# Patient Record
Sex: MALE | Race: WHITE | NOT HISPANIC OR LATINO | URBAN - METROPOLITAN AREA
[De-identification: names, ages, dates, MRNs, and addresses within clinical notes are randomized per-mention and may not be internally consistent; named-entity substitution may affect disease eponyms.]

---

## 2021-06-25 ENCOUNTER — COMMUNICATION - HEALTHEAST (OUTPATIENT)
Dept: ONCOLOGY | Facility: HOSPITAL | Age: 61
End: 2021-06-25

## 2021-06-28 ENCOUNTER — COMMUNICATION - HEALTHEAST (OUTPATIENT)
Dept: ONCOLOGY | Facility: CLINIC | Age: 61
End: 2021-06-28

## 2021-07-06 ENCOUNTER — TRANSCRIBE ORDERS (OUTPATIENT)
Dept: OTHER | Age: 61
End: 2021-07-06

## 2021-07-06 DIAGNOSIS — C25.0 MALIGNANT TUMOR OF HEAD OF PANCREAS (H): Primary | ICD-10-CM

## 2021-07-06 DIAGNOSIS — C25.4 MALIGNANT NEOPLASM OF ENDOCRINE PANCREAS (H): ICD-10-CM

## 2021-07-07 NOTE — TELEPHONE ENCOUNTER
Navigator called patient from IB message that he self referred for radiation oncology:  Notes: Patient called in wanting to make an appointment for his newly diagnosed tumor. Patient said he was just diagnosed today at [facility]. He was wanting to see a radiation oncologist to discuss options.     Left message with call back number, and note that if we do not talk today, I will hand him off to one of my navigator colleagues as I will not be in on Monday.     Unclear why pt wants treatment at [facility] when he lives in [place] and was diagnosed at [facility] in [place]. Will need medical records.     Will try again this afternoon.

## 2021-07-07 NOTE — TELEPHONE ENCOUNTER
Navigator called patient again regarding self referral. No answer. Left message that a nurse navigator will reach out on Monday.

## 2021-07-07 NOTE — TELEPHONE ENCOUNTER
Patient called the clinic last Friday, June 25th, requesting to schedule an appt with a radiation oncologist. VIANNEY Kelly, attempted to reach patient x 2 and messages were left. See prior nrsg notes.  I called Kev this morning and I was unable to reach him. I left a message stating my name, organization and purpose of my call. I encouraged a call back at his earliest convenience. I provided my direct number.

## 2021-07-08 NOTE — TELEPHONE ENCOUNTER
RECORDS STATUS - ALL OTHER DIAGNOSIS      RECORDS RECEIVED FROM:    DATE RECEIVED:    NOTES STATUS DETAILS   OFFICE NOTE from referring provider Self, Notes in [facility] General Surgery/Dr. Agustin: 7/2/21     OFFICE NOTE from medical oncologist [facility] Dr. Luke Hein: 6/25/21   DISCHARGE SUMMARY from hospital NA    DISCHARGE REPORT from the ER NA    OPERATIVE REPORT [facility] 6/22/21: EUS   MEDICATION LIST [facility]   CLINICAL TRIAL TREATMENTS TO DATE     LABS     PATHOLOGY REPORTS [facility], Report in CE, Slides received 7/13 6/22/21: [id number]  6/22/21: [id number]   ANYTHING RELATED TO DIAGNOSIS Epic/CE 5/13/21   GENONOMIC TESTING     TYPE:     IMAGING (NEED IMAGES & REPORT)     CT SCANS PACS 6/26/21, 5/21/21, 5/13/21: PN   MRI PACS PN  5/20/21   MAMMO NA    ULTRASOUND NA    PET PACS 6/30/21: HP

## 2021-07-15 ENCOUNTER — LAB (OUTPATIENT)
Dept: LAB | Facility: CLINIC | Age: 61
End: 2021-07-15
Payer: COMMERCIAL

## 2021-07-15 DIAGNOSIS — C25.0 MALIGNANT NEOPLASM OF HEAD OF PANCREAS (H): Primary | ICD-10-CM

## 2021-07-15 DIAGNOSIS — C25.0: Primary | ICD-10-CM

## 2021-07-15 PROCEDURE — 88321 CONSLTJ&REPRT SLD PREP ELSWR: CPT | Performed by: PATHOLOGY

## 2021-07-16 LAB
PATH REPORT.COMMENTS IMP SPEC: NORMAL
PATH REPORT.FINAL DX SPEC: NORMAL
PATH REPORT.GROSS SPEC: NORMAL
PATH REPORT.RELEVANT HX SPEC: NORMAL
PATH REPORT.RELEVANT HX SPEC: NORMAL
PATH REPORT.SITE OF ORIGIN SPEC: NORMAL

## 2021-07-20 ENCOUNTER — TELEPHONE (OUTPATIENT)
Dept: ONCOLOGY | Facility: CLINIC | Age: 61
End: 2021-07-20

## 2021-07-20 NOTE — PROGRESS NOTES
Left vml w/Pt to call back to advise 7/29 visit with Dr. Barr is video instead of in-person as that is a virtual day for the provider

## 2021-07-21 LAB
PATH REPORT.COMMENTS IMP SPEC: NORMAL
PATH REPORT.COMMENTS IMP SPEC: NORMAL
PATH REPORT.FINAL DX SPEC: NORMAL
PATH REPORT.GROSS SPEC: NORMAL
PATH REPORT.MICROSCOPIC SPEC OTHER STN: NORMAL
PATH REPORT.RELEVANT HX SPEC: NORMAL
PATH REPORT.RELEVANT HX SPEC: NORMAL
PATH REPORT.SITE OF ORIGIN SPEC: NORMAL

## 2021-07-29 ENCOUNTER — VIRTUAL VISIT (OUTPATIENT)
Dept: ONCOLOGY | Facility: CLINIC | Age: 61
End: 2021-07-29
Attending: INTERNAL MEDICINE
Payer: COMMERCIAL

## 2021-07-29 ENCOUNTER — PRE VISIT (OUTPATIENT)
Dept: ONCOLOGY | Facility: CLINIC | Age: 61
End: 2021-07-29

## 2021-07-29 DIAGNOSIS — C25.0 MALIGNANT TUMOR OF HEAD OF PANCREAS (H): ICD-10-CM

## 2021-07-29 PROBLEM — Z85.820 HISTORY OF MELANOMA: Status: ACTIVE | Noted: 2020-06-02

## 2021-07-29 PROBLEM — C44.91 BASAL CELL CARCINOMA OF SKIN: Status: ACTIVE | Noted: 2018-06-05

## 2021-07-29 PROBLEM — D3A.8 NEUROENDOCRINE TUMOR OF PANCREAS (H): Status: ACTIVE | Noted: 2021-06-25

## 2021-07-29 PROCEDURE — 99205 OFFICE O/P NEW HI 60 MIN: CPT | Mod: GT | Performed by: INTERNAL MEDICINE

## 2021-07-29 NOTE — PROGRESS NOTES
Kev is a 61 year old who is being evaluated via a billable video visit.      How would you like to obtain your AVS? [employer]  If the video visit is dropped, the invitation should be resent by: Text to cell phone: [phone]  Will anyone else be joining your video visit? Yes: Mindy, girlfriend will be on the call . How would they like to receive their invitation? Send to e-mail at: [email or web address]    I am seeing Kev Chin today for second opinion on management of the recently diagnosed neuroendocrine carcinoma the pancreas.    He is a healthy 61-year-old gentleman with transient upper abdominal pain that led to imaging and the discovery of a mass in the head of his pancreas.  He had multiple imaging studies and endoscopic ultrasound all of which showed a tumor variably measured between 1.7 and 2.3 cm in the uncinate.   He had 1 benign-appearing local lymph node identified.  There are no significant abnormalities identified in the pancreatic or biliary ducts.  A dotatate PET confirmed the presence of the somatostatin receptor positive tumor in the head of the pancreas without any other sites of disease apparent.  Biopsy showed this to be consistent with a neuroendocrine tumor.  At present the patient tells me he is asymptomatic and I could elicit no symptoms of carcinoid or other hormonal syndrome.    His family history is notable for colon cancer in a sister at a young age but no history of neuroendocrine tumors, endocrinopathies or kidney stones.    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    Via the video link he appears robustly healthy.    I personally reviewed his multiple outside imaging studies confirming the above findings.  Fine-needle aspirate of his tumor done on 15 July confirmed the presence of a neuroendocrine carcinoma with appropriate immunohistochemical staining.  No grade is described for the  tumor.  His most recent lab work shows normal electrolytes, renal function, albumin, bilirubin, liver enzymes, and blood counts.    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  It is a very long talk with the patient about his prognosis and treatment options.  His tumor is of a size where it is on the edge of where we would routinely recommend resection and its location means he would probably require a Whipple rather than a lesser resection.  He is understandably reluctant to consider such a large operation for such a minor tumor.  We discussed at some length whether other local procedures such as enucleation, various forms of ablation, focal radiotherapy, or other more innovative modalities might be appropriate.  I explained that incomplete removal of the tumor probably gains in little or nothing and given its proximity to his ampulla its not clear to me that we could successfully get a complete effect on his tumor without needing to perform a Whipple.  He is seen one pancreatic surgeon who agreed with that assessment, and we will be reviewing his films at pancreatic tumor conference to get some further opinions on that.  Assuming that we do not have a good local ablative option, I discussed that the most appropriate approach probably would be to observe closely with another scan within the next few months and if we are not seeing any growth subsequent imaging at progressively longer intervals.  If there is significant growth in proceeding with a Whipple would be appropriate.  We spent a good deal of time discussing the biology of these tumors, the availability of systemic treatments should his cancer recur after resection, and the generally good prognosis with these tumors.  I would like to have his pathology reviewed and see if we can get a better sense of the greatest if this had features suggesting a higher grade we would probably choose to be little more  aggressive but I expect the amount of tissue we have will not make that assessment feasible.    At the end of our long discussion the patient had all his questions answered.  We will be reviewing his case at pancreas tumor conference I will be following up with him once we have the results of that discussion.

## 2021-07-29 NOTE — LETTER
7/29/2021         RE: Kev Chin  [address]  [place] [facility] [address]        Dear Colleague,    Thank you for referring your patient, Kev Chin, to the [facility]. Please see a copy of my visit note below.    Kev is a 61 year old who is being evaluated via a billable video visit.      How would you like to obtain your AVS? MyChart  If the video visit is dropped, the invitation should be resent by: Text to cell phone: [phone]  Will anyone else be joining your video visit? Yes: Mindy, girlfriend will be on the call . How would they like to receive their invitation? Send to e-mail at: [email or web address]    I am seeing Kev Chin today for second opinion on management of the recently diagnosed neuroendocrine carcinoma the pancreas.    He is a healthy 61-year-old gentleman with transient upper abdominal pain that led to imaging and the discovery of a mass in the head of his pancreas.  He had multiple imaging studies and endoscopic ultrasound all of which showed a tumor variably measured between 1.7 and 2.3 cm in the uncinate.   He had 1 benign-appearing local lymph node identified.  There are no significant abnormalities identified in the pancreatic or biliary ducts.  A dotatate PET confirmed the presence of the somatostatin receptor positive tumor in the head of the pancreas without any other sites of disease apparent.  Biopsy showed this to be consistent with a neuroendocrine tumor.  At present the patient tells me he is asymptomatic and I could elicit no symptoms of carcinoid or other hormonal syndrome.    His family history is notable for colon cancer in a sister at a young age but no history of neuroendocrine tumors, endocrinopathies or kidney stones.    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    Via the video link he appears robustly healthy.    I personally reviewed  his multiple outside imaging studies confirming the above findings.  Fine-needle aspirate of his tumor done on 15 July confirmed the presence of a neuroendocrine carcinoma with appropriate immunohistochemical staining.  No grade is described for the tumor.  His most recent lab work shows normal electrolytes, renal function, albumin, bilirubin, liver enzymes, and blood counts.    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  It is a very long talk with the patient about his prognosis and treatment options.  His tumor is of a size where it is on the edge of where we would routinely recommend resection and its location means he would probably require a Whipple rather than a lesser resection.  He is understandably reluctant to consider such a large operation for such a minor tumor.  We discussed at some length whether other local procedures such as enucleation, various forms of ablation, focal radiotherapy, or other more innovative modalities might be appropriate.  I explained that incomplete removal of the tumor probably gains in little or nothing and given its proximity to his ampulla its not clear to me that we could successfully get a complete effect on his tumor without needing to perform a Whipple.  He is seen one pancreatic surgeon who agreed with that assessment, and we will be reviewing his films at pancreatic tumor conference to get some further opinions on that.  Assuming that we do not have a good local ablative option, I discussed that the most appropriate approach probably would be to observe closely with another scan within the next few months and if we are not seeing any growth subsequent imaging at progressively longer intervals.  If there is significant growth in proceeding with a Whipple would be appropriate.  We spent a good deal of time discussing the biology of these tumors, the availability of systemic treatments should his cancer recur after resection,  and the generally good prognosis with these tumors.  I would like to have his pathology reviewed and see if we can get a better sense of the greatest if this had features suggesting a higher grade we would probably choose to be little more aggressive but I expect the amount of tissue we have will not make that assessment feasible.    At the end of our long discussion the patient had all his questions answered.  We will be reviewing his case at pancreas tumor conference I will be following up with him once we have the results of that discussion.      Again, thank you for allowing me to participate in the care of your patient.        Sincerely,        Steven Barr MD

## 2021-08-22 ENCOUNTER — HEALTH MAINTENANCE LETTER (OUTPATIENT)
Age: 61
End: 2021-08-22

## 2021-10-17 ENCOUNTER — HEALTH MAINTENANCE LETTER (OUTPATIENT)
Age: 61
End: 2021-10-17

## 2021-10-29 ENCOUNTER — TRANSFERRED RECORDS (OUTPATIENT)
Dept: HEALTH INFORMATION MANAGEMENT | Facility: CLINIC | Age: 61
End: 2021-10-29
Payer: COMMERCIAL

## 2021-10-29 ENCOUNTER — PATIENT OUTREACH (OUTPATIENT)
Dept: ONCOLOGY | Facility: CLINIC | Age: 61
End: 2021-10-29

## 2021-10-29 DIAGNOSIS — D3A.8 NEUROENDOCRINE TUMOR OF PANCREAS (H): Primary | ICD-10-CM

## 2021-11-01 ENCOUNTER — PATIENT OUTREACH (OUTPATIENT)
Dept: SURGERY | Facility: CLINIC | Age: 61
End: 2021-11-01

## 2021-11-01 NOTE — PROGRESS NOTES
New Patient Oncology Nurse Navigator Note     Referring provider: Dr. Barr     Referring Clinic/Organization: [facility]     Referred to: Surgical Oncology -  Hepatobiliary / GI Cancers     Requested provider (if applicable): First available provider    Referral Received: 11/01/21       Evaluation for : NEUROENDOCRINE TUMOR OF PANCREAS      Clinical History (per Nurse review of records provided):      SEE BOOKMARKS FOR DOCUMENTS BELOW:       EUS 6/22/2021     DOTATATE 6/30/2021    Oncology with Dr. Hein and also saw Dr. Barr.     Surgical consult completed with Vamsi on 7/2/2021     PATHOLOGY   Pancreas, head, fine-needle aspiration:  Positive for neoplasm, consistent with pancreatic neuroendocrine tumor, see comment.      No Known Allergies       Clinical Assessment / Barriers to Care (Per Nurse):    None at this time.     Records Location:     [facility] Everywhere     Records Needed:     ABDOMINAL IMAGING (CT SCANS, MRI, US)  DATING BACK TO 2020      Additional testing needed prior to consult:     CT CAP     Referral updates and Plan:       Consult with Surgical Oncology   CT CAP - pancreas protocol - Scheduled at [facility] on 12/16/2021 11/01/2021 1:28 PM - scheduling instructions entered for patient to be scheduled after CT scan that is scheduled for 12/16/2021 and sent to scheduling.     Rylie Worthington, RN, BSN   Surgical Oncology New Patient Nurse Navigator  [facility]  [phone]

## 2021-12-13 ENCOUNTER — TELEPHONE (OUTPATIENT)
Dept: SURGERY | Facility: CLINIC | Age: 61
End: 2021-12-13
Payer: COMMERCIAL

## 2021-12-13 NOTE — TELEPHONE ENCOUNTER
I called Kev to confirm a time change for his video visit with Dr Foy on 12/20/21 from 1030am to 130pm as requested by ROGE via IB. Kev did not answer, so a voicemail was left with the above information and a request for a return call to confirm. IB sent to ROGE with status.

## 2021-12-20 ENCOUNTER — VIRTUAL VISIT (OUTPATIENT)
Dept: SURGERY | Facility: CLINIC | Age: 61
End: 2021-12-20
Attending: INTERNAL MEDICINE
Payer: COMMERCIAL

## 2021-12-20 DIAGNOSIS — D3A.8 NEUROENDOCRINE TUMOR OF PANCREAS (H): ICD-10-CM

## 2021-12-20 PROCEDURE — 999N001193 HC VIDEO/TELEPHONE VISIT; NO CHARGE

## 2021-12-20 PROCEDURE — 99205 OFFICE O/P NEW HI 60 MIN: CPT | Mod: GT | Performed by: SURGERY

## 2021-12-20 RX ORDER — ASPIRIN 325 MG
325 TABLET ORAL PRN
COMMUNITY
End: 2022-08-03

## 2021-12-20 RX ORDER — AMLODIPINE BESYLATE 5 MG/1
1 TABLET ORAL DAILY
COMMUNITY
Start: 2021-06-19

## 2021-12-20 RX ORDER — LOSARTAN POTASSIUM 50 MG/1
1 TABLET ORAL DAILY
COMMUNITY
Start: 2021-06-19 | End: 2022-12-19

## 2021-12-20 NOTE — PROGRESS NOTES
Kev is a 61 year old who is being evaluated via a billable video visit.      Patient reports he has outside electronic records available and he has given verbal consent to pull them through [facility] via care everywhere.     How would you like to obtain your AVS? Jarredharmarc  If the video visit is dropped, the invitation should be resent by: Send to e-mail at: [email or web address]  Will anyone else be joining your video visit? Lena    Kallie Werner VF   Video Start Time: 1:36 PM  Video-Visit Details    Type of service:  Video Visit    Video End Time:2:14 PM    Originating Location (pt. Location): Home    Distant Location (provider location):  [facility]     Platform used for Video Visit: OfficeDrop     VIDEO VISIT    REASON FOR EVALUATION:  Newly diagnosed pancreatic neuroendocrine tumor.    HISTORY OF PRESENT ILLNESS:  Mr. Chin is a 61-year-old gentleman who is very physically active and considers himself to be quite healthy overall who was diagnosed with a pancreatic neuroendocrine tumor in the head of his pancreas earlier this summer. He has had multiple imaging studies including CTs and MRIs as well as endoscopic ultrasound.  His endoscopic ultrasound measured his neuroendocrine tumor at about 2.3 cm.  His most recent CT scan measures it at 2.9 cm and there has been some variability in size measurements over the course of the last 6 months, but practically speaking, I think the differences are more related to the technique as opposed to growth of the tumor.  The patient has been seen by Medical Oncology as well as Surgery over at [facility].  He did meet Dr. Pool Agustin from Surgery and also Dr. Luke Hein from Medical Oncology.  He presented to the [facility] and met with Dr. Angel Barr for a second opinion, and similarly, he is meeting with me for a second opinion as well.  He was told this week by Dr. Hein that his Ki-67 index was 1%, and although I do not have the  records of that in front of me, I did indicate to him that would imply that he does have a low-grade, well-differentiated neuroendocrine tumor.  He does not have any clinical symptoms, and therefore, this would be classified as a nonfunctional tumor.  His tumor is in the head of the pancreas immediately adjacent to the ampulla and main pancreatic duct.    Mr. Chin also notes to me that he did have a recent DOTATATE scan over at [facility] that I do not have access to.  However, it did light up what appears to be a possible meningioma in the brain.  He is awaiting further evaluation of that and a meeting with neurosurgery.    I discussed with Mr. Chin that, based on size greater than 2 cm, even for low-grade neuroendocrine tumors, generally speaking, surgical resection is recommended due to the risk of progression into lymph nodes or metastatic disease.  Having said that, certainly people can have fairly long life expectancy even without surgical treatment, particularly under close surveillance.  I discussed that because of the size of the tumor, I would recommend a Whipple procedure.  I discussed details of that procedure including risks such as bleeding, infection, anastomotic leak, delayed gastric emptying, diabetes, pancreatic insufficiency, and a risk of mortality that I told him it would be very close to zero.  We focused most of our discussion today on changes to quality of life after Whipple surgery, and in that regard, I discussed mainly things like diabetes, changes in diet habits, changes in bowel habits that could occur including occasional diarrhea, etc.  However, based on his overall clinical wellness, I discussed that I would expect him to make a full recovery and have an excellent quality of life following surgery.  He asked specifically about scuba diving and high altitude skiing, etc., which I offered would be no problem after a Whipple procedure with 1 caveat that, certainly if he became  diabetic, he would have to manage his blood sugars, particularly if he is in remote areas or scuba diving.    I think Mr. Chin has decided against surgical resection, but he was happy to talk over the possibilities.  He has met with Dr. Hein and will plan to do surveillance of this lesion with Dr. Hein.  I offered to see him again to follow along for surgical decision making and he declined that offer, stating he was quite happy to follow with Dr. Hein, and only to meet again if this lesion grows or changes in a way that he would want to consider surgical resection.  I offered that sounds fine to me, and certainly, we want to support any care decision he makes for himself.  He is clearly aware that this tumor can progress or metastasize during surveillance and we did discuss that, but given the low-grade nature of it, I think that is very unlikely, and frankly, I think surveillance is a reasonable first approach.  Certainly, if this shows evidence of growth, we can reconsider surgery again.  As I said, we will not make a followup with me based on patient's preference, but I would be happy to see him again in the future if the need arises.    A total of 45 minutes was spent on virtual video visit with Mr. Chin today.  An additional 15 minutes was spent in review of his interval history, multiple imaging studies and coordination of his care.

## 2021-12-20 NOTE — LETTER
12/20/2021         RE: Kev Chin  [address]  [facility] [address]        Dear Colleague,    Thank you for referring your patient, Kev Chin, to the [facility]. Please see a copy of my visit note below.    Kev is a 61 year old who is being evaluated via a billable video visit.      Patient reports he has outside electronic records available and he has given verbal consent to pull them through [facility] via care everywhere.     How would you like to obtain your AVS? MyChart  If the video visit is dropped, the invitation should be resent by: Send to e-mail at: [email or web address]  Will anyone else be joining your video visit? Lena Werner VF   Video Start Time: 1:36 PM  Video-Visit Details    Type of service:  Video Visit    Video End Time:2:14 PM    Originating Location (pt. Location): Home    Distant Location (provider location):  [facility]     Platform used for Video Visit: [employer]     VIDEO VISIT    REASON FOR EVALUATION:  Newly diagnosed pancreatic neuroendocrine tumor.    HISTORY OF PRESENT ILLNESS:  Mr. Chin is a 61-year-old gentleman who is very physically active and considers himself to be quite healthy overall who was diagnosed with a pancreatic neuroendocrine tumor in the head of his pancreas earlier this summer. He has had multiple imaging studies including CTs and MRIs as well as endoscopic ultrasound.  His endoscopic ultrasound measured his neuroendocrine tumor at about 2.3 cm.  His most recent CT scan measures it at 2.9 cm and there has been some variability in size measurements over the course of the last 6 months, but practically speaking, I think the differences are more related to the technique as opposed to growth of the tumor.  The patient has been seen by Medical Oncology as well as Surgery over at [facility].  He did meet Dr. Pool Agustin from Surgery and also Dr. Luke Hein from Medical Oncology.  He  presented to the [facility] and met with Dr. Angel Barr for a second opinion, and similarly, he is meeting with me for a second opinion as well.  He was told this week by Dr. Hein that his Ki-67 index was 1%, and although I do not have the records of that in front of me, I did indicate to him that would imply that he does have a low-grade, well-differentiated neuroendocrine tumor.  He does not have any clinical symptoms, and therefore, this would be classified as a nonfunctional tumor.  His tumor is in the head of the pancreas immediately adjacent to the ampulla and main pancreatic duct.    Mr. Chin also notes to me that he did have a recent DOTATATE scan over at [facility] that I do not have access to.  However, it did light up what appears to be a possible meningioma in the brain.  He is awaiting further evaluation of that and a meeting with neurosurgery.    I discussed with Mr. Chin that, based on size greater than 2 cm, even for low-grade neuroendocrine tumors, generally speaking, surgical resection is recommended due to the risk of progression into lymph nodes or metastatic disease.  Having said that, certainly people can have fairly long life expectancy even without surgical treatment, particularly under close surveillance.  I discussed that because of the size of the tumor, I would recommend a Whipple procedure.  I discussed details of that procedure including risks such as bleeding, infection, anastomotic leak, delayed gastric emptying, diabetes, pancreatic insufficiency, and a risk of mortality that I told him it would be very close to zero.  We focused most of our discussion today on changes to quality of life after Whipple surgery, and in that regard, I discussed mainly things like diabetes, changes in diet habits, changes in bowel habits that could occur including occasional diarrhea, etc.  However, based on his overall clinical wellness, I discussed that I would expect him to make a full  recovery and have an excellent quality of life following surgery.  He asked specifically about scuba diving and high altitude skiing, etc., which I offered would be no problem after a Whipple procedure with 1 caveat that, certainly if he became diabetic, he would have to manage his blood sugars, particularly if he is in remote areas or scuba diving.    I think Mr. Chin has decided against surgical resection, but he was happy to talk over the possibilities.  He has met with Dr. Hein and will plan to do surveillance of this lesion with Dr. Hein.  I offered to see him again to follow along for surgical decision making and he declined that offer, stating he was quite happy to follow with Dr. Hein, and only to meet again if this lesion grows or changes in a way that he would want to consider surgical resection.  I offered that sounds fine to me, and certainly, we want to support any care decision he makes for himself.  He is clearly aware that this tumor can progress or metastasize during surveillance and we did discuss that, but given the low-grade nature of it, I think that is very unlikely, and frankly, I think surveillance is a reasonable first approach.  Certainly, if this shows evidence of growth, we can reconsider surgery again.  As I said, we will not make a followup with me based on patient's preference, but I would be happy to see him again in the future if the need arises.    A total of 45 minutes was spent on virtual video visit with Mr. Chin today.  An additional 15 minutes was spent in review of his interval history, multiple imaging studies and coordination of his care.          Again, thank you for allowing me to participate in the care of your patient.        Sincerely,        Kev Foy MD

## 2021-12-22 ENCOUNTER — PRE VISIT (OUTPATIENT)
Dept: NEUROSURGERY | Facility: CLINIC | Age: 61
End: 2021-12-22

## 2021-12-22 ENCOUNTER — VIRTUAL VISIT (OUTPATIENT)
Dept: NEUROSURGERY | Facility: CLINIC | Age: 61
End: 2021-12-22
Payer: COMMERCIAL

## 2021-12-22 DIAGNOSIS — D32.9 MENINGIOMA (H): Primary | ICD-10-CM

## 2021-12-22 PROCEDURE — 99204 OFFICE O/P NEW MOD 45 MIN: CPT | Mod: GT | Performed by: NEUROLOGICAL SURGERY

## 2021-12-22 NOTE — LETTER
2021       RE: Kev Chin  [address]  [facility] [address]     Dear Colleague,    Thank you for referring your patient, Kev Chin, to the [facility] NEUROSURGERY CLINIC [place] [facility]. Please see a copy of my visit note below.      [facility]      Kev Chin   61 year old male who is being evaluated via a video visit   : 1960  Referring provider: Luke Hein MD  2021      Reason for visit: cerebellomedullary angle tumor, new patient visit    Dear Dr. Hein,     It was a pleasure to see Mr. Chin  in the [facility] today as a new patient.  As you recall, Mr. 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 He did undergo further workup including pancreatic US and the tumor was measured to be 2.3 cm. He has followed with Dr. Pool Agustin of pancreatic surgery, but he deferred interventions at this time.  He underwent DOTATAT scan on  showed no metastatic disease at that time, however he presented on 21 for abdominal follow up and the physician informed him of mass found on his brain.     He has been experiencing difficulties with complex balance at yoga and slight fine-motor skill decompensation with tennis. He reports no changes in voice quality, dysphagia, shoulder pain/weakness, issues with mastication, hearing loss, or gross balance issuess.     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 He is from [place] originally. He has never smoked. He has 6 children: 4 biological daughters  and 2 adopted children.      Review of Systems:   Pertinent items are noted in HPI, remainder of complete ROS is negative.      Physical Exam by Video:   He is fluent with speech and very pleasant. Extraocular movements are intact. Face is symmetric. Appears to be moving all extremities symmetrically.     Imaging:  We reviewed his recent MRI and this demonstrates right cerebromedullary angle enhancing tumor measuring 2.5cm contacting the lower cranial nerves and cranial nerve 7-8 complex. There is no significant mass effect on the brainstem and no hydrocephalus.    Assessment:  1. Pancreatic neuroendocrine tumor  2. 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 We reviewed the MRI findings and possible diagnoses including meningioma, metastasis, among other possibilities. We reviewed options of observation, radiation, and surgical resection of the cerebellomedullary mass.  2. Given it is asymptomatic, we will repeat an MRI in 3 months at [facility] to evaluate the cranial nerve relationship to the tumor.  3. We reviewed the surgery risks including CSF leak, incomplete removal, swallowing dysfunction, voice quality change, shoulder weakness, tongue movement abnormalities, stroke, among others.   4. I encouraged him to contact us should any questions or concerns arise in advance of his next appointment      It has been a pleasure to participate in the care of your patient. Please feel free to contact us if we may be of any assistance for Mr. Chin.    Visit:  Video started at 11:16am  Video ended at 11:51am    Quinten Mccray MD   Department of Neurosurgery  [facility]  [facility]         Scribe Disclosure:  I, Dhiraj Sandoval, am serving as a scribe to document services personally performed by Quinten Mccray MD at this visit, based upon the provider's statements to me. All documentation has been reviewed by the aforementioned provider prior to being  entered into the official medical record.   Preparation Of Recipient Site - Flap Takedown: The eschar and granulation tissue was removed surgically with sharp dissection to facilitate appropriate healing after division and inset of the proximal and distal interpolation flap.

## 2021-12-22 NOTE — PROGRESS NOTES
Kev is a 61 year old who is being evaluated via a billable video visit.      How would you like to obtain your AVS? MyChart  If the video visit is dropped, the invitation should be resent by: Send to e-mail at: [email or web address]  Will anyone else be joining your video visit? No        Video-Visit Details    Type of service:  Video Visit        Originating Location (pt. Location): Home    Distant Location (provider location):  [facility] NEUROSURGERY [facility]     Platform used for Video Visit: [employer]

## 2021-12-22 NOTE — PROGRESS NOTES
[facility]      Kev Chin   61 year old male who is being evaluated via a video visit   : 1960  Referring provider: Luke Hein MD  2021      Reason for visit: cerebellomedullary angle tumor, new patient visit    Dear Dr. Hein,     It was a pleasure to see Mr. Chin  in the [facility] today as a new patient.  As you recall, Mr. 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 He did undergo further workup including pancreatic US and the tumor was measured to be 2.3 cm. He has followed with Dr. Pool Agustin of pancreatic surgery, but he deferred interventions at this time.  He underwent DOTATAT scan on  showed no metastatic disease at that time, however he presented on 21 for abdominal follow up and the physician informed him of mass found on his brain.     He has been experiencing difficulties with complex balance at yoga and slight fine-motor skill decompensation with tennis. He reports no changes in voice quality, dysphagia, shoulder pain/weakness, issues with mastication, hearing loss, or gross balance issuess.     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 He has never smoked. He has 6 children: 4 biological daughters and 2 adopted children.      Review of Systems:   Pertinent items are noted in HPI, remainder of complete ROS is negative.      Physical Exam by Video:   He is fluent with speech and very pleasant. Extraocular movements are intact. Face is symmetric. Appears to be moving all extremities symmetrically.      Imaging:  We reviewed his recent MRI and this demonstrates right cerebromedullary angle enhancing tumor measuring 2.5cm contacting the lower cranial nerves and cranial nerve 7-8 complex. There is no significant mass effect on the brainstem and no hydrocephalus.    Assessment:  1. Pancreatic neuroendocrine tumor  2. 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 We reviewed the MRI findings and possible diagnoses including meningioma, metastasis, among other possibilities. We reviewed options of observation, radiation, and surgical resection of the cerebellomedullary mass.  2. Given it is asymptomatic, we will repeat an MRI in 3 months at [facility] to evaluate the cranial nerve relationship to the tumor.  3. We reviewed the surgery risks including CSF leak, incomplete removal, swallowing dysfunction, voice quality change, shoulder weakness, tongue movement abnormalities, stroke, among others.   4. I encouraged him to contact us should any questions or concerns arise in advance of his next appointment      It has been a pleasure to participate in the care of your patient. Please feel free to contact us if we may be of any assistance for Mr. Chin.    Visit:  Video started at 11:16am  Video ended at 11:51am    Quinten Mccray MD   Department of Neurosurgery  [facility]  [facility]         Scribe Disclosure:  I, Dhiraj Sandoval, am serving as a scribe to document services personally performed by Quinten Mccray MD at this visit, based upon the provider's statements to me. All documentation has been reviewed by the aforementioned provider prior to being entered into the official medical record.    Patient statement: cannot visit in person due to Covid19    Physician has received verbal consent for a Video Visit from the patient? Yes    Patient would like the video invitation sent by: [employer] Well    Originating Location (pt. Location): Home    Distant  "Location (provider location):  [facility] NEUROSURGERY CLINIC [place]     Mode of Communication:  Video Conference via [employer]      Kev Chin is a 61 year old male who is being evaluated via a billable video visit.      The patient has been notified of following:     \"This video visit will be conducted via a call between you and your physician/provider. We have found that certain health care needs can be provided without the need for an in-person physical exam.  This service lets us provide the care you need with a video conversation.  If a prescription is necessary we can send it directly to your pharmacy.  If lab work is needed we can place an order for that and you can then stop by our lab to have the test done at a later time.    If during the course of the call the physician/provider feels a video visit is not appropriate, you will not be charged for this service.\"       "

## 2021-12-22 NOTE — LETTER
[place] [facility]  [facility] NEUROSURGERY CLINIC [place] [id number] [facility]  3RD FLOOR  [facility] [address]  Phone: [phone]  Fax: [phone]          2021    RE:   Kev Chin  [address]  [facility] [address]      Dear Colleague,    Thank you for referring your patient, Kev Chin, to the [facility]. Please see a copy of my visit note below.        [facility]      Kev Chin   61 year old male who is being evaluated via a video visit   : 1960  Referring provider: Luke Hein MD  2021      Reason for visit: cerebellomedullary angle tumor, new patient visit    Dear Dr. Hein,     It was a pleasure to see Mr. Chin  in the [facility] today as a new patient.  As you recall, Mr. 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 He did undergo further workup including pancreatic US and the tumor was measured to be 2.3 cm. He has followed with Dr. Pool Agustin of pancreatic surgery, but he deferred interventions at this time.  He underwent DOTATAT scan on  showed no metastatic disease at that time, however he presented on 21 for abdominal follow up and the physician informed him of mass found on his brain.     He has been experiencing difficulties with complex balance at yoga and slight fine-motor skill decompensation with tennis. He reports no changes in voice quality, dysphagia, shoulder pain/weakness, issues with mastication, hearing loss, or gross balance issuess.     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 He has never smoked. He has 6 children: 4 biological daughters and 2 adopted children.      Review of Systems:   Pertinent items are noted in HPI, remainder of complete ROS is negative.      Physical Exam by Video:   He is fluent with speech and very pleasant. Extraocular movements are intact. Face is symmetric. Appears to be moving all extremities symmetrically.     Imaging:  We reviewed his recent MRI and this demonstrates right cerebromedullary angle enhancing tumor measuring 2.5cm contacting the lower cranial nerves and cranial nerve 7-8 complex. There is no significant mass effect on the brainstem and no hydrocephalus.    Assessment:  1. Pancreatic neuroendocrine tumor  2. 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 We reviewed the MRI findings and possible diagnoses including meningioma, metastasis, among other possibilities. We reviewed options of observation, radiation, and surgical resection of the cerebellomedullary mass.  2. Given it is asymptomatic, we will repeat an MRI in 3 months at [facility] to evaluate the cranial nerve relationship to the tumor.  3. We reviewed the surgery risks including CSF leak, incomplete removal, swallowing dysfunction, voice quality change, shoulder weakness, tongue movement abnormalities, stroke, among others.   4. I encouraged him to contact us should any questions or concerns arise in advance of his next appointment      It has been a pleasure to participate in the care of your patient. Please feel free to contact us if we may be of any assistance for Mr. Chin.    Visit:  Video started at 11:16am  Video ended at 11:51am    Quinten Mccray MD   Department of Neurosurgery  [facility]  [facility]         Scribe Disclosure:  I, Dhiraj Sandoval, am serving as a scribe to document services personally performed by Quinten Mccray,  "MD at this visit, based upon the provider's statements to me. All documentation has been reviewed by the aforementioned provider prior to being entered into the official medical record.    Patient statement: cannot visit in person due to Covid19    Physician has received verbal consent for a Video Visit from the patient? Yes    Patient would like the video invitation sent by: [employer] Well    Originating Location (pt. Location): Home    Distant Location (provider location):  [facility] NEUROSURGERY [facility]     Mode of Communication:  Video Conference via [employer]      Kev Chin is a 61 year old male who is being evaluated via a billable video visit.      The patient has been notified of following:     \"This video visit will be conducted via a call between you and your physician/provider. We have found that certain health care needs can be provided without the need for an in-person physical exam.  This service lets us provide the care you need with a video conversation.  If a prescription is necessary we can send it directly to your pharmacy.  If lab work is needed we can place an order for that and you can then stop by our lab to have the test done at a later time.    If during the course of the call the physician/provider feels a video visit is not appropriate, you will not be charged for this service.\"       Quinten Mccray MD  "

## 2021-12-22 NOTE — TELEPHONE ENCOUNTER
RECORDS RECEIVED FROM: External   REASON FOR VISIT: meningioma   Date of Appt: 12/22/21   NOTES (FOR ALL VISITS) STATUS DETAILS   OFFICE NOTE from referring provider Care Everywhere Dr Hein @ [facility]:  12/17/21 encounter   OFFICE NOTE from other specialist N/A    DISCHARGE SUMMARY from hospital N/A    DISCHARGE REPORT from the ER N/A    OPERATIVE REPORT N/A    MEDICATION LIST Care Everywhere    IMAGING  (FOR ALL VISITS)     EMG N/A    EEG N/A    LUMBAR PUNCTURE N/A    JOSIE SCAN N/A    ULTRASOUND (CAROTID BILAT) *VASCULAR* N/A    MRI (HEAD, NECK, SPINE) Received [facility]:  MRI Brain 12/17/21   CT (HEAD, NECK, SPINE) N/A

## 2021-12-22 NOTE — Clinical Note
December 22, 2021       TO: Kev Chin  [address]  [facility] [address]       DearMr.Elsy,    We are writing to inform you of your test results.    {[facility] results letter list:[id number]}    No results found from the In Basket message.    ***

## 2021-12-24 ENCOUNTER — PATIENT OUTREACH (OUTPATIENT)
Dept: CARE COORDINATION | Facility: CLINIC | Age: 61
End: 2021-12-24

## 2021-12-24 NOTE — PROGRESS NOTES
Oncology Distress Screening Follow-up  Clinical Social Work  [facility]    Identified Concern and Score From Distress Screenin. How concerned are you about your ability to eat?  0       2. How concerned are you about unintended weight loss or your current weight?  3       3. How concerned are you about feeling depressed or very sad?  10 Abnormal        4. How concerned are you about feeling anxious or very scared?  10 Abnormal        5. Do you struggle with the loss of meaning and david in your life?  Not at all       6. How concerned are you about work and home life issues that may be affected by your cancer?  10 Abnormal        7. How concerned are you about knowing what resources are available to help you?  8 Abnormal        8. Do you currently have what you would describe as Zoroastrianism or spiritual struggles?             Not at all               Date of Distress Screenin21      Data: Kev seen in virtual visit for neurosurgery follow up      Intervention/Education Provided:: SW called and left message, introducing self and reason for call. SW provided contact information for self and primary SW at the [facility] [place] [place]. Encouraged Kev to call back when he is able to discuss support and resource needs.       Follow-up Required: SW will await Kev's call back.         CAROL Sellers, [facility]  Clinical , Adult Oncology  Phone: [phone]

## 2022-01-19 DIAGNOSIS — D32.9 MENINGIOMA (H): Primary | ICD-10-CM

## 2022-03-15 NOTE — PROGRESS NOTES
[facility]      Name: Kev Chin  : 1960  Referring provider: Luke Hein MD  2022      Reason for visit: cerebellomedullary angle tumor, follow up visit    Dear Dr. Hein,     It was a pleasure to see Mr. Chin back in the [facility] today in follow up.  As you recall, Mr. 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 The patient followed with Dr. Pool Agustin of pancreatic surgery, who opted with surveillance at that time. He underwent DOTATATE imaging (2021) that displayed no metastatic disease, however, a cerebellomedullary angle was found on (2021). At our consultation visit 6 weeks ago (2021), he reported difficulties with complex balance at yoga and slight fine-motor skill decompensation with tennis. Kev denied any dysphonia, dysphagia, shoulder pain, mastication issues, hearing loss, or gross balance issues. Today, the patient reports that he is doing well. He denies any issues with swallowing or balance. He endorses history of right eye drooping due to right catract surgery.     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 He has never smoked. He has 6 children: 4 biological daughters and 2 adopted children. His daughter works for [employer]. He enjoys skiing and bikes twice per week.    Review of Systems:   Pertinent items are noted in HPI, remainder of complete ROS is negative.       Physical Exam:   BP (!) 156/111   Pulse 79   Wt 109.3 kg (241 lb)   SpO2 96%      General: No acute distress.   Head: No signs of trauma.    Eyes: Conjunctivae are normal.  Mouth/Throat: Oropharynx moist.  Neck: Normal range of motion.    Resp: No respiratory distress.   MSK: Moves all extremities.  No obvious deformity.  Neuro: The patient is fully oriented and quite pleasant. Speech normal. Extraocular movements are intact without nystagmus. Facial sensation is intact in V1, V2, V3 distributions. Facial nerve function is normal, rated as a House Brackmann II/VI on the right, without synkinesis. Palate is symmetric. Shoulders are full strength. Tongue is midline. There is no pronator drift. Full strength in all extremities.   Psych: Normal mood and affect. Behavior is normal.      Imaging:  We reviewed has recent MRI and compared it to previous MRI. This demonstrates a slight increase in size of meningioma, about 1 mm.    MR Brain W/O & W Contrast (03/16/2022):  IMPRESSION:  Slight increase size of right pontocerebellar angle meningioma since 12/17/2021.     Assessment:  1. Pancreatic neuroendocrine tumor  2. 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 We reviewed his updated MRI today and thoroughly discussed the small increase in size of his probably meningoma. We discussed options of surveillance with updated imaging in 3-6 months,  microsurgical resection, or radiosurgery.  Early interval followup would be reasonable given the slight or even equivocal increase in size.  2. Should he wish to proceed with surgery either now or in the future, we discussed risks  including bleeding, infection, CSF leak, neurologic injury, stroke, facial paralysis, hearing loss, swallowing dysfunction, incomplete removal.  3. He would like to follow up at the end of summer, around August 2022 (~5 months since previous scan)  4. I encouraged him to contact us should any questions or concerns arise in  advance of his next appointment      It has been a pleasure to participate in the care of your patient. Please feel free to contact us if we may be of any assistance for Mr. Chin.      Quinten Mccray MD   Department of Neurosurgery  [facility]  [facility]         Scribe Disclosure:  I, Heena Newberry, am serving as a scribe to document services personally performed by Quinten Mccray MD at this visit, based upon the provider's statements to me. All documentation has been reviewed by the aforementioned provider prior to being entered into the official medical record.

## 2022-03-16 ENCOUNTER — ANCILLARY PROCEDURE (OUTPATIENT)
Dept: MRI IMAGING | Facility: CLINIC | Age: 62
End: 2022-03-16
Attending: NEUROLOGICAL SURGERY
Payer: COMMERCIAL

## 2022-03-16 ENCOUNTER — OFFICE VISIT (OUTPATIENT)
Dept: NEUROSURGERY | Facility: CLINIC | Age: 62
End: 2022-03-16
Payer: COMMERCIAL

## 2022-03-16 VITALS
OXYGEN SATURATION: 96 % | WEIGHT: 241 LBS | SYSTOLIC BLOOD PRESSURE: 156 MMHG | DIASTOLIC BLOOD PRESSURE: 111 MMHG | HEART RATE: 79 BPM

## 2022-03-16 DIAGNOSIS — D32.9 MENINGIOMA (H): ICD-10-CM

## 2022-03-16 DIAGNOSIS — D32.9 MENINGIOMA (H): Primary | ICD-10-CM

## 2022-03-16 PROCEDURE — 99214 OFFICE O/P EST MOD 30 MIN: CPT | Performed by: NEUROLOGICAL SURGERY

## 2022-03-16 RX ORDER — GADOBUTROL 604.72 MG/ML
0.1 INJECTION INTRAVENOUS ONCE
Status: COMPLETED | OUTPATIENT
Start: 2022-03-16 | End: 2022-03-16

## 2022-03-16 RX ADMIN — GADOBUTROL 10 ML: 604.72 INJECTION INTRAVENOUS at 09:38

## 2022-03-16 NOTE — PATIENT INSTRUCTIONS
Thank you for choosing [facility]. You were seen in the Neurosurgery Clinic today with Dr. Mccray.    Next steps:  1. Return to neurosurgery clinic in August with Dr. Mccray.  2. Complete MRI at [facility] prior to the appointment    Please don't hesitate to reach out via MyChart or telephone if you have any questions after your visit.    Erica Charles RN Care Coordinator, Neurosurgery    Direct: [phone]  Neurosurgery Clinic: [phone]

## 2022-03-16 NOTE — Clinical Note
3/16/2022       RE: Kev Chin  [address]  [facility] [address]     Dear Colleague,    Thank you for referring your patient, Kev Chin, to the [facility] [place] [facility]. Please see a copy of my visit note below.      [facility]      Name: Kev Chin  : 1960  Referring provider: Luke Hein MD  2022      Reason for visit: cerebellomedullary angle tumor, follow up visit    Dear Dr. Hein,     It was a pleasure to see Mr. Chin back in the [facility] today in follow up.  As you recall, Mr. 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 The patient followed with Dr. Pool Agustin of pancreatic surgery, who opted with surveillance at that time. He underwent DOTATATE imaging (2021) that displayed no metastatic disease, however, a cerebellomedullary angle was found on (2021). At our consultation visit 6 weeks ago (2021), he reported difficulties with complex balance at yoga and slight fine-motor skill decompensation with tennis. Kev denied any dysphonia, dysphagia, shoulder pain, mastication issues, hearing loss, or gross balance issues. Today, the patient reports that he is doing well. He denies any issues with swallowing or balance. He endorses history of right eye drooping due to right catract surgery.     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 He is from [place] originally. He has never smoked. He has 6 children: 4 biological daughters and 2 adopted children. His daughter works for [employer]. He enjoys skiing and bikes twice per week.    Review of Systems:   Pertinent items are noted in HPI, remainder of complete ROS is negative.      Physical Exam:   BP (!) 156/111   Pulse 79   Wt 109.3 kg (241 lb)   SpO2 96%      General: No acute distress.   Head: No signs of trauma.    Eyes: Conjunctivae are normal.  Mouth/Throat: Oropharynx moist.  Neck: Normal range of motion.    Resp: No respiratory distress.   MSK: Moves all extremities.  No obvious deformity.  Neuro: The patient is fully oriented and quite pleasant. Speech normal. Extraocular movements are intact without nystagmus. Facial sensation is intact in V1, V2, V3 distributions. Facial nerve function is normal, rated as a House Brackmann II/VI on the right, without synkinesis. Palate is symmetric. Shoulders are full strength. Tongue is midline. There is no pronator drift. Full strength in all extremities.   Psych: Normal mood and affect. Behavior is normal.      Imaging:  We reviewed has recent MRI and compared it to previous MRI. This demonstrates a slight increase in size of meningioma, about 1 mm.    MR Brain W/O & W Contrast (03/16/2022):  IMPRESSION:  Slight increase size of right pontocerebellar angle meningioma since 12/17/2021.     Assessment:  1. Pancreatic neuroendocrine tumor  2. 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 We reviewed his updated MRI today and thoroughly discussed the small increase in size of his probably meningoma. We discussed options of surveillance with updated imaging in 3-6 months,  microsurgical resection, or radiosurgery.  Early interval followup would be reasonable given the slight or even equivocal increase in size.  2. Should he wish to proceed with surgery either now or in the future, we discussed risks  including bleeding,  infection, CSF leak, neurologic injury, stroke, facial paralysis, hearing loss, swallowing dysfunction, incomplete removal.  3. He would like to follow up at the end of summer, around August 2022 (~5 months since previous scan)  4. I encouraged him to contact us should any questions or concerns arise in advance of his next appointment      It has been a pleasure to participate in the care of your patient. Please feel free to contact us if we may be of any assistance for Mr. Chin.      Quinten Mccray MD   Department of Neurosurgery  [facility]  [facility]         Scribe Disclosure:  I, Heena Newberry, am serving as a scribe to document services personally performed by Quinten Mccray MD at this visit, based upon the provider's statements to me. All documentation has been reviewed by the aforementioned provider prior to being entered into the official medical record.                Again, thank you for allowing me to participate in the care of your patient.      Sincerely,    Quinten Mccray MD

## 2022-03-16 NOTE — LETTER
[place] [facility]  [facility] NEUROSURGERY CLINIC [address]  [address]RD FLO[address]  [facility] [address]  Phone: [phone]  Fax: [phone]          3/16/2022    RE:   Kev Chin  [address]  [facility] [address]      Dear Colleague,    Thank you for referring your patient, Kev Chin, to the [facility]. Please see a copy of my visit note below.        [facility]      Name: Kev Chin  : 1960  Referring provider: Luke Heni MD  2022      Reason for visit: cerebellomedullary angle tumor, follow up visit    Dear Dr. Hein,     It was a pleasure to see Mr. Chin back in the [facility] today in follow up.  As you recall, Mr. 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 The patient followed with Dr. Pool Agustin of pancreatic surgery, who opted with surveillance at that time. He underwent DOTATATE imaging (2021) that displayed no metastatic disease, however, a cerebellomedullary angle was found on (2021). At our consultation visit 6 weeks ago (2021), he reported difficulties with complex balance at yoga and slight fine-motor skill decompensation with tennis. Kev denied any dysphonia, dysphagia, shoulder pain, mastication issues, hearing loss, or gross balance issues. Today, the patient reports that he is doing well. He denies any issues with swallowing or balance. He endorses history of right eye drooping due to right catract surgery.     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 He has never smoked. He has 6 children: 4 biological daughters and 2 adopted children. His daughter works for [employer]. He enjoys skiing and bikes twice per week.    Review of Systems:   Pertinent items are noted in HPI, remainder of complete ROS is negative.      Physical Exam:   BP (!) 156/111   Pulse 79   Wt 109.3 kg (241 lb)   SpO2 96%      General: No acute distress.   Head: No signs of trauma.    Eyes: Conjunctivae are normal.  Mouth/Throat: Oropharynx moist.  Neck: Normal range of motion.    Resp: No respiratory distress.   MSK: Moves all extremities.  No obvious deformity.  Neuro: The patient is fully oriented and quite pleasant. Speech normal. Extraocular movements are intact without nystagmus. Facial sensation is intact in V1, V2, V3 distributions. Facial nerve function is normal, rated as a House Brackmann II/VI on the right, without synkinesis. Palate is symmetric. Shoulders are full strength. Tongue is midline. There is no pronator drift. Full strength in all extremities.   Psych: Normal mood and affect. Behavior is normal.      Imaging:  We reviewed has recent MRI and compared it to previous MRI. This demonstrates a slight increase in size of meningioma, about 1 mm.    MR Brain W/O & W Contrast (03/16/2022):  IMPRESSION:  Slight increase size of right pontocerebellar angle meningioma since 12/17/2021.     Assessment:  1. Pancreatic neuroendocrine tumor  2. 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 We reviewed his updated MRI today and thoroughly discussed the small increase in size of his probably meningoma. We discussed options of surveillance with updated imaging in 3-6 months,  microsurgical resection, or radiosurgery.  Early interval followup would be reasonable given the  slight or even equivocal increase in size.  2. Should he wish to proceed with surgery either now or in the future, we discussed risks  including bleeding, infection, CSF leak, neurologic injury, stroke, facial paralysis, hearing loss, swallowing dysfunction, incomplete removal.  3. He would like to follow up at the end of summer, around August 2022 (~5 months since previous scan)  4. I encouraged him to contact us should any questions or concerns arise in advance of his next appointment      It has been a pleasure to participate in the care of your patient. Please feel free to contact us if we may be of any assistance for Mr. Chin.      Quinten Mccray MD   Department of Neurosurgery  [facility]  [facility]         Scribe Disclosure:  I, Heena Newberry, am serving as a scribe to document services personally performed by Quinten Mccray MD at this visit, based upon the provider's statements to me. All documentation has been reviewed by the aforementioned provider prior to being entered into the official medical record.

## 2022-04-18 ENCOUNTER — TELEPHONE (OUTPATIENT)
Dept: NEUROSURGERY | Facility: CLINIC | Age: 62
End: 2022-04-18
Payer: COMMERCIAL

## 2022-04-20 NOTE — TELEPHONE ENCOUNTER
Patient called requesting assistance with getting MRI and follow up scheduled for early August. Additionally, he has questions regarding surgery and travel. He will be travelling to [place] for his daughter's wedding in early December and will be going scuba diving. He would like recommendation on restrictions from neurosurgery.

## 2022-08-02 NOTE — PROGRESS NOTES
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 The patient followed with Dr. Pool Agustin of pancreatic surgery, who opted with surveillance at that time. He underwent DOTATATE imaging (2021) that displayed no metastatic disease, however, a cerebellomedullary angle tumor was found on (2021). At our last visit (3/16/22) he was doing well with no swallowing or balance issues. 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 He was put on Lovenox x 2 weeks and now is taking daily Eliquis. He has plans to travel to [place] in late November for his daughter's wedding, will visit for 3 weeks and [place] in mid to late December. Today he denies new fever, chills, blurry vision, paresthesias, weakness, dizziness, vertigo, falls, or issues with swallowing or chewing.   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      Review of Systems:   Pertinent items are noted in HPI, remainder of complete ROS is negative.      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 He has never smoked. He has 6 children: 4 biological daughters and 2 adopted children. His daughter  works for [employer]. He enjoys skiing and bikes twice per week.     Physical Exam:   BP (!) 174/103   Pulse 51   Resp 16   Wt 109.3 kg (241 lb)   SpO2 99%   General: No acute distress.   Head: No signs of trauma.    Eyes: Conjunctivae are normal. EOM's intact without nystagmus. Left pupil slightly smaller than right, baseline  Resp: No respiratory distress.   MSK: Moves all extremities.  No obvious deformity.  Neuro: The patient is fully oriented and quite pleasant. Speech normal. Facial sensation is intact in V1, V2, V3 distributions. Facial nerve function is normal. Shoulders are full strength. There is no pronator drift. Full strength in all extremities. Sensation intact throughout. No dysmetria with finger-nose-finger testing. Gait has is normal with normal heel/toe walking.  Psych: Normal mood and affect. Behavior is normal.      Imaging:  We reviewed his recent MRI (today) and compared it to the prior MRI from last year. This demonstrates slight increase in the size of his right cerebellomedullary mass without new FLAIR or abnormality.    Assessment:  1. Pancreatic neuroendocrine tumor  2. Recently diagnosed SMV thrombosis  3.   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 He continues to follow with Oncology.  2. Patient is on Eliquis daily, doing well. Found to have a mutation in his Factor 5 Leiden testing with Heme/Onc. He also reports positive Covid-19 infection in 1/2022. Plan for at least 6 months of therapy per patient's discussion with Heme/Onc.  3. Given the slow stable growth on imaging will plan for another close follow up in 3-5 months with repeat MRI and tentative plan for surgical resection late December/early January after his trip to [place]. He was encouraged to reach out with new concerning symptoms or questions in the meantime.     Discussed with Dr. Mccray today who also reviewed imaging and visited with the patient about the above plan.      Poonam Kruger,  GLADIS  Department of Neurosurgery  [facility]  [facility]

## 2022-08-03 ENCOUNTER — ANCILLARY PROCEDURE (OUTPATIENT)
Dept: MRI IMAGING | Facility: CLINIC | Age: 62
End: 2022-08-03
Attending: NEUROLOGICAL SURGERY
Payer: COMMERCIAL

## 2022-08-03 ENCOUNTER — OFFICE VISIT (OUTPATIENT)
Dept: NEUROSURGERY | Facility: CLINIC | Age: 62
End: 2022-08-03
Payer: COMMERCIAL

## 2022-08-03 VITALS
DIASTOLIC BLOOD PRESSURE: 103 MMHG | RESPIRATION RATE: 16 BRPM | WEIGHT: 241 LBS | HEART RATE: 51 BPM | SYSTOLIC BLOOD PRESSURE: 174 MMHG | OXYGEN SATURATION: 99 %

## 2022-08-03 DIAGNOSIS — Z79.01 ANTICOAGULATED: ICD-10-CM

## 2022-08-03 DIAGNOSIS — D32.9 MENINGIOMA (H): Primary | ICD-10-CM

## 2022-08-03 DIAGNOSIS — D32.9 MENINGIOMA (H): ICD-10-CM

## 2022-08-03 PROCEDURE — 99214 OFFICE O/P EST MOD 30 MIN: CPT | Performed by: PHYSICIAN ASSISTANT

## 2022-08-03 PROCEDURE — 70553 MRI BRAIN STEM W/O & W/DYE: CPT | Performed by: RADIOLOGY

## 2022-08-03 PROCEDURE — A9585 GADOBUTROL INJECTION: HCPCS | Performed by: RADIOLOGY

## 2022-08-03 RX ORDER — TADALAFIL 20 MG/1
TABLET ORAL
COMMUNITY
Start: 2022-07-24

## 2022-08-03 RX ORDER — LOSARTAN POTASSIUM 50 MG/1
50 TABLET ORAL DAILY
COMMUNITY
Start: 2022-03-18

## 2022-08-03 RX ORDER — ACETAMINOPHEN 500 MG
1000 TABLET ORAL EVERY 8 HOURS PRN
COMMUNITY
Start: 2022-05-24

## 2022-08-03 RX ORDER — GADOBUTROL 604.72 MG/ML
10 INJECTION INTRAVENOUS ONCE
Status: COMPLETED | OUTPATIENT
Start: 2022-08-03 | End: 2022-08-03

## 2022-08-03 RX ADMIN — GADOBUTROL 10 ML: 604.72 INJECTION INTRAVENOUS at 08:33

## 2022-08-03 ASSESSMENT — PAIN SCALES - GENERAL: PAINLEVEL: NO PAIN (0)

## 2022-08-03 NOTE — LETTER
Date:August 3, 2022      Provider requested that no letter be sent. Do not send.       [facility]

## 2022-08-03 NOTE — PATIENT INSTRUCTIONS
Thank you for choosing [facility]. You were seen in the Neurosurgery Clinic today with Dr. Mccray.    Next steps:  We will reach out to you regarding next steps for surgery.  Complete audiogram 1-2 months.  Updated MRI closer to surgery.  In person visit closer to surgery.    Please don't hesitate to reach out via MyChart or telephone if you have any questions after your visit.    Erica Charles RN Care Coordinator, Neurosurgery    Direct: [phone]  Neurosurgery Clinic: [phone]

## 2022-08-03 NOTE — LETTER
8/3/2022       RE: Kev Chin  [address]  [facility] [address]     Dear Colleague,    Thank you for referring your patient, Kev Chin, to the [facility] [place] at [facility]. Please see a copy of my visit note below.      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 The patient followed with Dr. Pool Agustin of pancreatic surgery, who opted with surveillance at that time. He underwent DOTATATE imaging (2021) that displayed no metastatic disease, however, a cerebellomedullary angle tumor was found on (2021). At our last visit (3/16/22) he was doing well with no swallowing or balance issues. Today he reports that he continues to feel well in a general sense though in May of this year presented to [facility] with abdominal pain and was found to have SMV thrombosis on imaging. He was put on Lovenox x 2 weeks and now is taking daily Eliquis. He has plans to travel to [place] in late November for his daughter's wedding, will visit for 3 weeks and [place] in mid to late December. Today he denies new fever, chills, blurry vision, paresthesias, weakness, dizziness, vertigo, falls, or issues with swallowing or chewing.   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      Review of Systems:   Pertinent items are noted in HPI, remainder of complete ROS is negative.      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 He has never smoked. He has 6 children: 4 biological daughters and 2 adopted children. His daughter works for [employer]. He enjoys skiing and bikes twice per week.     Physical Exam:   BP (!) 174/103   Pulse 51   Resp 16   Wt 109.3 kg (241 lb)   SpO2 99%   General: No acute distress.   Head: No signs of trauma.    Eyes: Conjunctivae are normal. EOM's intact without nystagmus. Left pupil slightly smaller than right, baseline  Resp: No respiratory distress.   MSK: Moves all extremities.  No obvious deformity.  Neuro: The patient is fully oriented and quite pleasant. Speech normal. Facial sensation is intact in V1, V2, V3 distributions. Facial nerve function is normal. Shoulders are full strength. There is no pronator drift. Full strength in all extremities. Sensation intact throughout. No dysmetria with finger-nose-finger testing. Gait has is normal with normal heel/toe walking.  Psych: Normal mood and affect. Behavior is normal.      Imaging:  We reviewed his recent MRI (today) and compared it to the prior MRI from last year. This demonstrates slight increase in the size of his right cerebellomedullary mass without new FLAIR or abnormality.    Assessment:  1. Pancreatic neuroendocrine tumor  2. Recently diagnosed SMV thrombosis  3.   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 He continues to follow with Oncology.  2. Patient is on Eliquis daily, doing well. Found to have a mutation in his Factor 5 Leiden testing with Heme/Onc. He also reports positive Covid-19 infection in 1/2022. Plan for at least 6 months of therapy per patient's discussion with Heme/Onc.  3. Given the slow stable growth on imaging will plan for another close follow up in 3-5 months with repeat MRI and tentative  plan for surgical resection late December/early January after his trip to [place]. He was encouraged to reach out with new concerning symptoms or questions in the meantime.     Discussed with Dr. Mccray today who also reviewed imaging and visited with the patient about the above plan.      Poonam Kruger PA-C  Department of Neurosurgery  [facility]  [facility]                  Again, thank you for allowing me to participate in the care of your patient.      Sincerely,    Poonam Kruger PA-C

## 2022-08-22 ENCOUNTER — OFFICE VISIT (OUTPATIENT)
Dept: AUDIOLOGY | Facility: CLINIC | Age: 62
End: 2022-08-22
Attending: PHYSICIAN ASSISTANT
Payer: COMMERCIAL

## 2022-08-22 DIAGNOSIS — H90.3 SENSORINEURAL HEARING LOSS, BILATERAL: Primary | ICD-10-CM

## 2022-08-22 DIAGNOSIS — D32.9 MENINGIOMA (H): ICD-10-CM

## 2022-08-22 DIAGNOSIS — H93.13 TINNITUS OF BOTH EARS: ICD-10-CM

## 2022-08-22 PROCEDURE — 92550 TYMPANOMETRY & REFLEX THRESH: CPT | Performed by: AUDIOLOGIST

## 2022-08-22 PROCEDURE — 92557 COMPREHENSIVE HEARING TEST: CPT | Performed by: AUDIOLOGIST

## 2022-08-22 NOTE — PROGRESS NOTES
AUDIOLOGY REPORT    SUBJECTIVE:  Kev Chin is a 62 year old male who was seen in the Audiology Clinic at the [facility] for audiologic evaluation, referred by Poonam Kruger PA-C. The patient reports occasional bilateral tinnitus (non pulsatile and not debilitating), and difficulty hearing in noisier places, such as restaurants. He has a diagnosis of cerebellomedullary angle tumor/meningioma, found on imaging 12-16-21, and a pancreatic neuroendocrine tumor diagnosed in May 2021. He has plans for surgical intervention for the former this coming December, but has elected not to treat the latter at this time, and is being followed by neurosurgery. He denied difficulty hearing in quieter spaces or on the phone, as well as otalgia, otorrhea, recent illness, dizziness, aural fullness, or significant history of noise exposure.       OBJECTIVE:  Abuse Screening:  Do you feel unsafe at home or work/school? No  Do you feel threatened by someone? No  Does anyone try to keep you from having contact with others, or doing things outside of your home? No  Physical signs of abuse present? No     Fall Risk Screen:  1. Have you fallen two or more times in the past year? No  2. Have you fallen and had an injury in the past year? No    Timed Up and Go Score (in seconds): not tested  Is patient a fall risk? No  Referral initiated: No  Fall Risk Assessment Completed by Audiology    Otoscopic exam indicates ears are clear of cerumen bilaterally.     Pure Tone Thresholds assessed using conventional audiometry with good  reliability from 250-8000 Hz bilaterally using insert earphones and circumaural headphones.     RIGHT:  normal sloping to moderate sensorineural hearing loss    LEFT:    normal sloping to moderate sensorineural hearing loss    Tympanogram:    RIGHT: normal eardrum mobility    LEFT:   normal eardrum mobility    Reflexes for 1000 Hz (reported by stimulus ear):  RIGHT: Ipsilateral is present at  normal levels  RIGHT: Contralateral is present at normal levels  LEFT:   Ipsilateral is present at normal levels  LEFT:   Contralateral is present at normal levels      Speech Reception Threshold:    RIGHT: 10 dB HL    LEFT:   5 dB HL  Word Recognition Score:     RIGHT: 100% at 50 dB HL using NU-6 recorded word list.    LEFT:   100% at 50 dB HL using NU-6 recorded word list.      ASSESSMENT:     ICD-10-CM    1. Sensorineural hearing loss, bilateral  H90.3    2. Meningioma (H)  D32.9 Adult Audiology  Referral   3. Tinnitus of both ears  H93.13        Today s results were discussed with the patient in detail.     PLAN:  Patient was counseled regarding hearing loss and impact on communication. Patient is a good candidate for amplification at this time. Appropriate communication strategies were discussed at length, as were reasonable expectations regarding hearing at a distance, in noisy environments, or when attention is diverted elsewhere. Tinnitus triggers and management strategies were also discussed.    Mr. Chin should follow-up with his referring provider. Hearing sensitivity should be reassessed at least annually to monitor, or per medical management/neurological protocol or patient concern. Wear hearing protection consistently in noise to preserve residual hearing sensitivity and to minimize the effects of tinnitus. Mr. Chin expressed verbal understanding of this information and plan.  Please call this clinic with questions regarding these results or recommendations.        Cris Dumont, [facility]  [place] Licensed Audiologist [id number]

## 2022-09-22 ENCOUNTER — PREP FOR PROCEDURE (OUTPATIENT)
Dept: NEUROLOGY | Facility: CLINIC | Age: 62
End: 2022-09-22

## 2022-09-22 ENCOUNTER — TELEPHONE (OUTPATIENT)
Dept: NEUROSURGERY | Facility: CLINIC | Age: 62
End: 2022-09-22

## 2022-09-22 DIAGNOSIS — D32.9 MENINGIOMA (H): Primary | ICD-10-CM

## 2022-09-27 ENCOUNTER — MYC MEDICAL ADVICE (OUTPATIENT)
Dept: NEUROSURGERY | Facility: CLINIC | Age: 62
End: 2022-09-27

## 2022-10-16 ENCOUNTER — HEALTH MAINTENANCE LETTER (OUTPATIENT)
Age: 62
End: 2022-10-16

## 2022-10-17 ENCOUNTER — TELEPHONE (OUTPATIENT)
Dept: NEUROSURGERY | Facility: CLINIC | Age: 62
End: 2022-10-17

## 2022-10-17 NOTE — TELEPHONE ENCOUNTER
I called the patient in regards to scheduling surgery with Dr. Mccray. Patient has a covid test scheduled at [facility] and pre op has been taken care of with PAC via video. Patient is aware that the nursing team will be reaching out within the next few days. I did tell patient that a nurse will reach out within 2-3 days prior to surgery with arrival time and instructions.    Surgeon: Dr. Mccray  Date of Surgery: 12/27/2022  Location of surgery: [facility]  Pre-Op H&P: 12/20/2022  Post-Op Appt Date: Calendar not open for 1/23   Imaging needed:  Yes  Discussed COVID-19 testing:  Yes  Pre-cert/Authorization completed:  Yes  Patient aware that pre-op RN will call 2-3 days prior to surgery with arrival time and instructions Yes  Packet sent out: No 10/17/22  Patient was instructed to review packet and call back with any questions or concerns.     Note:  Provider calendar not open yet       Abigail Torres on 10/17/2022 at 1:38 PM

## 2022-10-17 NOTE — TELEPHONE ENCOUNTER
[facility] Call Center    Phone Message    May a detailed message be left on voicemail: yes     Reason for Call: Pt is requesting a call back to schedule surgery.  He hasn't heard anything for several weeks so he would like a call back to set it up.  Thanks.

## 2022-10-17 NOTE — TELEPHONE ENCOUNTER
I called and left a voicemail for patient in regards to surgery with Dr. Mccray.         Abigail Torres on 10/17/2022 at 1:06 PM

## 2022-10-18 DIAGNOSIS — D32.9 MENINGIOMA (H): Primary | ICD-10-CM

## 2022-10-18 NOTE — TELEPHONE ENCOUNTER
I called the patient in regards to scheduling surgery with Dr. Mccray. Patient has a covid test scheduled at [facility] and pre op has been taken care of with PAC via video. Patient is aware that the nursing team will be reaching out within the next few days. I did tell patient that a nurse will reach out within 2-3 days prior to surgery with arrival time and instructions.    Surgeon: Dr. Mccray  Date of Surgery: 12/27/2022  Location of surgery: [facility]  Pre-Op H&P: 12/20/2022  Post-Op Appt Date: TBD   Imaging needed:  Yes  Discussed COVID-19 testing:  Yes  Pre-cert/Authorization completed:  Yes  Patient aware that pre-op RN will call 2-3 days prior to surgery with arrival time and instructions Yes  Packet sent out: No 10/18/22  Patient was instructed to review packet and call back with any questions or concerns.       Note:    Provider template is not open for January       Abigail Torres on 10/18/2022 at 10:08 AM

## 2022-10-18 NOTE — TELEPHONE ENCOUNTER
FUTURE VISIT INFORMATION      SURGERY INFORMATION:    Date: 22    [facility]    Surgeon:  Quinten Mccray MD    Anesthesia Type:  General    Procedure: Right retromastoid craniotomy/craniectomy for tumor abdominal fat graft    RECORDS REQUESTED FROM:       Primary Care Provider: Stella Bazzi APRN, CNP - [facility]    Pertinent Medical History: hypertension    Most recent EKG+ Tracin22- [facility]

## 2022-12-19 NOTE — PHARMACY - PREOPERATIVE ASSESSMENT CENTER
Preoperative Assessment Center Medication History Note    Medication history completed on December 19, 2022 by this writer. See Epic admission navigator for prior to admission medications. Operating room staff will still need to confirm medications and last dose information on day of surgery.     Medication history interview sources  Patient interview: Yes  Care Everywhere records: Yes  Surescripts pharmacy refill records: Yes  Other (if applicable): None    Changes made to PTA medication list  Added: None    Deleted:   -- Losartan = duplicate    Changed:   -- added directions for apixaban  -- Added directions for acetaminophen    Additional medication history information (including reliability of information, actions taken by pharmacist):    -- No recent (within 30 days) course of antibiotics  -- No recent (within 30 days) course of systemic steroids  -- Reports being on blood thinning medications apixaban   -- Declines being on any other prescription or over-the-counter medications  -- rare use of tadalfil    Prior to Admission medications    Medication Sig Last Dose Taking? Auth Provider Long Term End Date   acetaminophen (TYLENOL) 500 MG tablet Take 1,000 mg by mouth every 8 hours as needed Taking Yes Reported, Patient     amLODIPine (NORVASC) 5 MG tablet Take 1 tablet by mouth daily Taking Yes Reported, Patient Yes    apixaban ANTICOAGULANT (ELIQUIS) 5 MG tablet Take 5 mg by mouth 2 times daily Taking Yes Reported, Patient     losartan (COZAAR) 50 MG tablet Take 50 mg by mouth daily Taking Yes Reported, Patient Yes    tadalafil (CIALIS) 20 MG tablet TAKE 1 TABLET BY MOUTH 30 TO 60 MINUTES BEFORE INTERCOURSE AS NEEDED. TAKE ONLY ONCE DAILY  Patient not taking: Reported on 12/19/2022 Not Taking  Reported, Patient Yes          Medication history completed by: Deng Alvarado RPH

## 2022-12-19 NOTE — PHARMACY - PREOPERATIVE ASSESSMENT CENTER
Anticoagulation Note - Preoperative Assessment Center (PAC) Pharmacist     Patient was interviewed on December 19, 2022 as a part of PAC clinic appointment. The purpose of this note is to document the perioperative anticoagulation plan outlined by the providers caring for Kev Chin.     Current Regimen  Anticoagulation Regimen as of December 19, 2022: apixaban 5mg by mouth twice daily   Indication: Thrombus (05/2022)  Prescriber:  Dr. Hein  Expected Duration of therapy: unknown    Perioperative plan  Kev Chin is scheduled for Right retromastoid craniotomy/craniectomy for tumor, abdominal fat graft on 12/27/22 with Dr. Mccray and the perioperative anticoagulation plan outlined by PAC and Dr. Mccray is to hold apixaban 3 days prior.  Last dose will be the evening of 12/23.   Resumption of anticoagulation after procedure will be based on surgery team assessment of bleeding risks and complications.  This plan may require re-assessment and modification by his primary team in the perioperative setting depending on patients clinical situation.        Deng Alvarado RPH  December 19, 2022  10:21 AM

## 2022-12-20 ENCOUNTER — ANESTHESIA EVENT (OUTPATIENT)
Dept: SURGERY | Facility: CLINIC | Age: 62
DRG: 026 | End: 2022-12-20
Payer: COMMERCIAL

## 2022-12-20 ENCOUNTER — MEDICAL CORRESPONDENCE (OUTPATIENT)
Dept: SURGERY | Facility: CLINIC | Age: 62
End: 2022-12-20

## 2022-12-20 ENCOUNTER — PRE VISIT (OUTPATIENT)
Dept: SURGERY | Facility: CLINIC | Age: 62
End: 2022-12-20

## 2022-12-20 ENCOUNTER — VIRTUAL VISIT (OUTPATIENT)
Dept: SURGERY | Facility: CLINIC | Age: 62
End: 2022-12-20
Payer: COMMERCIAL

## 2022-12-20 DIAGNOSIS — Z01.818 PRE-OP EXAMINATION: Primary | ICD-10-CM

## 2022-12-20 DIAGNOSIS — D32.9 MENINGIOMA (H): ICD-10-CM

## 2022-12-20 PROCEDURE — 99204 OFFICE O/P NEW MOD 45 MIN: CPT | Performed by: PHYSICIAN ASSISTANT

## 2022-12-20 NOTE — H&P
Pre-Operative H & P     CC:  Preoperative exam to assess for increased cardiopulmonary risk while undergoing surgery and anesthesia.    Date of Encounter: 12/20/2022  Primary Care Physician:  No Ref-Primary, Physician     Reason for visit:   Encounter Diagnoses   Name Primary?     Pre-op examination Yes     Meningioma (H)        HPI  Kev Chin is a 62 year old male who presents for pre-operative H & P in preparation for  Procedure Information     Case: [id number] Date/Time: 12/27/22 0800    Procedures:       Right retromastoid craniotomy/craniectomy for tumor (Right: Head)      abdominal fat graft (Update)    Anesthesia type: General    Diagnosis: Meningioma (H) [D32.9]    Pre-op diagnosis: Meningioma (H) [D32.9]    Location:  OR  /  OR    Providers: Quinten Mccray MD          The patient is a 62-year-old man with a past medical history significant for hypertension, history of COVID infection, heterozygous for factor V Leyden, history of SMV thrombosis, 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  He was seen by neurosurgery on 8/3/2022 and they discussed his findings and treatment options.  The plan was for repeat MRI and surgical resection.  The patient is now scheduled for the procedure as above.    History is obtained from the patient and chart review    Hx of abnormal bleeding or anti-platelet use: none      Past Medical History  Past Medical History:   Diagnosis Date     BPH (benign prostatic hyperplasia)      ED (erectile dysfunction)      Heterozygous factor V Leiden mutation (H)      History of 2019 novel coronavirus disease (COVID-19)      HTN (hypertension)      Meningioma (H)      Neuroendocrine tumor of pancreas      Superior mesenteric vein thrombosis (H)        Past Surgical History  Past Surgical History:   Procedure Laterality Date     CATARACT EXTRACTION  1993     LASER SURGERY OF EYE  09/2021     [facility]  endoscopic ultrasound  06/2021     QUADRACEPS TENDON REPAIR  2016     REPR RETINAL DETACH; SCLERAL BUCKL  1994     RETINAL DETACHMENT SURGERY  2003     RETINAL DETACHMENT SURGERY  2018       Prior to Admission Medications  Current Outpatient Medications   Medication Sig Dispense Refill     acetaminophen (TYLENOL) 500 MG tablet Take 1,000 mg by mouth every 8 hours as needed       amLODIPine (NORVASC) 5 MG tablet Take 1 tablet by mouth daily       apixaban ANTICOAGULANT (ELIQUIS) 5 MG tablet Take 5 mg by mouth 2 times daily       losartan (COZAAR) 50 MG tablet Take 50 mg by mouth daily       tadalafil (CIALIS) 20 MG tablet TAKE 1 TABLET BY MOUTH 30 TO 60 MINUTES BEFORE INTERCOURSE AS NEEDED. TAKE ONLY ONCE DAILY (Patient not taking: Reported on 12/19/2022)         Allergies  Allergies   Allergen Reactions     Quinolones      Thimerosal Unknown       Social History  Social History     Socioeconomic History     Marital status:      Spouse name: Not on file     Number of children: Not on file     Years of education: Not on file     Highest education level: Not on file   Occupational History     Not on file   Tobacco Use     Smoking status: Never     Smokeless tobacco: Never   Substance and Sexual Activity     Alcohol use: Yes     Drug use: Never     Sexual activity: Not on file   Other Topics Concern     Parent/sibling w/ CABG, MI or angioplasty before 65F 55M? Not Asked   Social History Narrative     Not on file     Social Determinants of Health     Financial Resource Strain: Not on file   Food Insecurity: Not on file   Transportation Needs: Not on file   Physical Activity: Not on file   Stress: Not on file   Social Connections: Not on file   Intimate Partner Violence: Not on file   Housing Stability: Not on file       Family History  Family History   Problem Relation Age of Onset     Anesthesia Reaction No family hx of      Venous thrombosis No family hx of        Review of Systems  The complete review of  systems is negative other than noted in the HPI or here.   Anesthesia Evaluation   Pt has had prior anesthetic. Type: MAC and General.        ROS/MED HX  ENT/Pulmonary:  - neg pulmonary ROS     Neurologic:  - neg neurologic ROS     Cardiovascular:     (+) hypertension-----Previous cardiac testing   Echo: Date: Results:    Stress Test: Date: Results:    ECG Reviewed: Date: 5/23/22 Results:  SR  Cath: Date: Results:      METS/Exercise Tolerance: >4 METS    Hematologic: Comments: SMV thrombosis 5/2022    Factor V leiden heterozygous      Musculoskeletal:  - neg musculoskeletal ROS     GI/Hepatic:     (+) GERD (night time symptomatic based on if foods have been eaten late at night), Other, hiatal hernia,     Renal/Genitourinary: Comment: ED    (+) BPH,     Endo:  - neg endo ROS     Psychiatric/Substance Use:  - neg psychiatric ROS     Infectious Disease: Comment: COVID-19 infection 1/2022      Malignancy:   (+) Malignancy, History of Other and Skin.Skin CA Remission status post.  Other CA neuroendocrine tumor at head of pancreas Active status post.    Other:  - neg other ROS          Virtual visit -  No vitals were obtained    Physical Exam  Constitutional: Awake, alert, cooperative, no apparent distress, and appears stated age.  Eyes: Pupils equal  HENT: Normocephalic  Respiratory: non labored breathing   Neurologic: Awake, alert, oriented to name, place and time.   Neuropsychiatric: Calm, cooperative. Normal affect.      Prior Labs/Diagnostic Studies   All labs and imaging personally reviewed     EKG/ stress test - if available please see in ROS above     10/17/22 CT Chest Abd Pelvis W IV Cont     IMPRESSION:     1. Stable size and appearance of dense/hyperenhancing mass at the pancreatic head/uncinate.   2. No convincing evidence of metastatic disease in the chest, abdomen, or pelvis.   3. 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 Recanalization of previously thrombosed SMV.   4. A few small scattered technically indeterminate pulmonary nodules, unchanged. These can be reassessed at follow-up.      The patient's records and results personally reviewed by this provider.     Outside records reviewed from: Care Everywhere      Assessment      Kev Chin is a 62 year old male seen as a PAC referral for risk assessment and optimization for anesthesia.    Plan/Recommendations  Pt will be optimized for the proposed procedure.  See below for details on the assessment, risk, and preoperative recommendations    NEUROLOGY  - meningioma -procedure as above.  -Post Op delirium risk factors:  No risk identified    ENT  - No current airway concerns.  Will need to be reassessed day of surgery.  Mallampati: Unable to assess  TM: Unable to assess    CARDIAC  - Hypertension  Well controlled -the patient's past blood pressures have been elevated.  He reports this is likely due to having to run from his MRI to the appointment in late as well as traffic.  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  He has followed with his primary care in the past for his blood pressure and tried different doses of his medication.  He will plan to continue amlodipine and hold Cozaar on the day of surgery.  - METS (Metabolic Equivalents)  Patient performs 4 or more METS exercise without symptoms            Total Score: 0      RCRI-Very low risk: Class 1 0.4% complication rate            Total Score: 0    ~The patient is very active with skiing and biking.  He denies any cardiac symptoms.    PULMONARY  - Obstructive Sleep Apnea  No current risk of obstructive sleep apnea   EDELMIRA Medium Risk            Total Score: 3    EDELMIRA: Hypertension    EDELMIRA: Over 50 ys old    EDELMIRA: Male      - Denies asthma or inhaler use  - Tobacco History      History   Smoking Status     Never   Smokeless Tobacco     Never        GI  - Hiatal hernia-patient reports that he will at times at nighttime to have acid reflux but this is only related to if he is eating late at night.  To treat this he will elevate his head.  PONV Medium Risk  Total Score: 2           1 AN PONV: Patient is not a current smoker    1 AN PONV: Intended Post Op Opioids    ~Neuroendocrine tumor at the head of the pancreas -followed by outside oncologist and recently had follow-up scans on October 17, 2022 which shows stable disease.  He is currently continuing on surveillance.  The patient today does have questions about carcinoid syndrome with anesthesia.  The patient's tumor has never excreted hormones and given the stability of his recent imaging do not expect further testing is needed.    /RENAL  - Baseline Creatinine  0.94  ~ BPH -patient denies any current urinary issues  ~ ED -hold Cialis 72 hours prior    ENDOCRINE    - BMI: There is no height or weight on file to calculate BMI.  Overweight (BMI 25.0-29.9)  - No history of Diabetes Mellitus    HEME  VTE High Risk 3%            Total Score: 11    VTE: Greater than 59 yrs old    VTE: Male    VTE: Pt history of VTE    VTE: Current cancer      - History of SMV thrombosis in 5/2022. Coagulopathy second to Apixaban (Eliquis) -please see Pharm.D. note.  Plan to hold Eliquis 72 hours prior to surgery. The patient is factor V leiden heterozygous.   ~We will plan to check labs and a type and screen prior to surgery    ID  ~ History of COVID-19 infection in 1/2022 - no residual issues.       Different anesthesia methods/types have been discussed with the patient, but they are aware that the final plan will be decided by the assigned anesthesia provider on the date of service.    Patient was discussed with Dr Zheng    The patient is optimized for their procedure. AVS with information on surgery time/arrival time, meds and NPO status given by nursing staff. No further diagnostic testing indicated.    Please refer to  the physical examination documented by the anesthesiologist in the anesthesia record on the day of surgery.    Video-Visit Details    Type of service:  Video Visit    Provider received verbal consent for a Video Visit from the patient? Yes    Video Start Time: 11:06  Video End Time (time video stopped): 11:21    Originating Location (pt. Location): Home    Distant Location (provider location): Off-site    Mode of Communication:  Video Conference via [employer]  On the day of service:     Prep time: 9 minutes  Visit time: 16 minutes  Documentation time: 20 minutes  ------------------------------------------  Total time: 45 minutes      Taylor Pascal PA-C  Preoperative Assessment Center  [facility]  [facility]  Phone: [phone]  Fax: [phone]

## 2022-12-20 NOTE — PROGRESS NOTES
Kev is a 62 year old who is being evaluated via a billable video visit.      How would you like to obtain your AVS? MyChart        HPI         Review of Systems       Physical Exam     NAMAN Mendez LPN

## 2022-12-20 NOTE — PATIENT INSTRUCTIONS
Preparing for Your Surgery      Name:  Kev Chin   MRN:  [id number]   :  1960   Today's Date:  2022       Arriving for surgery:  Surgery date:  22  Arrival time:  6 am     Surgeries and procedures: Adult patients can have 2 visitors all through the surgery process.     Visiting hours: 8 a.m. to 8:30 p.m.     Hospital: Adult patients and children under age 18 can have 4 visitor at a time     No visitors under the age of 5 are allowed for hospital patients.  Double occupancy rooms: Patients can have only two visitors at a time.     Patients with disabilities: Can have a support person with them (family member, service provider     Or someone well informed about their needs) plus the allowed number of visitors     Patients confirmed or suspected to have symptoms of COVID 19 or flu:     No visitors allowed for adult patients.   Children (under age 18) can have 1 named visitor.     People who are sick or showing symptoms of COVID 19 or flu:    Are not allowed to visit patients--we can only make exceptions in special situations.     Please follow these guidelines for your visit:   Arrive wearing a mask over your mouth and nose; we will give you a medical mask to wear    If you arrive wearing a cloth mask.   Keep it on during your entire visit, even when in patient's room.   If you don't wear a mask we'll ask you to leave.     Clean your hands with alcohol hand . Do this when you arrive at and leave the building and patient room,    And again after you touch your mask or anything in the room.     You can t visit if you have a fever, cough, shortness of breath, muscle aches, headaches, sore throat    Or diarrhea      Stay 6 feet away from others during your visit and between visits     Go directly to and from the room you are visiting.     Stay in the patient s room during your visit. Limit going to other places in the hospital as much as possible     Leave bags and jackets at home or in the  car.     For everyone s health, please don t come and go during your visit. That includes for smoking   during your visit.     Please come to:  [facility] [address]  [address] [place]  [address]    - ? parking is available in front of the hospital for those with mobility issues.   -    Parking is also available at the Patient Visitor Ramp on [address] and [facility].    -  When entering the hospital, you will be asked some Covid screening questions and directed to Patient Registration. Patient Registration will then direct you to the 3rd floor Surgery Waiting Room. [phone]?     - ?If you are in need of directions, wheelchair or escort please stop at the Information Desk in the lobby.  Inform the information person that you are here for surgery; a wheelchair and escort to [address] will be provided.?     What can I eat or drink?  -  You may eat and drink normally up to 8 hours prior to arrival time. (Until 10 pm 12-26-22)  -  You may have clear liquids until 2 hours prior to arrival time. (Until 4 am)    Examples of clear liquids:  Water  Clear broth  Juices (apple, white grape, white cranberry  and cider) without pulp  Noncarbonated, powder based beverages  (lemonade and Ray-Aid)  Sodas (Sprite, 7-Up, ginger ale and seltzer)  Coffee or tea (without milk or cream)  Gatorade    -  No Alcohol for at least 24 hours before surgery.     Which medicines can I take?  Hold Aspirin for 7 days before surgery.   Hold Multivitamins for 7 days before surgery.  Hold Supplements for 7 days before surgery.  Hold Ibuprofen (Advil, Motrin) for 1 day before surgery--unless otherwise directed by surgeon.  Hold Naproxen (Aleve) for 4 days before surgery.     Hold Apixaban (Eliquis) for 3 days prior to surgery. Take the last Eliquis on 12-23-22 PM, and then hold until surgery.     Hold Tadalafil (Cialis) for 3 days prior to surgery.     -  DO NOT take these  medications the day of surgery:  Losartan (Cozaar),     -  PLEASE TAKE these medications the day of surgery:  Amlodipine (Norvasc),  Acetaminophen (Tylenol) if needed    How do I prepare myself?  - Please take 2 showers before surgery using Scrubcare or Hibiclens soap.    Use this soap only from the neck to your toes.     Leave the soap on your skin for one minute--then rinse thoroughly.      You may use your own shampoo and conditioner. No other hair products.   - Please remove all jewelry and body piercings.  - No lotions, deodorants or fragrance.  - Bring your ID and insurance card.    -If you have a Deep Brain Stimulator, Spinal Cord Stimulator, or any Neuro Stimulator device---you must bring the remote control to the hospital.      ALL PATIENTS GOING HOME THE SAME DAY OF SURGERY ARE REQUIRED TO HAVE A RESPONSIBLE ADULT TO DRIVE AND BE IN ATTENDANCE WITH THEM FOR 24 HOURS FOLLOWING SURGERY.    Covid testing policy as of 12/06/2022  Your surgeon will notify and schedule you for a COVID test if one is needed before surgery--please direct any questions or COVID symptoms to your surgeon    Questions or Concerns:    - For any questions regarding the day of surgery or your hospital stay, please contact the Pre Admission Nursing Office at [phone].   - If you have health changes between today and your surgery, please call your surgeon.   - For questions after surgery, please call your surgeons office.

## 2022-12-23 ENCOUNTER — TELEPHONE (OUTPATIENT)
Dept: NEUROSURGERY | Facility: CLINIC | Age: 62
End: 2022-12-23

## 2022-12-23 NOTE — TELEPHONE ENCOUNTER
Returned patient call. Patient states that he was out of the country for the last 3 weeks and returned home on 12/19. He had a covid exposure while on vacation and tested positive on an at home test today. He reports that his symptoms are very mild and include congestion and laryngitis. He is scheduled for surgery on 12/27/22. Message sent to surgeon and will follow up with patient regarding next steps for rescheduling.

## 2022-12-23 NOTE — TELEPHONE ENCOUNTER
[facility] Call Center    Phone Message    May a detailed message be left on voicemail: yes     Reason for Call: Other: Pt is calling because he has a surgery with Dr. Mccray on 12/27 and it needs to be rescheduled. Pt was just out of town for 3 weeks.and has now tested postive for covid. He would like someone to give him a call to discuss.     Action Taken: Message routed to:  [facility] ([facility]): NEUROSURGERY    Travel Screening: Not Applicable

## 2022-12-23 NOTE — TELEPHONE ENCOUNTER
Writer routed to Neurosurgery Surgery Scheduling  CC Kallie Mahoney, RNCC for Dr. Mccray.    *Patient scheduled for surgery requesting cancel due to Jill Zavala LPN   Neurosurgery

## 2022-12-27 ENCOUNTER — ANESTHESIA (OUTPATIENT)
Dept: SURGERY | Facility: CLINIC | Age: 62
DRG: 026 | End: 2022-12-27
Payer: COMMERCIAL

## 2022-12-27 LAB
ABO/RH(D): NORMAL
ANTIBODY SCREEN: NEGATIVE
SPECIMEN EXPIRATION DATE: NORMAL

## 2022-12-28 ENCOUNTER — LAB (OUTPATIENT)
Dept: LAB | Facility: CLINIC | Age: 62
End: 2022-12-28
Payer: COMMERCIAL

## 2022-12-28 DIAGNOSIS — D32.9 MENINGIOMA (H): ICD-10-CM

## 2022-12-28 DIAGNOSIS — Z01.818 PRE-OP EXAMINATION: ICD-10-CM

## 2022-12-28 LAB
ANION GAP SERPL CALCULATED.3IONS-SCNC: 13 MMOL/L (ref 7–15)
BUN SERPL-MCNC: 20.1 MG/DL (ref 8–23)
CALCIUM SERPL-MCNC: 9.7 MG/DL (ref 8.8–10.2)
CHLORIDE SERPL-SCNC: 102 MMOL/L (ref 98–107)
CREAT SERPL-MCNC: 1.16 MG/DL (ref 0.67–1.17)
DEPRECATED HCO3 PLAS-SCNC: 25 MMOL/L (ref 22–29)
ERYTHROCYTE [DISTWIDTH] IN BLOOD BY AUTOMATED COUNT: 14.6 % (ref 10–15)
GFR SERPL CREATININE-BSD FRML MDRD: 70 ML/MIN/1.73M2
GLUCOSE SERPL-MCNC: 80 MG/DL (ref 70–99)
HCT VFR BLD AUTO: 48 % (ref 40–53)
HGB BLD-MCNC: 16.2 G/DL (ref 13.3–17.7)
MCH RBC QN AUTO: 29 PG (ref 26.5–33)
MCHC RBC AUTO-ENTMCNC: 33.8 G/DL (ref 31.5–36.5)
MCV RBC AUTO: 86 FL (ref 78–100)
PLATELET # BLD AUTO: 205 10E3/UL (ref 150–450)
POTASSIUM SERPL-SCNC: 4.3 MMOL/L (ref 3.4–5.3)
RBC # BLD AUTO: 5.58 10E6/UL (ref 4.4–5.9)
SODIUM SERPL-SCNC: 140 MMOL/L (ref 136–145)
WBC # BLD AUTO: 6.5 10E3/UL (ref 4–11)

## 2022-12-28 PROCEDURE — 86900 BLOOD TYPING SEROLOGIC ABO: CPT

## 2022-12-28 PROCEDURE — 80048 BASIC METABOLIC PNL TOTAL CA: CPT

## 2022-12-28 PROCEDURE — 86901 BLOOD TYPING SEROLOGIC RH(D): CPT

## 2022-12-28 PROCEDURE — 86850 RBC ANTIBODY SCREEN: CPT

## 2022-12-28 PROCEDURE — 85027 COMPLETE CBC AUTOMATED: CPT

## 2022-12-28 PROCEDURE — 36415 COLL VENOUS BLD VENIPUNCTURE: CPT

## 2022-12-30 LAB
BLOOD BANK CHART COMMENT: NORMAL
SPECIMEN EXPIRATION DATE: NORMAL

## 2023-01-01 ASSESSMENT — LIFESTYLE VARIABLES: TOBACCO_USE: 0

## 2023-01-01 ASSESSMENT — COPD QUESTIONNAIRES: COPD: 0

## 2023-01-02 ENCOUNTER — HOSPITAL ENCOUNTER (OUTPATIENT)
Dept: CT IMAGING | Facility: CLINIC | Age: 63
Discharge: HOME | DRG: 026 | End: 2023-01-02
Attending: NEUROLOGICAL SURGERY | Admitting: NEUROLOGICAL SURGERY
Payer: COMMERCIAL

## 2023-01-02 ENCOUNTER — APPOINTMENT (OUTPATIENT)
Dept: CT IMAGING | Facility: CLINIC | Age: 63
DRG: 026 | End: 2023-01-02
Attending: NEUROLOGICAL SURGERY
Payer: COMMERCIAL

## 2023-01-02 ENCOUNTER — HOSPITAL ENCOUNTER (INPATIENT)
Facility: CLINIC | Age: 63
LOS: 4 days | Discharge: HOME | DRG: 026 | End: 2023-01-06
Attending: NEUROLOGICAL SURGERY | Admitting: NEUROLOGICAL SURGERY
Payer: COMMERCIAL

## 2023-01-02 DIAGNOSIS — D32.9 MENINGIOMA (H): ICD-10-CM

## 2023-01-02 DIAGNOSIS — D49.6 BRAIN TUMOR (H): Primary | ICD-10-CM

## 2023-01-02 LAB
GLUCOSE BLDC GLUCOMTR-MCNC: 115 MG/DL (ref 70–99)
GLUCOSE BLDC GLUCOMTR-MCNC: 217 MG/DL (ref 70–99)
SARS-COV-2 RNA RESP QL NAA+PROBE: NEGATIVE

## 2023-01-02 PROCEDURE — 999N000141 HC STATISTIC PRE-PROCEDURE NURSING ASSESSMENT: Performed by: NEUROLOGICAL SURGERY

## 2023-01-02 PROCEDURE — 250N000011 HC RX IP 250 OP 636: Performed by: NURSE ANESTHETIST, CERTIFIED REGISTERED

## 2023-01-02 PROCEDURE — 258N000003 HC RX IP 258 OP 636

## 2023-01-02 PROCEDURE — 70496 CT ANGIOGRAPHY HEAD: CPT

## 2023-01-02 PROCEDURE — 8E09XBZ COMPUTER ASSISTED PROCEDURE OF HEAD AND NECK REGION: ICD-10-PCS | Performed by: NEUROLOGICAL SURGERY

## 2023-01-02 PROCEDURE — 272N000002 HC OR SUPPLY OTHER OPNP: Performed by: NEUROLOGICAL SURGERY

## 2023-01-02 PROCEDURE — 250N000013 HC RX MED GY IP 250 OP 250 PS 637

## 2023-01-02 PROCEDURE — 70496 CT ANGIOGRAPHY HEAD: CPT | Mod: 26 | Performed by: RADIOLOGY

## 2023-01-02 PROCEDURE — 61520 REMOVAL OF BRAIN LESION: CPT | Mod: GC | Performed by: NEUROLOGICAL SURGERY

## 2023-01-02 PROCEDURE — 710N000010 HC RECOVERY PHASE 1, LEVEL 2, PER MIN: Performed by: NEUROLOGICAL SURGERY

## 2023-01-02 PROCEDURE — 250N000011 HC RX IP 250 OP 636: Performed by: STUDENT IN AN ORGANIZED HEALTH CARE EDUCATION/TRAINING PROGRAM

## 2023-01-02 PROCEDURE — C1763 CONN TISS, NON-HUMAN: HCPCS | Performed by: NEUROLOGICAL SURGERY

## 2023-01-02 PROCEDURE — 278N000051 HC OR IMPLANT GENERAL: Performed by: NEUROLOGICAL SURGERY

## 2023-01-02 PROCEDURE — 70450 CT HEAD/BRAIN W/O DYE: CPT | Mod: 77

## 2023-01-02 PROCEDURE — 258N000003 HC RX IP 258 OP 636: Performed by: STUDENT IN AN ORGANIZED HEALTH CARE EDUCATION/TRAINING PROGRAM

## 2023-01-02 PROCEDURE — 250N000011 HC RX IP 250 OP 636: Performed by: NEUROLOGICAL SURGERY

## 2023-01-02 PROCEDURE — 70498 CT ANGIOGRAPHY NECK: CPT

## 2023-01-02 PROCEDURE — C1713 ANCHOR/SCREW BN/BN,TIS/BN: HCPCS | Performed by: NEUROLOGICAL SURGERY

## 2023-01-02 PROCEDURE — 88341 IMHCHEM/IMCYTCHM EA ADD ANTB: CPT | Mod: 26 | Performed by: SPECIALIST

## 2023-01-02 PROCEDURE — 250N000009 HC RX 250: Performed by: NEUROLOGICAL SURGERY

## 2023-01-02 PROCEDURE — 250N000025 HC SEVOFLURANE, PER MIN: Performed by: NEUROLOGICAL SURGERY

## 2023-01-02 PROCEDURE — U0003 INFECTIOUS AGENT DETECTION BY NUCLEIC ACID (DNA OR RNA); SEVERE ACUTE RESPIRATORY SYNDROME CORONAVIRUS 2 (SARS-COV-2) (CORONAVIRUS DISEASE [COVID-19]), AMPLIFIED PROBE TECHNIQUE, MAKING USE OF HIGH THROUGHPUT TECHNOLOGIES AS DESCRIBED BY CMS-2020-01-R: HCPCS

## 2023-01-02 PROCEDURE — 15769 GRFG AUTOL SOFT TISS DIR EXC: CPT | Mod: 51 | Performed by: NEUROLOGICAL SURGERY

## 2023-01-02 PROCEDURE — 272N000480 CT HEAD W/O CONTRAST

## 2023-01-02 PROCEDURE — 250N000009 HC RX 250: Performed by: STUDENT IN AN ORGANIZED HEALTH CARE EDUCATION/TRAINING PROGRAM

## 2023-01-02 PROCEDURE — 370N000017 HC ANESTHESIA TECHNICAL FEE, PER MIN: Performed by: NEUROLOGICAL SURGERY

## 2023-01-02 PROCEDURE — 88342 IMHCHEM/IMCYTCHM 1ST ANTB: CPT | Mod: 26 | Performed by: SPECIALIST

## 2023-01-02 PROCEDURE — 88307 TISSUE EXAM BY PATHOLOGIST: CPT | Mod: 26 | Performed by: SPECIALIST

## 2023-01-02 PROCEDURE — 250N000011 HC RX IP 250 OP 636

## 2023-01-02 PROCEDURE — 272N000001 HC OR GENERAL SUPPLY STERILE: Performed by: NEUROLOGICAL SURGERY

## 2023-01-02 PROCEDURE — 200N000002 HC R&B ICU UMMC

## 2023-01-02 PROCEDURE — 360N000079 HC SURGERY LEVEL 6, PER MIN: Performed by: NEUROLOGICAL SURGERY

## 2023-01-02 PROCEDURE — 61781 SCAN PROC CRANIAL INTRA: CPT | Performed by: NEUROLOGICAL SURGERY

## 2023-01-02 PROCEDURE — 00B10ZX EXCISION OF CEREBRAL MENINGES, OPEN APPROACH, DIAGNOSTIC: ICD-10-PCS | Performed by: NEUROLOGICAL SURGERY

## 2023-01-02 PROCEDURE — 250N000011 HC RX IP 250 OP 636: Performed by: ANESTHESIOLOGY

## 2023-01-02 PROCEDURE — 250N000009 HC RX 250: Performed by: NURSE ANESTHETIST, CERTIFIED REGISTERED

## 2023-01-02 PROCEDURE — 70498 CT ANGIOGRAPHY NECK: CPT | Mod: 26 | Performed by: RADIOLOGY

## 2023-01-02 PROCEDURE — 258N000003 HC RX IP 258 OP 636: Performed by: NURSE ANESTHETIST, CERTIFIED REGISTERED

## 2023-01-02 PROCEDURE — 00U207Z SUPPLEMENT DURA MATER WITH AUTOLOGOUS TISSUE SUBSTITUTE, OPEN APPROACH: ICD-10-PCS | Performed by: NEUROLOGICAL SURGERY

## 2023-01-02 PROCEDURE — 4A1034Z MONITORING OF CENTRAL NERVOUS ELECTRICAL ACTIVITY, PERCUTANEOUS APPROACH: ICD-10-PCS | Performed by: NEUROLOGICAL SURGERY

## 2023-01-02 PROCEDURE — 0JB80ZZ EXCISION OF ABDOMEN SUBCUTANEOUS TISSUE AND FASCIA, OPEN APPROACH: ICD-10-PCS | Performed by: NEUROLOGICAL SURGERY

## 2023-01-02 PROCEDURE — C1762 CONN TISS, HUMAN(INC FASCIA): HCPCS | Performed by: NEUROLOGICAL SURGERY

## 2023-01-02 PROCEDURE — 88307 TISSUE EXAM BY PATHOLOGIST: CPT | Mod: TC | Performed by: NEUROLOGICAL SURGERY

## 2023-01-02 DEVICE — IMP SCR SYN MATRIX LOW PRO 1.5X04MM SELF DRILL 04.503.104.01: Type: IMPLANTABLE DEVICE | Site: CRANIAL | Status: FUNCTIONAL

## 2023-01-02 DEVICE — GRAFT DUREPAIR DURA MATRIX 2X2" 62100: Type: IMPLANTABLE DEVICE | Site: CRANIAL | Status: FUNCTIONAL

## 2023-01-02 DEVICE — IMP BUR HOLE COVER 17MM LOW PROFILE TI 421.527: Type: IMPLANTABLE DEVICE | Site: CRANIAL | Status: FUNCTIONAL

## 2023-01-02 DEVICE — IMP BUR HOLE COVER 24MM LOW PROFILE TI 421.528: Type: IMPLANTABLE DEVICE | Site: CRANIAL | Status: FUNCTIONAL

## 2023-01-02 RX ORDER — SODIUM CHLORIDE, SODIUM LACTATE, POTASSIUM CHLORIDE, CALCIUM CHLORIDE 600; 310; 30; 20 MG/100ML; MG/100ML; MG/100ML; MG/100ML
INJECTION, SOLUTION INTRAVENOUS CONTINUOUS PRN
Status: DISCONTINUED | OUTPATIENT
Start: 2023-01-02 | End: 2023-01-02

## 2023-01-02 RX ORDER — OXYCODONE HYDROCHLORIDE 5 MG/1
5 TABLET ORAL EVERY 4 HOURS PRN
Status: DISCONTINUED | OUTPATIENT
Start: 2023-01-02 | End: 2023-01-04

## 2023-01-02 RX ORDER — HYDROMORPHONE HCL IN WATER/PF 6 MG/30 ML
0.2 PATIENT CONTROLLED ANALGESIA SYRINGE INTRAVENOUS
Status: DISCONTINUED | OUTPATIENT
Start: 2023-01-02 | End: 2023-01-04

## 2023-01-02 RX ORDER — SODIUM CHLORIDE, SODIUM LACTATE, POTASSIUM CHLORIDE, CALCIUM CHLORIDE 600; 310; 30; 20 MG/100ML; MG/100ML; MG/100ML; MG/100ML
INJECTION, SOLUTION INTRAVENOUS CONTINUOUS
Status: DISCONTINUED | OUTPATIENT
Start: 2023-01-02 | End: 2023-01-02 | Stop reason: HOSPADM

## 2023-01-02 RX ORDER — POLYETHYLENE GLYCOL 3350 17 G/17G
17 POWDER, FOR SOLUTION ORAL DAILY
Status: DISCONTINUED | OUTPATIENT
Start: 2023-01-03 | End: 2023-01-06 | Stop reason: HOSPADM

## 2023-01-02 RX ORDER — CALCIUM CARBONATE 500 MG/1
1000 TABLET, CHEWABLE ORAL 4 TIMES DAILY PRN
Status: DISCONTINUED | OUTPATIENT
Start: 2023-01-02 | End: 2023-01-06 | Stop reason: HOSPADM

## 2023-01-02 RX ORDER — ONDANSETRON 2 MG/ML
4 INJECTION INTRAMUSCULAR; INTRAVENOUS EVERY 30 MIN PRN
Status: DISCONTINUED | OUTPATIENT
Start: 2023-01-02 | End: 2023-01-02 | Stop reason: HOSPADM

## 2023-01-02 RX ORDER — IOPAMIDOL 755 MG/ML
75 INJECTION, SOLUTION INTRAVASCULAR ONCE
Status: COMPLETED | OUTPATIENT
Start: 2023-01-02 | End: 2023-01-02

## 2023-01-02 RX ORDER — LIDOCAINE HYDROCHLORIDE 20 MG/ML
INJECTION, SOLUTION INFILTRATION; PERINEURAL PRN
Status: DISCONTINUED | OUTPATIENT
Start: 2023-01-02 | End: 2023-01-02

## 2023-01-02 RX ORDER — AMLODIPINE BESYLATE 5 MG/1
5 TABLET ORAL DAILY
Status: DISCONTINUED | OUTPATIENT
Start: 2023-01-03 | End: 2023-01-03

## 2023-01-02 RX ORDER — HYDROMORPHONE HYDROCHLORIDE 1 MG/ML
0.2 INJECTION, SOLUTION INTRAMUSCULAR; INTRAVENOUS; SUBCUTANEOUS EVERY 5 MIN PRN
Status: DISCONTINUED | OUTPATIENT
Start: 2023-01-02 | End: 2023-01-02 | Stop reason: HOSPADM

## 2023-01-02 RX ORDER — ONDANSETRON 4 MG/1
4 TABLET, ORALLY DISINTEGRATING ORAL EVERY 30 MIN PRN
Status: DISCONTINUED | OUTPATIENT
Start: 2023-01-02 | End: 2023-01-02 | Stop reason: HOSPADM

## 2023-01-02 RX ORDER — LIDOCAINE 40 MG/G
CREAM TOPICAL
Status: DISCONTINUED | OUTPATIENT
Start: 2023-01-02 | End: 2023-01-02 | Stop reason: HOSPADM

## 2023-01-02 RX ORDER — ONDANSETRON 4 MG/1
4 TABLET, ORALLY DISINTEGRATING ORAL EVERY 6 HOURS PRN
Status: DISCONTINUED | OUTPATIENT
Start: 2023-01-02 | End: 2023-01-06 | Stop reason: HOSPADM

## 2023-01-02 RX ORDER — CEFAZOLIN SODIUM/WATER 2 G/20 ML
2 SYRINGE (ML) INTRAVENOUS
Status: COMPLETED | OUTPATIENT
Start: 2023-01-02 | End: 2023-01-02

## 2023-01-02 RX ORDER — OXYCODONE HYDROCHLORIDE 10 MG/1
10 TABLET ORAL EVERY 4 HOURS PRN
Status: DISCONTINUED | OUTPATIENT
Start: 2023-01-02 | End: 2023-01-04

## 2023-01-02 RX ORDER — DEXAMETHASONE 4 MG/1
4 TABLET ORAL DAILY
Status: COMPLETED | OUTPATIENT
Start: 2023-01-04 | End: 2023-01-04

## 2023-01-02 RX ORDER — NALOXONE HYDROCHLORIDE 0.4 MG/ML
0.4 INJECTION, SOLUTION INTRAMUSCULAR; INTRAVENOUS; SUBCUTANEOUS
Status: DISCONTINUED | OUTPATIENT
Start: 2023-01-02 | End: 2023-01-06 | Stop reason: HOSPADM

## 2023-01-02 RX ORDER — EPHEDRINE SULFATE 50 MG/ML
INJECTION, SOLUTION INTRAMUSCULAR; INTRAVENOUS; SUBCUTANEOUS PRN
Status: DISCONTINUED | OUTPATIENT
Start: 2023-01-02 | End: 2023-01-02

## 2023-01-02 RX ORDER — LABETALOL HYDROCHLORIDE 5 MG/ML
10-40 INJECTION, SOLUTION INTRAVENOUS EVERY 10 MIN PRN
Status: DISCONTINUED | OUTPATIENT
Start: 2023-01-02 | End: 2023-01-06 | Stop reason: HOSPADM

## 2023-01-02 RX ORDER — SODIUM CHLORIDE 9 MG/ML
INJECTION, SOLUTION INTRAVENOUS CONTINUOUS
Status: ACTIVE | OUTPATIENT
Start: 2023-01-02 | End: 2023-01-03

## 2023-01-02 RX ORDER — DEXAMETHASONE 1 MG
1 TABLET ORAL DAILY
Status: DISCONTINUED | OUTPATIENT
Start: 2023-01-07 | End: 2023-01-06 | Stop reason: HOSPADM

## 2023-01-02 RX ORDER — DEXAMETHASONE 2 MG/1
2 TABLET ORAL DAILY
Status: COMPLETED | OUTPATIENT
Start: 2023-01-06 | End: 2023-01-06

## 2023-01-02 RX ORDER — LOSARTAN POTASSIUM 50 MG/1
50 TABLET ORAL DAILY
Status: DISCONTINUED | OUTPATIENT
Start: 2023-01-03 | End: 2023-01-03

## 2023-01-02 RX ORDER — LABETALOL HYDROCHLORIDE 5 MG/ML
10 INJECTION, SOLUTION INTRAVENOUS
Status: COMPLETED | OUTPATIENT
Start: 2023-01-02 | End: 2023-01-02

## 2023-01-02 RX ORDER — BISACODYL 10 MG
10 SUPPOSITORY, RECTAL RECTAL DAILY PRN
Status: DISCONTINUED | OUTPATIENT
Start: 2023-01-02 | End: 2023-01-06 | Stop reason: HOSPADM

## 2023-01-02 RX ORDER — NICOTINE POLACRILEX 4 MG
15-30 LOZENGE BUCCAL
Status: DISCONTINUED | OUTPATIENT
Start: 2023-01-02 | End: 2023-01-02

## 2023-01-02 RX ORDER — MANNITOL 20 G/100ML
INJECTION, SOLUTION INTRAVENOUS PRN
Status: DISCONTINUED | OUTPATIENT
Start: 2023-01-02 | End: 2023-01-02

## 2023-01-02 RX ORDER — HYDRALAZINE HYDROCHLORIDE 20 MG/ML
2.5-5 INJECTION INTRAMUSCULAR; INTRAVENOUS EVERY 10 MIN PRN
Status: DISCONTINUED | OUTPATIENT
Start: 2023-01-02 | End: 2023-01-02 | Stop reason: HOSPADM

## 2023-01-02 RX ORDER — PHENYLEPHRINE HCL IN 0.9% NACL 50MG/250ML
.1-1 PLASTIC BAG, INJECTION (ML) INTRAVENOUS CONTINUOUS
Status: DISCONTINUED | OUTPATIENT
Start: 2023-01-02 | End: 2023-01-02 | Stop reason: HOSPADM

## 2023-01-02 RX ORDER — DEXAMETHASONE 1.5 MG/1
3 TABLET ORAL DAILY
Status: COMPLETED | OUTPATIENT
Start: 2023-01-05 | End: 2023-01-05

## 2023-01-02 RX ORDER — DEXAMETHASONE SODIUM PHOSPHATE 4 MG/ML
INJECTION, SOLUTION INTRA-ARTICULAR; INTRALESIONAL; INTRAMUSCULAR; INTRAVENOUS; SOFT TISSUE PRN
Status: DISCONTINUED | OUTPATIENT
Start: 2023-01-02 | End: 2023-01-02

## 2023-01-02 RX ORDER — HYDROMORPHONE HYDROCHLORIDE 1 MG/ML
0.4 INJECTION, SOLUTION INTRAMUSCULAR; INTRAVENOUS; SUBCUTANEOUS EVERY 5 MIN PRN
Status: DISCONTINUED | OUTPATIENT
Start: 2023-01-02 | End: 2023-01-02 | Stop reason: HOSPADM

## 2023-01-02 RX ORDER — ACETAMINOPHEN 325 MG/1
650 TABLET ORAL EVERY 4 HOURS PRN
Status: DISCONTINUED | OUTPATIENT
Start: 2023-01-05 | End: 2023-01-06 | Stop reason: HOSPADM

## 2023-01-02 RX ORDER — AMOXICILLIN 250 MG
1 CAPSULE ORAL 2 TIMES DAILY
Status: DISCONTINUED | OUTPATIENT
Start: 2023-01-02 | End: 2023-01-06 | Stop reason: HOSPADM

## 2023-01-02 RX ORDER — PROCHLORPERAZINE MALEATE 10 MG
10 TABLET ORAL EVERY 6 HOURS PRN
Status: DISCONTINUED | OUTPATIENT
Start: 2023-01-02 | End: 2023-01-06 | Stop reason: HOSPADM

## 2023-01-02 RX ORDER — FENTANYL CITRATE 50 UG/ML
50 INJECTION, SOLUTION INTRAMUSCULAR; INTRAVENOUS EVERY 5 MIN PRN
Status: DISCONTINUED | OUTPATIENT
Start: 2023-01-02 | End: 2023-01-02 | Stop reason: HOSPADM

## 2023-01-02 RX ORDER — FENTANYL CITRATE 50 UG/ML
INJECTION, SOLUTION INTRAMUSCULAR; INTRAVENOUS PRN
Status: DISCONTINUED | OUTPATIENT
Start: 2023-01-02 | End: 2023-01-02

## 2023-01-02 RX ORDER — NALOXONE HYDROCHLORIDE 0.4 MG/ML
0.2 INJECTION, SOLUTION INTRAMUSCULAR; INTRAVENOUS; SUBCUTANEOUS
Status: DISCONTINUED | OUTPATIENT
Start: 2023-01-02 | End: 2023-01-06 | Stop reason: HOSPADM

## 2023-01-02 RX ORDER — ACETAMINOPHEN 325 MG/1
975 TABLET ORAL EVERY 8 HOURS
Status: COMPLETED | OUTPATIENT
Start: 2023-01-02 | End: 2023-01-05

## 2023-01-02 RX ORDER — BUPIVACAINE HYDROCHLORIDE AND EPINEPHRINE 2.5; 5 MG/ML; UG/ML
INJECTION, SOLUTION INFILTRATION; PERINEURAL PRN
Status: DISCONTINUED | OUTPATIENT
Start: 2023-01-02 | End: 2023-01-02 | Stop reason: HOSPADM

## 2023-01-02 RX ORDER — HYDRALAZINE HYDROCHLORIDE 20 MG/ML
10-20 INJECTION INTRAMUSCULAR; INTRAVENOUS EVERY 30 MIN PRN
Status: DISCONTINUED | OUTPATIENT
Start: 2023-01-02 | End: 2023-01-06 | Stop reason: HOSPADM

## 2023-01-02 RX ORDER — SODIUM CHLORIDE, SODIUM GLUCONATE, SODIUM ACETATE, POTASSIUM CHLORIDE AND MAGNESIUM CHLORIDE 526; 502; 368; 37; 30 MG/100ML; MG/100ML; MG/100ML; MG/100ML; MG/100ML
INJECTION, SOLUTION INTRAVENOUS CONTINUOUS PRN
Status: DISCONTINUED | OUTPATIENT
Start: 2023-01-02 | End: 2023-01-02

## 2023-01-02 RX ORDER — ONDANSETRON 2 MG/ML
INJECTION INTRAMUSCULAR; INTRAVENOUS PRN
Status: DISCONTINUED | OUTPATIENT
Start: 2023-01-02 | End: 2023-01-02

## 2023-01-02 RX ORDER — FENTANYL CITRATE 50 UG/ML
25 INJECTION, SOLUTION INTRAMUSCULAR; INTRAVENOUS EVERY 5 MIN PRN
Status: DISCONTINUED | OUTPATIENT
Start: 2023-01-02 | End: 2023-01-02 | Stop reason: HOSPADM

## 2023-01-02 RX ORDER — LIDOCAINE 40 MG/G
CREAM TOPICAL
Status: DISCONTINUED | OUTPATIENT
Start: 2023-01-02 | End: 2023-01-06 | Stop reason: HOSPADM

## 2023-01-02 RX ORDER — DIMENHYDRINATE 50 MG/ML
25 INJECTION, SOLUTION INTRAMUSCULAR; INTRAVENOUS
Status: DISCONTINUED | OUTPATIENT
Start: 2023-01-02 | End: 2023-01-02 | Stop reason: HOSPADM

## 2023-01-02 RX ORDER — DEXTROSE MONOHYDRATE 25 G/50ML
25-50 INJECTION, SOLUTION INTRAVENOUS
Status: DISCONTINUED | OUTPATIENT
Start: 2023-01-02 | End: 2023-01-02

## 2023-01-02 RX ORDER — PROPOFOL 10 MG/ML
INJECTION, EMULSION INTRAVENOUS PRN
Status: DISCONTINUED | OUTPATIENT
Start: 2023-01-02 | End: 2023-01-02

## 2023-01-02 RX ORDER — HALOPERIDOL 5 MG/ML
1 INJECTION INTRAMUSCULAR
Status: DISCONTINUED | OUTPATIENT
Start: 2023-01-02 | End: 2023-01-02 | Stop reason: HOSPADM

## 2023-01-02 RX ORDER — ONDANSETRON 2 MG/ML
4 INJECTION INTRAMUSCULAR; INTRAVENOUS EVERY 6 HOURS PRN
Status: DISCONTINUED | OUTPATIENT
Start: 2023-01-02 | End: 2023-01-06 | Stop reason: HOSPADM

## 2023-01-02 RX ORDER — HYDROMORPHONE HCL IN WATER/PF 6 MG/30 ML
0.4 PATIENT CONTROLLED ANALGESIA SYRINGE INTRAVENOUS
Status: DISCONTINUED | OUTPATIENT
Start: 2023-01-02 | End: 2023-01-04

## 2023-01-02 RX ORDER — GLYCOPYRROLATE 0.2 MG/ML
INJECTION, SOLUTION INTRAMUSCULAR; INTRAVENOUS PRN
Status: DISCONTINUED | OUTPATIENT
Start: 2023-01-02 | End: 2023-01-02

## 2023-01-02 RX ORDER — CEFAZOLIN SODIUM/WATER 2 G/20 ML
2 SYRINGE (ML) INTRAVENOUS SEE ADMIN INSTRUCTIONS
Status: DISCONTINUED | OUTPATIENT
Start: 2023-01-02 | End: 2023-01-02 | Stop reason: HOSPADM

## 2023-01-02 RX ADMIN — MIDAZOLAM 2 MG: 1 INJECTION INTRAMUSCULAR; INTRAVENOUS at 07:40

## 2023-01-02 RX ADMIN — SODIUM CHLORIDE, SODIUM GLUCONATE, SODIUM ACETATE, POTASSIUM CHLORIDE AND MAGNESIUM CHLORIDE: 526; 502; 368; 37; 30 INJECTION, SOLUTION INTRAVENOUS at 08:10

## 2023-01-02 RX ADMIN — PROPOFOL 50 MG: 10 INJECTION, EMULSION INTRAVENOUS at 17:35

## 2023-01-02 RX ADMIN — PROPOFOL 200 MG: 10 INJECTION, EMULSION INTRAVENOUS at 07:50

## 2023-01-02 RX ADMIN — REMIFENTANIL HYDROCHLORIDE 0.15 MCG/KG/MIN: 1 INJECTION, POWDER, LYOPHILIZED, FOR SOLUTION INTRAVENOUS at 08:00

## 2023-01-02 RX ADMIN — REMIFENTANIL HYDROCHLORIDE 0.15 MCG/KG/MIN: 1 INJECTION, POWDER, LYOPHILIZED, FOR SOLUTION INTRAVENOUS at 13:42

## 2023-01-02 RX ADMIN — Medication 0.5 MCG/KG/MIN: at 08:37

## 2023-01-02 RX ADMIN — SENNOSIDES AND DOCUSATE SODIUM 1 TABLET: 50; 8.6 TABLET ORAL at 21:14

## 2023-01-02 RX ADMIN — Medication 5 MG: at 08:18

## 2023-01-02 RX ADMIN — Medication 5 MG: at 09:55

## 2023-01-02 RX ADMIN — LABETALOL HYDROCHLORIDE 10 MG: 5 INJECTION, SOLUTION INTRAVENOUS at 21:02

## 2023-01-02 RX ADMIN — MANNITOL 100 ML: 20 INJECTION, SOLUTION INTRAVENOUS at 11:55

## 2023-01-02 RX ADMIN — HYDRALAZINE HYDROCHLORIDE 5 MG: 20 INJECTION INTRAMUSCULAR; INTRAVENOUS at 18:35

## 2023-01-02 RX ADMIN — REMIFENTANIL HYDROCHLORIDE 0.15 MCG/KG/MIN: 1 INJECTION, POWDER, LYOPHILIZED, FOR SOLUTION INTRAVENOUS at 09:50

## 2023-01-02 RX ADMIN — GLYCOPYRROLATE 0.2 MG: 0.2 INJECTION, SOLUTION INTRAMUSCULAR; INTRAVENOUS at 09:41

## 2023-01-02 RX ADMIN — REMIFENTANIL HYDROCHLORIDE 0.15 MCG/KG/MIN: 1 INJECTION, POWDER, LYOPHILIZED, FOR SOLUTION INTRAVENOUS at 15:55

## 2023-01-02 RX ADMIN — REMIFENTANIL HYDROCHLORIDE 0.15 MCG/KG/MIN: 1 INJECTION, POWDER, LYOPHILIZED, FOR SOLUTION INTRAVENOUS at 11:45

## 2023-01-02 RX ADMIN — SODIUM CHLORIDE, POTASSIUM CHLORIDE, SODIUM LACTATE AND CALCIUM CHLORIDE: 600; 310; 30; 20 INJECTION, SOLUTION INTRAVENOUS at 09:15

## 2023-01-02 RX ADMIN — PROPOFOL 200 MG: 10 INJECTION, EMULSION INTRAVENOUS at 08:01

## 2023-01-02 RX ADMIN — SODIUM CHLORIDE, POTASSIUM CHLORIDE, SODIUM LACTATE AND CALCIUM CHLORIDE: 600; 310; 30; 20 INJECTION, SOLUTION INTRAVENOUS at 07:37

## 2023-01-02 RX ADMIN — Medication 0.5 MCG/KG/MIN: at 08:17

## 2023-01-02 RX ADMIN — SUCCINYLCHOLINE CHLORIDE 80 MG: 20 INJECTION, SOLUTION INTRAMUSCULAR; INTRAVENOUS; PARENTERAL at 07:49

## 2023-01-02 RX ADMIN — FENTANYL CITRATE 100 MCG: 50 INJECTION, SOLUTION INTRAMUSCULAR; INTRAVENOUS at 07:46

## 2023-01-02 RX ADMIN — MANNITOL 250 ML: 20 INJECTION, SOLUTION INTRAVENOUS at 11:36

## 2023-01-02 RX ADMIN — LABETALOL HYDROCHLORIDE 40 MG: 5 INJECTION, SOLUTION INTRAVENOUS at 21:53

## 2023-01-02 RX ADMIN — LABETALOL HYDROCHLORIDE 20 MG: 5 INJECTION, SOLUTION INTRAVENOUS at 21:36

## 2023-01-02 RX ADMIN — PHENYLEPHRINE HYDROCHLORIDE 100 MCG: 10 INJECTION INTRAVENOUS at 09:02

## 2023-01-02 RX ADMIN — Medication 2 G: at 07:50

## 2023-01-02 RX ADMIN — HYDRALAZINE HYDROCHLORIDE 10 MG: 20 INJECTION INTRAMUSCULAR; INTRAVENOUS at 22:20

## 2023-01-02 RX ADMIN — PHENYLEPHRINE HYDROCHLORIDE 100 MCG: 10 INJECTION INTRAVENOUS at 09:57

## 2023-01-02 RX ADMIN — Medication 2 G: at 16:00

## 2023-01-02 RX ADMIN — IOPAMIDOL 75 ML: 755 INJECTION, SOLUTION INTRAVENOUS at 07:18

## 2023-01-02 RX ADMIN — DEXAMETHASONE SODIUM PHOSPHATE 10 MG: 4 INJECTION, SOLUTION INTRA-ARTICULAR; INTRALESIONAL; INTRAMUSCULAR; INTRAVENOUS; SOFT TISSUE at 07:49

## 2023-01-02 RX ADMIN — PROPOFOL 50 MG: 10 INJECTION, EMULSION INTRAVENOUS at 08:19

## 2023-01-02 RX ADMIN — HYDRALAZINE HYDROCHLORIDE 20 MG: 20 INJECTION INTRAMUSCULAR; INTRAVENOUS at 23:12

## 2023-01-02 RX ADMIN — ONDANSETRON 4 MG: 2 INJECTION INTRAMUSCULAR; INTRAVENOUS at 17:50

## 2023-01-02 RX ADMIN — ACETAMINOPHEN 975 MG: 325 TABLET, FILM COATED ORAL at 21:12

## 2023-01-02 RX ADMIN — Medication 2 G: at 12:00

## 2023-01-02 RX ADMIN — HYDROMORPHONE HYDROCHLORIDE 0.5 MG: 1 INJECTION, SOLUTION INTRAMUSCULAR; INTRAVENOUS; SUBCUTANEOUS at 16:21

## 2023-01-02 RX ADMIN — SODIUM CHLORIDE, PRESERVATIVE FREE: 5 INJECTION INTRAVENOUS at 21:13

## 2023-01-02 RX ADMIN — FENTANYL CITRATE 100 MCG: 50 INJECTION, SOLUTION INTRAMUSCULAR; INTRAVENOUS at 07:49

## 2023-01-02 RX ADMIN — PROPOFOL 150 MG: 10 INJECTION, EMULSION INTRAVENOUS at 08:31

## 2023-01-02 RX ADMIN — LABETALOL HYDROCHLORIDE 10 MG: 5 INJECTION, SOLUTION INTRAVENOUS at 18:10

## 2023-01-02 RX ADMIN — PHENYLEPHRINE HYDROCHLORIDE 200 MCG: 10 INJECTION INTRAVENOUS at 08:23

## 2023-01-02 RX ADMIN — GLYCOPYRROLATE 0.2 MG: 0.2 INJECTION, SOLUTION INTRAMUSCULAR; INTRAVENOUS at 08:34

## 2023-01-02 RX ADMIN — HYDRALAZINE HYDROCHLORIDE 5 MG: 20 INJECTION INTRAMUSCULAR; INTRAVENOUS at 18:53

## 2023-01-02 RX ADMIN — LIDOCAINE HYDROCHLORIDE 100 MG: 20 INJECTION, SOLUTION INFILTRATION; PERINEURAL at 07:50

## 2023-01-02 ASSESSMENT — ACTIVITIES OF DAILY LIVING (ADL)
ADLS_ACUITY_SCORE: 22

## 2023-01-02 ASSESSMENT — VISUAL ACUITY
OU: NORMAL ACUITY

## 2023-01-02 NOTE — ANESTHESIA PROCEDURE NOTES
Arterial Line Procedure Note    Pre-Procedure   Staff -        Anesthesiologist:  Berna Cote MD       Performed By: anesthesiologist       Pre-Anesthestic Checklist: patient identified, IV checked, risks and benefits discussed, informed consent, monitors and equipment checked, pre-op evaluation and at physician/surgeon's request  Timeout:       Correct Patient: Yes        Correct Procedure: Yes        Correct Site: Yes        Correct Position: Yes   Line Placement:   This line was placed Post Induction starting at 8/5/2023 8:15 AM  Procedure   Procedure: arterial line       Diagnosis: intraop monitoring       Laterality: right       Insertion Site: radial.  Sterile Prep        Standard elements of sterile barrier followed       Skin prep: Chloraprep  Insertion/Injection        Technique: ultrasound guided and Seldinger Technique        1. Ultrasound was used to evaluate the access site.       2. Artery evaluated via ultrasound for patency/adequacy.       3. Using real-time ultrasound the needle/catheter was observed entering the artery/vein.       Catheter Type/Size: 20 G, 1.75 in/4.5 cm quick cath (integral wire)  Narrative         Secured by: other       Tegaderm and Biopatch dressing used.       Complications: None apparent,        Arterial waveform: Yes        IBP within 10% of NIBP: Yes

## 2023-01-02 NOTE — ANESTHESIA PROCEDURE NOTES
Airway       Patient location during procedure: OR       Procedure Start/Stop Times: 1/2/2023 8:00 AM  Staff -        Anesthesiologist:  Berna Cote MD       CRNA: Heena Corona APRN CRNA       Other Anesthesia Staff: Rich Christine       Performed By: CRNAIndications and Patient Condition       Indications for airway management: brianna-procedural       Induction type:intravenous       Mask difficulty assessment: 2 - vent by mask + OA or adjuvant +/- NMBA    Final Airway Details       Final airway type: endotracheal airway       Successful airway: NIM  Endotracheal Airway Details        ETT size (mm): 8.0       Cuffed: yes       Cuff volume (mL): 8       Successful intubation technique: video laryngoscopy       VL Blade Size: MAC 4       Adjucts: stylet       Position: Left       Measured from: gums/teeth (At the bification of the wires)       Bite block used: Soft    Post intubation assessment        Placement verified by: capnometry, equal breath sounds and chest rise        Number of attempts at approach: 2       Secured with: silk tape       Ease of procedure: easy       Dentition: Intact and Unchanged    Medication(s) Administered   Medication Administration Time: 1/2/2023 8:00 AM    Additional Comments       SRNA tried once and CRNA put in the ETT tube.

## 2023-01-02 NOTE — ANESTHESIA PREPROCEDURE EVALUATION
Anesthesia Pre-Procedure Evaluation    Patient: Kev Chin   MRN: [id number] : 1960        Procedure : Procedure(s):  Stealth assisted right retromastoid craniotomy/craniectomy for tumor  abdominal fat graft          Past Medical History:   Diagnosis Date     BPH (benign prostatic hyperplasia)      ED (erectile dysfunction)      Heterozygous factor V Leiden mutation (H)      History of 2019 novel coronavirus disease (COVID-19)      HTN (hypertension)      Meningioma (H)      Neuroendocrine tumor of pancreas      Superior mesenteric vein thrombosis (H)       Past Surgical History:   Procedure Laterality Date     CATARACT EXTRACTION       LASER SURGERY OF EYE  2021     [facility] endoscopic ultrasound  2021     QUADRACEPS TENDON REPAIR  2016     REPR RETINAL DETACH; SCLERAL BUCKL  1994     RETINAL DETACHMENT SURGERY       RETINAL DETACHMENT SURGERY  2018      Allergies   Allergen Reactions     Quinolones      Thimerosal Unknown      Social History     Tobacco Use     Smoking status: Never     Smokeless tobacco: Never   Substance Use Topics     Alcohol use: Yes      Wt Readings from Last 1 Encounters:   22 109.3 kg (241 lb)        Anesthesia Evaluation   Pt has had prior anesthetic. Type: MAC and General.    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 Per 3/2022evaluation: no swalloning or balance issues.  #2022 MRI W & W/O contrast:   -- No significant change in enhancing right cerebellopontine angle mass measuring 2.9 x 2.6 x 2.8 cm. Persistent mass effect on the right cerebellar hemisphere. No abnormal signal in the cerebellum or brainstem. The seventh and eighth cranial nerves are normal along their course intracranially, and the labyrinthine structures are normal. No abnormal enhancement  elsewhere intracranially. There is no midline shift. The ventricles are proportionate to the  cerebral sulci. Diffusion and susceptibility weighted images are negative for acute/focal abnormality. Major intracranial vascular structures are within normal limits. No focal abnormality of the pituitary gland, sella, skull base and upper cervical spinal. Mild leukoaraiosis.  #12/17/2021 MRI:   1. 2.5 x 2.3 cm enhancing extra-axial mass within the cerebellar pontine angle on the right with local mass effect and no adjacent perilesional edema. Differential would include etiologies such as meningioma, schwannoma, metastatic lesion. Recommend any follow-up studies include dedicated images through the internal auditory canals.   2. No definite evidence of intra-axial metastatic disease.   3. Probable small vessel ischemic disease.   4. Volume loss.        Cardiovascular: Comment:   # Essential HTN. Found SBP 170s during recent clinical evals, but patient reports he checks his BP at home, with reported SBP ~135, DBP ~80s.   # Current meds: , amlodipine 5mg/d, Losartan.   # On Elliquis since 5/2022 due to SMV thrombosis - instructed to hold for sx.      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 Patient received Lovenox for ~2 weeks, and then started on Apixaban.   # Factor V leiden, heterozygous  #Labs 12/2022: Hgb 16.2, Hct 48, plt 205   (-) anemia   Musculoskeletal: Comment:   #s/p SQuadriceps tendon repair in  2016      GI/Hepatic: Comment:   Hiatal hernia, that per patient report is not giving him GERD symptoms unless he eats late at night.     (+) GERD (night time symptomatic based on if foods have been eaten late at night), Other, hiatal hernia,     Renal/Genitourinary: Comment:   # ED on  cialis. Instructed to hold it 72 hrs prior to Sx  # Most recent labs: creat 1.16. Estimated GFR 70 (12/28/22)    (+) BPH,     Endo:  - neg endo ROS     Psychiatric/Substance Use:  - neg psychiatric ROS     Infectious Disease: Comment:   - COVID-19 infection 1/2022, recovered  - Tested positive for COVID 12/2022      Malignancy: Comment:   - Hx/o SCC and melanoma.   - Neuroendocrine tumor of the head of the pancreas, diagnosed 5/2021, under surveillance. Tumor has not showed hormone secretion so far. Last scan 10/17/2022: stable.   (+) Malignancy, History of Other and Skin.Skin CA Remission status post.  Other CA neuroendocrine tumor at head of pancreas Active status post.    Other: Comment:   # Hx/o cataract sx 1993, and surgery for retinal detachment in 2003 and 2018.              OUTSIDE LABS:  CBC:   Lab Results   Component Value Date    WBC 6.5 12/28/2022    HGB 16.2 12/28/2022    HCT 48.0 12/28/2022     12/28/2022     BMP:   Lab Results   Component Value Date     12/28/2022    POTASSIUM 4.3 12/28/2022    CHLORIDE 102 12/28/2022    CO2 25 12/28/2022    BUN 20.1 12/28/2022    CR 1.16 12/28/2022    GLC 80 12/28/2022     COAGS: No results found for: PTT, INR, FIBR  POC: No results found for: BGM, HCG, HCGS  HEPATIC: No results found for: ALBUMIN, PROTTOTAL, ALT, AST, GGT, ALKPHOS, BILITOTAL, BILIDIRECT, ALICE  OTHER:   Lab Results   Component Value Date    FILOMENA 9.7 12/28/2022       Anesthesia Plan    ASA Status:  3   NPO Status:  NPO Appropriate    Anesthesia Type: General.     - Airway: ETT   Induction: Intravenous.   Maintenance: Balanced.   Techniques and Equipment:     - Lines/Monitors: 2nd IV, Arterial Line, BIS (Standard ASA monitor + Arterial Line + BIS + TwitchView)     - Blood: T&S     - Drips/Meds: Phenylephrine     Consents         - Extended Intubation/Ventilatory Support Discussed: No.      - Patient is DNR/DNI Status: No    Use of blood products discussed: No .     Postoperative  Care    Pain management: IV analgesics, Oral pain medications.   PONV prophylaxis: Ondansetron (or other 5HT-3), Dexamethasone or Solumedrol     Comments:                Berna Allen MD

## 2023-01-02 NOTE — BRIEF OP NOTE
"[facility]    Brief Operative Note    Pre-operative diagnosis: Meningioma (H) [D32.9]  Post-operative diagnosis Same as pre-operative diagnosis    Procedure: Procedure(s):  Stealth assisted right retromastoid craniotomy/craniectomy for tumor  abdominal fat graft  Surgeon: Surgeon(s) and Role:     * Quinten Mccray MD - Primary     * Damian Braga MD - Resident - Assisting     * Nabor Mansfield MD - Resident - Assisting  Anesthesia: General   Estimated Blood Loss: 50    Drains: None  Specimens:   ID Type Source Tests Collected by Time Destination   1 : right cerebellopontine angle tumor Tissue Other SURGICAL PATHOLOGY EXAM Quinten Mccray MD 1/2/2023  4:20 PM    2 : CUSA contents Tissue Other SURGICAL PATHOLOGY EXAM Quinten Mccray MD 1/2/2023  4:21 PM      Findings:   Large soft tumor stuck to CN 7,8, 9, 10, 11, 12. Transient decrement in the amplitude of wave 5 on the BAERs which quickly resolved back to baseline. .  Complications: None.  Implants:   Implant Name Type Inv. Item Serial No.  Lot No. LRB No. Used Action   GRAFT DUREPAIR DURA MATRIX 2X2\" [id number] - [id number] Bone/Tissue/Biologic GRAFT DUREPAIR DURA MATRIX 2X2\" [id number]  [employer]NEURO [id number] Right 1 Implanted   IMP BUR HOLE COVER 17MM LOW PROFILE .527 - [id number] Metallic Hardware/[place] IMP BUR HOLE COVER 17MM LOW PROFILE .527  [employer] NA Right 2 Implanted   IMP BUR HOLE COVER 24MM LOW PROFILE .528 - [id number] Metallic Hardware/[place] IMP BUR HOLE COVER 24MM LOW PROFILE .528  [employer] NA Right 1 Implanted   IMP SCR SYN MATRIX LOW PRO 1.5X04MM SELF DRILL [id number] - [id number] Metallic Hardware/[place] IMP SCR SYN MATRIX LOW PRO 1.5X04MM SELF DRILL [id number]  [employer] NA Right 11 Implanted           "

## 2023-01-03 ENCOUNTER — APPOINTMENT (OUTPATIENT)
Dept: PHYSICAL THERAPY | Facility: CLINIC | Age: 63
DRG: 026 | End: 2023-01-03
Payer: COMMERCIAL

## 2023-01-03 ENCOUNTER — APPOINTMENT (OUTPATIENT)
Dept: OCCUPATIONAL THERAPY | Facility: CLINIC | Age: 63
DRG: 026 | End: 2023-01-03
Payer: COMMERCIAL

## 2023-01-03 LAB
ANION GAP SERPL CALCULATED.3IONS-SCNC: 12 MMOL/L (ref 7–15)
BUN SERPL-MCNC: 22.2 MG/DL (ref 8–23)
CALCIUM SERPL-MCNC: 8 MG/DL (ref 8.8–10.2)
CHLORIDE SERPL-SCNC: 110 MMOL/L (ref 98–107)
CREAT SERPL-MCNC: 1.02 MG/DL (ref 0.67–1.17)
DEPRECATED HCO3 PLAS-SCNC: 20 MMOL/L (ref 22–29)
ERYTHROCYTE [DISTWIDTH] IN BLOOD BY AUTOMATED COUNT: 14.6 % (ref 10–15)
GFR SERPL CREATININE-BSD FRML MDRD: 82 ML/MIN/1.73M2
GLUCOSE BLDC GLUCOMTR-MCNC: 140 MG/DL (ref 70–99)
GLUCOSE SERPL-MCNC: 151 MG/DL (ref 70–99)
HCT VFR BLD AUTO: 40 % (ref 40–53)
HGB BLD-MCNC: 13 G/DL (ref 13.3–17.7)
MAGNESIUM SERPL-MCNC: 1.7 MG/DL (ref 1.7–2.3)
MCH RBC QN AUTO: 27.9 PG (ref 26.5–33)
MCHC RBC AUTO-ENTMCNC: 32.5 G/DL (ref 31.5–36.5)
MCV RBC AUTO: 86 FL (ref 78–100)
PHOSPHATE SERPL-MCNC: 2.3 MG/DL (ref 2.5–4.5)
PLATELET # BLD AUTO: 148 10E3/UL (ref 150–450)
POTASSIUM SERPL-SCNC: 3.9 MMOL/L (ref 3.4–5.3)
RBC # BLD AUTO: 4.66 10E6/UL (ref 4.4–5.9)
SODIUM SERPL-SCNC: 142 MMOL/L (ref 136–145)
WBC # BLD AUTO: 15.9 10E3/UL (ref 4–11)

## 2023-01-03 PROCEDURE — 84100 ASSAY OF PHOSPHORUS: CPT

## 2023-01-03 PROCEDURE — 97530 THERAPEUTIC ACTIVITIES: CPT | Mod: GO

## 2023-01-03 PROCEDURE — 97162 PT EVAL MOD COMPLEX 30 MIN: CPT | Mod: GP

## 2023-01-03 PROCEDURE — 250N000011 HC RX IP 250 OP 636: Performed by: STUDENT IN AN ORGANIZED HEALTH CARE EDUCATION/TRAINING PROGRAM

## 2023-01-03 PROCEDURE — 97530 THERAPEUTIC ACTIVITIES: CPT | Mod: GP

## 2023-01-03 PROCEDURE — 999N000128 HC STATISTIC PERIPHERAL IV START W/O US GUIDANCE

## 2023-01-03 PROCEDURE — 250N000013 HC RX MED GY IP 250 OP 250 PS 637: Performed by: STUDENT IN AN ORGANIZED HEALTH CARE EDUCATION/TRAINING PROGRAM

## 2023-01-03 PROCEDURE — 97165 OT EVAL LOW COMPLEX 30 MIN: CPT | Mod: GO

## 2023-01-03 PROCEDURE — 250N000013 HC RX MED GY IP 250 OP 250 PS 637

## 2023-01-03 PROCEDURE — 85027 COMPLETE CBC AUTOMATED: CPT

## 2023-01-03 PROCEDURE — 250N000011 HC RX IP 250 OP 636: Performed by: NEUROLOGICAL SURGERY

## 2023-01-03 PROCEDURE — 99222 1ST HOSP IP/OBS MODERATE 55: CPT | Mod: GC | Performed by: PSYCHIATRY & NEUROLOGY

## 2023-01-03 PROCEDURE — 250N000009 HC RX 250: Performed by: NEUROLOGICAL SURGERY

## 2023-01-03 PROCEDURE — 200N000002 HC R&B ICU UMMC

## 2023-01-03 PROCEDURE — 999N000015 HC STATISTIC ARTERIAL MONITORING DAILY

## 2023-01-03 PROCEDURE — 258N000003 HC RX IP 258 OP 636: Performed by: NEUROLOGICAL SURGERY

## 2023-01-03 PROCEDURE — 83735 ASSAY OF MAGNESIUM: CPT

## 2023-01-03 PROCEDURE — 80048 BASIC METABOLIC PNL TOTAL CA: CPT

## 2023-01-03 PROCEDURE — 250N000011 HC RX IP 250 OP 636

## 2023-01-03 RX ORDER — LOSARTAN POTASSIUM 50 MG/1
50 TABLET ORAL DAILY
Status: DISCONTINUED | OUTPATIENT
Start: 2023-01-03 | End: 2023-01-06 | Stop reason: HOSPADM

## 2023-01-03 RX ORDER — METHOCARBAMOL 500 MG/1
500 TABLET, FILM COATED ORAL 4 TIMES DAILY
Status: DISCONTINUED | OUTPATIENT
Start: 2023-01-03 | End: 2023-01-03

## 2023-01-03 RX ORDER — METHOCARBAMOL 750 MG/1
750 TABLET, FILM COATED ORAL 4 TIMES DAILY
Status: DISCONTINUED | OUTPATIENT
Start: 2023-01-03 | End: 2023-01-06 | Stop reason: HOSPADM

## 2023-01-03 RX ORDER — HEPARIN SODIUM 5000 [USP'U]/.5ML
5000 INJECTION, SOLUTION INTRAVENOUS; SUBCUTANEOUS EVERY 8 HOURS
Status: DISCONTINUED | OUTPATIENT
Start: 2023-01-03 | End: 2023-01-06 | Stop reason: ALTCHOICE

## 2023-01-03 RX ORDER — AMLODIPINE BESYLATE 5 MG/1
5 TABLET ORAL DAILY
Status: DISCONTINUED | OUTPATIENT
Start: 2023-01-03 | End: 2023-01-06 | Stop reason: HOSPADM

## 2023-01-03 RX ORDER — MAGNESIUM SULFATE HEPTAHYDRATE 40 MG/ML
2 INJECTION, SOLUTION INTRAVENOUS ONCE
Status: COMPLETED | OUTPATIENT
Start: 2023-01-03 | End: 2023-01-03

## 2023-01-03 RX ADMIN — LABETALOL HYDROCHLORIDE 10 MG: 5 INJECTION, SOLUTION INTRAVENOUS at 04:15

## 2023-01-03 RX ADMIN — METHOCARBAMOL 500 MG: 500 TABLET ORAL at 12:06

## 2023-01-03 RX ADMIN — LOSARTAN POTASSIUM 50 MG: 50 TABLET, FILM COATED ORAL at 18:09

## 2023-01-03 RX ADMIN — HYDROMORPHONE HYDROCHLORIDE 0.4 MG: 0.2 INJECTION, SOLUTION INTRAMUSCULAR; INTRAVENOUS; SUBCUTANEOUS at 18:09

## 2023-01-03 RX ADMIN — DEXAMETHASONE 5 MG: 1.5 TABLET ORAL at 08:05

## 2023-01-03 RX ADMIN — AMLODIPINE BESYLATE 5 MG: 5 TABLET ORAL at 08:06

## 2023-01-03 RX ADMIN — OXYCODONE HYDROCHLORIDE 10 MG: 10 TABLET ORAL at 08:05

## 2023-01-03 RX ADMIN — SENNOSIDES AND DOCUSATE SODIUM 1 TABLET: 50; 8.6 TABLET ORAL at 20:18

## 2023-01-03 RX ADMIN — POLYETHYLENE GLYCOL 3350 17 G: 17 POWDER, FOR SOLUTION ORAL at 08:06

## 2023-01-03 RX ADMIN — METHOCARBAMOL 500 MG: 500 TABLET ORAL at 15:47

## 2023-01-03 RX ADMIN — HEPARIN SODIUM 5000 UNITS: 5000 INJECTION, SOLUTION INTRAVENOUS; SUBCUTANEOUS at 22:15

## 2023-01-03 RX ADMIN — ACETAMINOPHEN 975 MG: 325 TABLET, FILM COATED ORAL at 12:06

## 2023-01-03 RX ADMIN — ACETAMINOPHEN 975 MG: 325 TABLET, FILM COATED ORAL at 04:09

## 2023-01-03 RX ADMIN — LABETALOL HYDROCHLORIDE 10 MG: 5 INJECTION, SOLUTION INTRAVENOUS at 01:16

## 2023-01-03 RX ADMIN — HYDRALAZINE HYDROCHLORIDE 20 MG: 20 INJECTION INTRAMUSCULAR; INTRAVENOUS at 08:06

## 2023-01-03 RX ADMIN — HYDROMORPHONE HYDROCHLORIDE 0.4 MG: 0.2 INJECTION, SOLUTION INTRAMUSCULAR; INTRAVENOUS; SUBCUTANEOUS at 09:16

## 2023-01-03 RX ADMIN — MAGNESIUM SULFATE IN WATER 2 G: 40 INJECTION, SOLUTION INTRAVENOUS at 05:48

## 2023-01-03 RX ADMIN — HYDROMORPHONE HYDROCHLORIDE 0.4 MG: 0.2 INJECTION, SOLUTION INTRAMUSCULAR; INTRAVENOUS; SUBCUTANEOUS at 13:06

## 2023-01-03 RX ADMIN — HYDROMORPHONE HYDROCHLORIDE 0.2 MG: 0.2 INJECTION, SOLUTION INTRAMUSCULAR; INTRAVENOUS; SUBCUTANEOUS at 05:48

## 2023-01-03 RX ADMIN — HYDRALAZINE HYDROCHLORIDE 20 MG: 20 INJECTION INTRAMUSCULAR; INTRAVENOUS at 22:15

## 2023-01-03 RX ADMIN — HYDROMORPHONE HYDROCHLORIDE 0.2 MG: 0.2 INJECTION, SOLUTION INTRAMUSCULAR; INTRAVENOUS; SUBCUTANEOUS at 22:15

## 2023-01-03 RX ADMIN — HYDROMORPHONE HYDROCHLORIDE 0.4 MG: 0.2 INJECTION, SOLUTION INTRAMUSCULAR; INTRAVENOUS; SUBCUTANEOUS at 15:47

## 2023-01-03 RX ADMIN — SENNOSIDES AND DOCUSATE SODIUM 1 TABLET: 50; 8.6 TABLET ORAL at 08:05

## 2023-01-03 RX ADMIN — LABETALOL HYDROCHLORIDE 20 MG: 5 INJECTION, SOLUTION INTRAVENOUS at 10:06

## 2023-01-03 RX ADMIN — HYDROMORPHONE HYDROCHLORIDE 0.2 MG: 0.2 INJECTION, SOLUTION INTRAMUSCULAR; INTRAVENOUS; SUBCUTANEOUS at 03:14

## 2023-01-03 RX ADMIN — METHOCARBAMOL 750 MG: 750 TABLET, FILM COATED ORAL at 20:19

## 2023-01-03 RX ADMIN — HYDRALAZINE HYDROCHLORIDE 10 MG: 20 INJECTION INTRAMUSCULAR; INTRAVENOUS at 05:00

## 2023-01-03 RX ADMIN — OXYCODONE HYDROCHLORIDE 5 MG: 5 TABLET ORAL at 00:19

## 2023-01-03 RX ADMIN — LABETALOL HYDROCHLORIDE 20 MG: 5 INJECTION, SOLUTION INTRAVENOUS at 19:05

## 2023-01-03 RX ADMIN — METHOCARBAMOL 500 MG: 500 TABLET ORAL at 09:15

## 2023-01-03 RX ADMIN — ACETAMINOPHEN 975 MG: 325 TABLET, FILM COATED ORAL at 20:18

## 2023-01-03 RX ADMIN — HYDRALAZINE HYDROCHLORIDE 20 MG: 20 INJECTION INTRAMUSCULAR; INTRAVENOUS at 20:18

## 2023-01-03 RX ADMIN — POTASSIUM PHOSPHATE, MONOBASIC POTASSIUM PHOSPHATE, DIBASIC 15 MMOL: 224; 236 INJECTION, SOLUTION, CONCENTRATE INTRAVENOUS at 05:55

## 2023-01-03 RX ADMIN — HYDROMORPHONE HYDROCHLORIDE 0.2 MG: 0.2 INJECTION, SOLUTION INTRAMUSCULAR; INTRAVENOUS; SUBCUTANEOUS at 20:19

## 2023-01-03 RX ADMIN — HYDROMORPHONE HYDROCHLORIDE 0.4 MG: 0.2 INJECTION, SOLUTION INTRAMUSCULAR; INTRAVENOUS; SUBCUTANEOUS at 11:09

## 2023-01-03 ASSESSMENT — VISUAL ACUITY
OU: NORMAL ACUITY

## 2023-01-03 ASSESSMENT — ACTIVITIES OF DAILY LIVING (ADL)
ADLS_ACUITY_SCORE: 32
ADLS_ACUITY_SCORE: 30
ADLS_ACUITY_SCORE: 30
ADLS_ACUITY_SCORE: 32

## 2023-01-03 NOTE — PROGRESS NOTES
Admitted/transferred from: PACU at 2030.   Reason for admission/transfer: Care post craniotomy.   2 RN skin assessment: completed by Self and Hans PROCTOR   Result of skin assessment and interventions/actions:   Old/healed incision on L knee, small pea-sized abrasion on R knee, pin site on the back of the head has minimal drainage and hematoma (MD aware), all dressings intact with minimal drainage.   Height, weight, drug calc weight: Done.   Patient belongings (see Flowsheet): Two patient belonging bags with clothes inside. Bags were put in the closet.   MDRO education added to care plan: No

## 2023-01-03 NOTE — PROGRESS NOTES
"[facility], [employer]  Neurosurgery Progress Note:  01/03/2023      Interval History: s/p OR for R retrosig craniotomy for resection of meningioma with abd fat graft; admitted to ICU. Post op CT head with expected post operative changes and pneumocephalus.    Assessment:  Mr. Chin is a 62-year-old right-handed male with hx of Factor V Leiden on Eliquis who was diagnosed with pancreatic neuroendocrine tumor in 2021 with dotatate scan demonstrating active lesion in the R cerebellomedullary angle.     Now POD 1 s/p R retrosig craniotomy with abdominal fat graft.     Clinically Significant Risk Factors                        # Obesity: Estimated body mass index is 30.1 kg/m  as calculated from the following:    Height as of this encounter: 1.93 m (6' 4\").    Weight as of this encounter: 112.2 kg (247 lb 4.8 oz)., PRESENT ON ADMISSION            Plan:  Serial neuro exams  Decadron 5 day taper- ordered  Will discuss prophylactic SQH and eventual anticoagulation  No further imaging  No Keppra  Resume PTA HTN meds gradually   Pain control  HOB > 30 degrees  Advance diet as tolerated  Bowel regimen  PRN antiemetics  PT/OT  SCDs for DVT proph    -----------------------------------  Juan Grimes MD  Neurosurgery resident, PGY-2  -----------------------------------    Gen: Appears uncomfortable, endorses pain  Wound: clean, dry, intact scalp and abdominal wounds  Neurologic:  Alert & Oriented to person, place, time, and situation  Follows commands briskly  Speech fluent, spontaneous. No aphasia or dysarthria. Mild hoarseness, improving overnight compared to post op  No gaze preference. No apparent hemineglect.  PERRL, EOMI  Face symmetric with sensation intact to light touch  Palate elevates symmetrically, uvula midline, tongue protrudes midline  Trapezii muscles 5/5 bilaterally  No pronator drift     Del Tr Bi WE WF Gr   R 5 5 5 5 5 5   L 5 5 5 5 5 5    HF KE KF DF PF EHL   R 5 5 5 5 5 5   L 5 5 " 5 5 5 5     Reflexes 2+ throughout    Sensation intact and symmetric to light touch throughout    Objective:   Temp:  [97.7  F (36.5  C)-99.1  F (37.3  C)] 98.3  F (36.8  C)  Pulse:  [] 86  Resp:  [12-30] 23  BP: (126-152)/() 126/69  MAP:  [78 mmHg-300 mmHg] 93 mmHg  Arterial Line BP: (130-183)/() 148/67  SpO2:  [91 %-99 %] 92 %  I/O last 3 completed shifts:  In: 4038.75 [P.O.:480; I.V.:3558.75]  Out: 1805 [Urine:1805]        LABS:  Recent Labs   Lab 01/03/23 0427 01/02/23 2044 01/02/23 0634 12/28/22  1544     --   --  140   POTASSIUM 3.9  --   --  4.3   CHLORIDE 110*  --   --  102   CO2 20*  --   --  25   ANIONGAP 12  --   --  13   * 217* 115* 80   BUN 22.2  --   --  20.1   CR 1.02  --   --  1.16   FILOMENA 8.0*  --   --  9.7       Recent Labs   Lab 01/03/23 0427   WBC 15.9*   RBC 4.66   HGB 13.0*   HCT 40.0   MCV 86   MCH 27.9   MCHC 32.5   RDW 14.6   *       IMAGING:  Recent Results (from the past 24 hour(s))   CT Head w/o Contrast    Narrative    CT HEAD W/O CONTRAST 1/2/2023 7:30 AM    History: right cerebellomedullary angle tumor; Meningioma (H)   ICD-10: Meningioma (H)    Comparison: 8/3/2022 MRI brain    Technique: Using multidetector thin collimation helical acquisition  technique, axial, coronal and sagittal CT images from the skull base  to the vertex were obtained without intravenous contrast.   (topogram) image(s) also obtained and reviewed.    Findings:   No significant change in hyperdense cerebellopontine mass measuring  3.0 x 2.7 x 2.5 cm (Transverse, AP, craniocaudal) (series 2, image 58)  (series 7, image 50). Persistent mass effect to the right cerebellar  hemisphere.    There is no intracranial hemorrhage, mass effect, or midline shift.  Gray/white matter differentiation in both cerebral hemispheres is  preserved. Ventricles are proportionate to the cerebral sulci. The  basal cisterns are clear. Mild patchy periventricular hypoattenuation,  compatible  with chronic small vessel ischemic disease.    The bony calvaria and the bones of the skull base are normal. The  visualized portions of the paranasal sinuses and mastoid air cells are  clear. Scleral buckle on the right.      Impression    Impression:  1. No significant change in presumed right cerebellopontine angle  meningioma compared to 8/3/2022 MR brain.   2. Mild leukoaraiosis.    I have personally reviewed the examination and initial interpretation  and I agree with the findings.    ANNA PEACOCK MD         SYSTEM ID:  [id number]   CTA Head Neck w Contrast    Narrative    CTA HEAD NECK W CONTRAST 1/2/2023 7:30 AM    CT angiogram of the neck   CT angiogram of the base of the brain with contrast  Reconstruction on the 3D workstation    Provided History:  right cerebellomedullary angle tumor; Meningioma  (H)  ICD-10: Meningioma (H)    Comparison:  8/30/2022 MRI.      Technique:  HEAD and NECK CTA: During rapid bolus intravenous injection of  nonionic contrast material, axial images were obtained using thin  collimation multidetector helical technique from the base of the upper  aortic arch through the [place] of [place]. This CT angiogram data was  reconstructed at thin intervals with mild overlap. Images were sent to  the 3D workstation, and 3D reconstructions were obtained. The axial  source images, multiplanar reformations, 3D reconstructions in both  maximum intensity projection display and volume rendered models were  reviewed, with reconstructions performed by the technologist.    Contrast: iopamidol (ISOVUE-370) solution 75 mL    Findings:    Head CTA demonstrates no aneurysm or stenosis of the major  intracranial arteries. Fetal origin of the right posterior cerebral  artery.    Neck CTA demonstrates no stenosis of the major cervical arteries. The  origins of the great vessels from the aortic arch are patent. The  normal distal right internal carotid artery measures 5 mm. The normal  distal left internal  carotid artery measures 5 mm. Mild calcified  plaque at the carotid bulbs with no significant stenosis.    Revisualization of the right cerebellopontine angle mass is 2.9 x 2.0  cm similar to prior. There is mass effect on the right cerebellar  hemisphere. Right scleral buckle. Minimal mucosal thickening of the  right maxillary sinus. Mastoid air cells are clear.    No mass within the visualized portions of the cervical soft tissues or  lung apices. No suspicious osseous lesions. Mild degenerative changes  of the cervical spine.      Impression    Impression:    1. Head CTA demonstrates no aneurysm or stenosis of the major  intracranial arteries.   2. Neck CTA demonstrates no stenosis of the major cervical arteries.   3. Revisualization of the right CPA presumed meningioma.    I have personally reviewed the examination and initial interpretation  and I agree with the findings.    ANNA PEACOCK MD         SYSTEM ID:  [id number]   CT Head w/o Contrast    Narrative    CT HEAD W/O CONTRAST 1/2/2023 8:28 PM    History: s/p meningioma resection, please evaluate for hemorrhage     Comparison: Same day head CT    Technique: Using multidetector thin collimation helical acquisition  technique, axial, coronal and sagittal CT images from the skull base  to the vertex were obtained without intravenous contrast.    Findings:  New post surgical changes of right retromastoid craniotomy and right  cerebellopontine angle mass meningioma resection. New scattered  pneumocephalus, presumed postsurgical. 1.5 x 0.5 cm hyperattenuating  focus along the posterior medial aspect of the resection cavity,  likely representing a small amount of postoperative blood products  (series 3 image 9). Mild periventricular white matter hypodensities,  likely sequela of chronic small vessel ischemic disease. No acute  gray-white differentiation loss. Right base significant. No  hydrocephalus. The ventricles are proportionate to the cerebral sulci.  No  acute calvarial fractures. The mastoid air cells are clear. Tiny  mucosal retention cyst in the right maxillary sinus, otherwise the  visualized paranasal sinuses are clear. Atherosclerotic calcifications  of the vertebral arteries.      Impression    Impression:  1. New postsurgical changes of right retromastoid craniotomy and right  cerebellopontine angle mass resection with a small focus of  postoperative blood products along the posteromedial aspect of the  resection cavity.  2. Postsurgical pneumocephalus.    I have personally reviewed the examination and initial interpretation  and I agree with the findings.    FELISA JAIMES MD         SYSTEM ID:  [id number]

## 2023-01-03 NOTE — PLAN OF CARE
Cardiac: Sinus Rhythm. BP stable. Systolic <140. Art-line removed. Prn labetalol and hydralazine utilized. Afebrile.  Neuro: A&Ox4. Q1hr vitals. Pupils equal reactive. Strength equal bilaterally. Denies N/T. Makes needs known. Ax1/sba wals into bathroom.  Respiratory: 2L nc for comfort. Sats 90% on RA. LS clear.  GI: Regular diet. Good appetite. No bm. Bowel program in place.  : Ribera removed. Due to void.  Skin: Craniotomy site R-temporal lobe. Fat graft on abdomin. Intact other than surgical sites.    Sig Events: Q1hr neuro's continued. Walked into bathroom. Pain management continued.     Manoj Matta RN SICU Neuro-ICU

## 2023-01-03 NOTE — PROGRESS NOTES
Spoke with patient about CPAP. Patient stated he does not wear one at home.     Dayton Martinez, RT

## 2023-01-03 NOTE — ANESTHESIA POSTPROCEDURE EVALUATION
Patient: Kev Chin    Procedure: Procedure(s):  Stealth assisted right retromastoid craniotomy/craniectomy for tumor  abdominal fat graft       Anesthesia Type:  General    Note:  Disposition: ICU            ICU Sign Out: Anesthesiologist/ICU physician sign out WAS performed (Sign out given to Dr. Smith)   Postop Pain Control: Uneventful            Sign Out: Well controlled pain   PONV: No   Neuro/Psych: Uneventful            Sign Out: Other neuro status (tremor of b/l arms and feet)   Airway/Respiratory: Uneventful            Sign Out: Acceptable/Baseline resp. status   CV/Hemodynamics: Uneventful            Sign Out: Acceptable CV status; No obvious hypovolemia; No obvious fluid overload   Other NRE: NONE   DID A NON-ROUTINE EVENT OCCUR? No           Last vitals:  Vitals Value Taken Time   /84 01/02/23 1915   Temp 36.5  C (97.7  F) 01/02/23 1915   Pulse 98 01/02/23 1931   Resp 21 01/02/23 1931   SpO2 94 % 01/02/23 1931   Vitals shown include unvalidated device data.    Electronically Signed By: GAUDENCIO FRASER MD  January 2, 2023  7:32 PM

## 2023-01-03 NOTE — OR NURSING
Provider at bedside to assess patient. Thinks redness on forehead is tracking from Pin site to mid forehead pin site

## 2023-01-03 NOTE — OP NOTE
[facility]  DEPARTMENT OF NEUROSURGERY  OPERATIVE REPORT    PROCEDURE DATE: 01/02/2023    PREOPERATIVE DIAGNOSIS:  Growing right cerebellomedullary angle meningioma.    POSTOPERATIVE DIAGNOSIS:  Growing right cerebellomedullary angle meningioma.    PROCEDURE PERFORMED:     1.  Right retromastoid craniotomy for resection of meningioma.  2.  Stereotactic neuronavigation.  3.  Use of operative microscope.  4.  Abdominal fat graft.  5.  Neuromonitoring for right cranial nerve VII, IX, X and BAERs    ASSISTANT:  Quinten Mccray MD    ASSISTANTS:     1.  Hank Mansfield M.D.  2.  Damian Braga M.D.    ANESTHESIA:  General.    ESTIMATED BLOOD LOSS:  50 mL    INDICATIONS FOR PROCEDURE:  Mr. Chin is a 62-year-old right-handed male who was diagnosed with pancreatic neuroendocrine tumor in 2021.  His workup included a dotatate scan that demonstrated an active lesion in the cerebellomedullary angle for which he was referred.  We initially observed the tumor, but it was found to be growing.  We met, discussed options for management including further continued observation, radiation, and surgical resection.  We discussed the risks and benefits of each approach.  He agreed to proceed with surgery.  Informed consent was obtained.    PROCEDURE IN DETAIL:  After informed consent was obtained, the patient was brought to the operating room and placed supine on the operating room table.  The anesthesia service performed an intubation and established intravenous and arterial access.  We requested that 10 mg of dexamethasone, 0.5 grams per kilogram of mannitol, and intravenous antibiotics be administered.  The bed was turned 180 degrees.  The patient was kept supine with 2 flat blankets as a shoulder bump.  The head was placed in a Campuzano head arango and turned towards the contralateral side approximately 70 degrees, keeping the neck comfortably turned with only mild flexion.  The right retroauricular area was marked for  a skin incision in the shape of C approximately 3 fingerbreadths behind the ear.  Stereotactic neuronavigation was registered and its accuracy was confirmed.  We confirmed that the incision would incorporate the transverse sigmoid junction and allow for wide access to the posterior fossa.  The [employer] team then placed leads to monitor BAERs bilaterally as well as right cranial nerve VII, IX, and X by use of a monitoring endotracheal tube placed by anesthesia.  These were connected to the [facility].  The right retroauricular area and left abdomen were sterilely prepped and draped in the usual fashion.  A timeout was performed to confirm details of the procedure.    After a timeout was performed, we then initiated the opening.  The retroauricular C incision was opened and a myocutaneous flap was reflected ventrally.  The suboccipital region and mastoid were exposed.  We used navigation to confirm our landmarks.  We then created a craniotomy by placing a bur hole at the transverse sigmoid junction and turning a bone flap using the craniotome.  We then used a 5 mm round and 3 mm matchstick cutter to reach the transverse sinus proper, and we followed this to the transverse sigmoid junction.  A large emissary vein was encountered and it was controlled using a small muscle patch.  We then exposed the lower sigmoid sinus down into the inferolateral corner.  We therefore had wide access for our opening into the posterior fossa.  We obtained hemostasis with Gelfoam slurry and bipolar cautery.  We noted that the dura was very thin in several areas.    The brain appeared nicely relaxed after mannitol, so we then opened the dura.  We opened the dura in stellate fashion and tacked the edges up using Nurolon sutures.  We then brought in the operating microscope.  We released CSF from below the tumor in the cerebellomedullary angle.  The tumor was very evident along the petrous ridge.  We then coagulated the capsule to debulk the tumor.   The tumor was quite soft and most of it could be suctioned from the inside.  We used a combination of the ultrasonic aspirator, microscissors, and suction to internally debulk the tumor.  The tumor had a healthy blood supply, which required multiple rounds of coagulation as we went.  We first focused on the inferior pole to liberate the lower cranial nerves to avoid traction.  We emptied the inferior pole by internal debulking, and then we rolled around the inferior pole capsule edge.  We identified cranial nerve XI, X complex as well as the cranial nerve XI inferior to the tumor.  We were able to liberate the rootlets from the lower pole.  We confirmed stimulation was 0.05 milliamps.  We then focused on the medial pole and the superior pole of the tumor.  We were able to debulk this portion and pull the medial margin in.  There was a medium size artery embedded within the tumor capsule that appeared en passage.  We meticulously liberated the artery from the cleft along the tumor capsule and followed this superiorly.  We finally were able to liberate the entire artery, which we confirmed to be an en passage artery after following its entire course.  We liberated its distal end from the region of the IAC.  We then emptied the superior pole and folded in the tumor margin along the superior pole.  In this location we identified the cranial nerve VII-VIII complex.  We confirmed brisk stimulation of the facial nerve at 0.05 milliamps.  We confirmed the BAERs did not change during this point.  We then carefully folded in the superior pole of the tumor and identified the internal auditory canal.  We continue to fold in the tumor capsule until we reached the jugular foramen.  We noted that the tumor capsule was extremely adherent to the lower cranial nerves IX and X.  We meticulously and carefully debulked the center of the tumor to avoid traction on the tumor capsule, and then coagulated and divided small portions of  tumor capsule to converge on the jugular foramen.  We then followed the petrous ridge and remove the tumor capsule off the petrous ridge until we reached the jugular foramen from a lateral to medial approach.  Once we had reached the jugular foramen from both sides, we then again stimulated the lower cranial nerves.  Cranial nerve XI stimulated briskly by apparent motion on the ipsilateral shoulder.  The lower cranial nerves however, only stimulated at 0.1 milliamps.  We detected vagus activity during the procedure today, however, the glossopharyngeal nerve activity could not be detected in neither before or after tumor resection.  The lower cranial nerves were clearly distorted in their anatomy given the mass effect from the tumor, so I could not be confident that we had properly identified cranial nerve IX.  We identified the hypoglossal nerve coursing over the vertebral artery and this was not in contact with the tumor.  This nerve appeared intact.  We therefore whittled down as much of the tumor emanating from the jugular foramen as possible.  We did not want to risk the lower cranial nerves further, so we decided to stop our dissection.  The remnant of the tumor was extremely adherent to the lower cranial nerves and entered the jugular foramen.  We then copiously irrigated and used bipolar cautery to obtain hemostasis.  Hemostasis appeared excellent.  The brain and brainstem appeared nicely relaxed without the mass effect from the tumor.    We therefore decided to turn to closure.  We rescrubbed in and harvested an abdominal fat graft.  The left abdomen was exposed.  A skin incision was made.  An abdominal fat graft was harvested with monopolar cautery.  The wound was copiously irrigated.  All bleeding points were controlled.  The wound was closed in layers using 2-0 Vicryl sutures in the deep layer, and 3-0 Vicryl sutures in the deep dermal layer.  The skin was closed with a subcuticular 4-0 Monocryl stitch.   The wound was sterilely dressed with Steri-Strips and a Primapore dressing.    We then rescrubbed in to the cranial portion of the procedure.  The dura was closed with a dura-repair graft in a watertight fashion.  Again, the dura was extremely thin at baseline.  We then rewaxed the air cells, which were very prominent.  We rewaxed them again.  We then performed a Valsalva maneuver to confirm watertight dural closure.  The fat graft was then laid along the mastoid air cells and along our dural closure.  The bone flap was then replaced with the Synthes plating system using large bur hole covers.  The wound was copiously irrigated.  The muscle was closed with interrupted Vicryl sutures.  The fascia and galea were then closed with interrupted Vicryl sutures.  The skin was then closed with a 3-0 running nylon stitch.  The wound was sterilely dressed.    The sterile drapes were removed.  The patient's head was released from the Campuzano head arango and the head of bed was replaced.  The patient was then returned to the anesthesia service for extubation.  The patient was then brought to the postoperative care unit for monitoring in route to the ICU for care overnight.  Upon conclusion of the procedure, I called the patient's significant other for an update.    I was scrubbed and present through the critical portions of the procedure, and I remained immediately available throughout.    Quinten Mccray MD        D: 2023   T: 2023   MT: SPMT    Name:     OKSANA MANUEL  MRN:      [id number]        Account:        [id number]   :      1960           Procedure Date: 2023     Document: [id number]

## 2023-01-03 NOTE — PROGRESS NOTES
01/03/23 1117   Appointment Info   Signing Clinician's Name / Credentials (PT) Miriam Galan, PT, DPT   Living Environment   People in Home significant other   Current Living Arrangements apartment   Home Accessibility no concerns   Transportation Anticipated family or friend will provide;car, drives self   Living Environment Comments Pt lives in apartment with fiance, no JUD   Self-Care   Usual Activity Tolerance good   Current Activity Tolerance poor   Equipment Currently Used at Home none   Activity/Exercise/Self-Care Comment IND at baseline, works as chiropractor   General Information   Onset of Illness/Injury or Date of Surgery 01/02/23   Referring Physician Jraeth Alvarez MD   Patient/Family Therapy Goals Statement (PT) not specifically stated   Pertinent History of Current Problem (include personal factors and/or comorbidities that impact the POC) Per chart: Mr. Chin is a 62-year-old right-handed male with hx of Factor V Leiden on Eliquis who was diagnosed with pancreatic neuroendocrine tumor in 2021 with dotatate scan demonstrating active lesion in the R cerebellomedullary angle.   Existing Precautions/Restrictions   (crani)   Cognition   Affect/Mental Status (Cognition) WFL   Follows Commands (Cognition) WFL   Pain Assessment   Patient Currently in Pain Yes, see Vital Sign flowsheet   Integumentary/Edema   Integumentary/Edema Comments surgical incision R retrosig crani   Posture    Posture   (lateral cervical tilt, antalgic)   Range of Motion (ROM)   Range of Motion ROM deficits secondary to surgical procedure   Strength (Manual Muscle Testing)   Strength Comments grossly WFL, crani precautions   Bed Mobility   Comment, (Bed Mobility) min A supine to sit   Transfers   Comment, (Transfers) deferred due to increasing BP, anticipating Ax1 STS   Gait/Stairs (Locomotion)   Comment, (Gait/Stairs) did not assess   Balance   Balance Comments CGA unsupported sitting   Clinical Impression   Criteria for  Skilled Therapeutic Intervention Yes, treatment indicated   PT Diagnosis (PT) impaired functional mobility   Influenced by the following impairments acutely post op, pain, crani precautions, impaired activity tolerance, impaired balance   Functional limitations due to impairments bed mobility, transfers, gait   Clinical Presentation (PT Evaluation Complexity) Evolving/Changing   Clinical Presentation Rationale acutely post op; >3 body structure and functional impairments   Clinical Decision Making (Complexity) moderate complexity   Planned Therapy Interventions (PT) balance training;bed mobility training;gait training;home exercise program;neuromuscular re-education;stair training;strengthening;transfer training   Risk & Benefits of therapy have been explained evaluation/treatment results reviewed;care plan/treatment goals reviewed;risks/benefits reviewed;participants included;patient;spouse/significant other   PT Total Evaluation Time   PT Eval, Moderate Complexity Minutes (53641) 7   Plan of Care Review   Plan of Care Reviewed With patient;significant other   Physical Therapy Goals   PT Frequency 6x/week   PT Predicted Duration/Target Date for Goal Attainment 01/27/23   PT Goals Bed Mobility;Transfers;Gait;Stairs   PT: Bed Mobility Independent;Supine to/from sit;Within precautions   PT: Transfers Independent;Sit to/from stand   PT: Gait Independent;Greater than 200 feet   Interventions   Interventions Quick Adds Therapeutic Activity   Therapeutic Activity   Therapeutic Activities: dynamic activities to improve functional performance Minutes (50763) 17   Symptoms Noted During/After Treatment Dizziness;Fatigue;Increased pain   Treatment Detail/Skilled Intervention Pt greeted in bed, agreeable to session, fiance present. Co treat with OT as per RN pt with limited tolerance for activity due to pain. Following eval, pt maintained sitting EOB with CGA and BUE support progressing to no UE support and SBA, ~8 min. Pt  "reporting lightheadedness and excessive \"pressure in his head\", BPs initially stable systolic in 160s, but increasing to 170s with prolonged sitting. Returned to supine with min Ax2 due to elevated BP, BP lowering quickly with return to supine. Pt repositioned with total Ax2. Encouraged pt to sit EOB or trial standing later in day. Pt left in bed with all needs in reach, alfonso present, RN updated.   PT Discharge Planning   PT Plan STS and pre gait, amb as able   PT Discharge Recommendation (DC Rec) home with assist   PT Rationale for DC Rec Pt mobility currently limited by pain, anticipating pt will progress to appropriate for home dc with assist as needed.   PT Brief overview of current status Ax1 bed mobility, anticipating Ax1 STS   Total Session Time   Timed Code Treatment Minutes 17   Total Session Time (sum of timed and untimed services) 24     "

## 2023-01-03 NOTE — PROGRESS NOTES
01/03/23 1208   Appointment Info   Signing Clinician's Name / Credentials (OT) SEA Hester   Living Environment   People in Home significant other   Current Living Arrangements apartment   Home Accessibility no concerns   Transportation Anticipated car, drives self;family or friend will provide   Living Environment Comments pt lives in an apartment with his SO.   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 Pt SO can A at discharge as needed.   Instrumental Activities of Daily Living (IADL)   Previous Responsibilities finances;medication management;driving;work   IADL Comments SO can A   General Information   Onset of Illness/Injury or Date of Surgery 01/02/23   Referring Physician Jareth Alvarez MD   Patient/Family Therapy Goal Statement (OT) To go home   Additional Occupational Profile Info/Pertinent History of Current Problem p OR for R retrosig craniotomy for resection of meningioma with abd fat graft; admitted to ICU. Post op CT head with expected post operative changes and pneumocephalus.   Existing Precautions/Restrictions other (see comments)  (craniotomy)   Left Upper Extremity (Weight-bearing Status) full weight-bearing (FWB)   Right Upper Extremity (Weight-bearing Status) full weight-bearing (FWB)   Left Lower Extremity (Weight-bearing Status) full weight-bearing (FWB)   Right Lower Extremity (Weight-bearing Status) full weight-bearing (FWB)   Cognitive Status Examination   Orientation Status orientation to person, place and time   Affect/Mental Status (Cognitive) anxious   Follows Commands WNL   Cognitive Status Comments Appears intact, will cont to monitor   Visual Perception   Visual Impairment/Limitations WNL   Sensory   Sensory  Quick Adds sensation intact   Posture   Posture forward head position   Range of Motion Comprehensive   General Range of Motion no range of motion deficits identified   Comment, General Range of Motion pt with c/o pain/stiffness in R-shoulder near surgical site)   Strength Comprehensive (MMT)   Comment, General Manual Muscle Testing (MMT) Assessment WFL   Coordination   Upper Extremity Coordination No deficits were identified   Bed Mobility   Bed Mobility supine-sit   Supine-Sit [place] (Bed Mobility) minimum assist (75% patient effort)   Transfers   Transfer Comments Did not attempt due to pt BP elevated, suspect Ax1   Balance   Balance Comments Good seated balance   Activities of Daily Living   BADL Assessment/Intervention bathing;lower body dressing;toileting   Bathing Assessment/Intervention   [place] Level (Bathing) minimum assist (75% patient effort)   Comment, (Bathing) per clinical judgement   Lower Body Dressing Assessment/Training   Comment, (Lower Body Dressing) per clinical judgement   [place] Level (Lower Body Dressing) minimum assist (75% patient effort)   Toileting   Comment, (Toileting) per clinical judgement   [place] Level (Toileting) minimum assist (75% patient effort)   Clinical Impression   Criteria for Skilled Therapeutic Interventions Met (OT) Yes, treatment indicated   OT Diagnosis Pt is below baseline for ADLs   OT Problem List-Impairments impacting ADL problems related to;activity tolerance impaired;post-surgical precautions;pain   Assessment of Occupational Performance 3-5 Performance Deficits   Identified Performance Deficits dressing, bathing, toileting, home management, work   Planned Therapy Interventions (OT) ADL retraining;IADL retraining;progressive activity/exercise;transfer training   Clinical Decision Making Complexity (OT) low complexity   Risk & Benefits of therapy have been explained evaluation/treatment results reviewed;care plan/treatment goals  reviewed;risks/benefits reviewed;current/potential barriers reviewed;participants voiced agreement with care plan;participants included;patient;spouse/significant other   Clinical Impression Comments Pt presents below baseline, limited by pain and post-surgical precautions. Pt will benefit from skilled OT to progress toward PLOF   OT Total Evaluation Time   OT Eval, Low Complexity Minutes (53862) 7   OT Goals   Therapy Frequency (OT) 6 times/wk   OT Predicted Duration/Target Date for Goal Attainment 01/06/23   OT Goals Lower Body Dressing;Lower Body Bathing;Transfers;Toilet Transfer/Toileting   OT: Lower Body Dressing Independent;within precautions   OT: Lower Body Bathing Independent;with precautions   OT: Transfer within precautions;Independent  (tub/shower)   OT: Toilet Transfer/Toileting Independent;toilet transfer;cleaning and garment management;within precautions   Interventions   Interventions Quick Adds Therapeutic Activity   Therapeutic Activities   Therapeutic Activity Minutes (77282) 17   Symptoms noted during/after treatment fatigue   Treatment Detail/Skilled Intervention Co-treat with PT as RN reporting pt with significant pain, limited tolerance for activity. Facilitated EOB sitting to progress pt activity tolerance for ADLs. Pt educated on craniotomy precautions, with pt expressing understanding. pt supine > sit EOB with min A, second person managing lines. Pt noting dizziness and increased pain/pressure in head with positional change. Pt tolerating approx 8-10 mins of EOB siting, however reporting no relief from symptoms and Th noting BP elevated with positional change to 160s-170s systolic. Th determining to conclude session at this point. pt sit > supine with min A. Pt boosted in bed with Ax2. Educated pt on trial of OOB activity with RN in PM, pt agreeable. Pt focused throughout session on neck pain/stiffness, education provided on gentle ROM.   OT Discharge Planning   OT Plan OT: standing ADLs,  review precautions (needs handout), LB dressing   OT Discharge Recommendation (DC Rec) home with assist   OT Rationale for DC Rec Pt limited by pain and elevated BP this date therefore unable to progress OOB activity. However pt mobilizing well from supine > sit, anticipate pt will progress to be safe to d/c home when medically ready with PRN A for heavy ADLs.   OT Brief overview of current status min A - CGA for bed mobility   Total Session Time   Timed Code Treatment Minutes 17   Total Session Time (sum of timed and untimed services) 24

## 2023-01-03 NOTE — PLAN OF CARE
Major Shift Events:  AOX4. R beat nystagmus. 5/5 strength. No tremors. C/o headache from frontal to temporal lobe, managed with pain medication. RA. SR 80-90 with no ectopy. SBP goal <140, managed with labetalol and hydralazine. No BM overnight. Passed the Nursing dysphagia screen. Pt had clear liquids overnight. Ribera put out good output.     Hematoma around one of the pin sites had minimal drainage.     Replaced Mag and Phos this AM.     Asymptomatic COVID swab was negative.     Plan: q1h neuro checks and continue to monitor and alter POC accordingly.   For vital signs and complete assessments, please see documentation flowsheets.

## 2023-01-03 NOTE — ANESTHESIA CARE TRANSFER NOTE
Patient: Kev Chin    Procedure: Procedure(s):  Stealth assisted right retromastoid craniotomy/craniectomy for tumor  abdominal fat graft       Diagnosis: Meningioma (H) [D32.9]  Diagnosis Additional Information: No value filed.    Anesthesia Type:   General     Note:    Oropharynx: oropharynx clear of all foreign objects and spontaneously breathing  Level of Consciousness: drowsy  Oxygen Supplementation: face mask  Level of Supplemental Oxygen (L/min / FiO2): 10  Independent Airway: airway patency satisfactory and stable  Dentition: dentition unchanged  Vital Signs Stable: post-procedure vital signs reviewed and stable  Report to RN Given: handoff report given  Patient transferred to: PACU  Comments: SBP > 140, PACU RN to treat with PRN labetalol. Red rash/hematoma noted on forehead, surgeon aware and at bedside to assess. RN to continue to monitor.   Handoff Report: Identifed the Patient, Identified the Reponsible Provider, Reviewed the pertinent medical history, Discussed the surgical course, Reviewed Intra-OP anesthesia mangement and issues during anesthesia, Set expectations for post-procedure period and Allowed opportunity for questions and acknowledgement of understanding      Vitals:  Vitals Value Taken Time   /85 01/02/23 1800   Temp 38.1C    Pulse 115 01/02/23 1806   Resp 29 01/02/23 1806   SpO2 94 % 01/02/23 1806   Vitals shown include unvalidated device data.    Electronically Signed By: CAMPBELL Sanchez CRNA  January 2, 2023  6:07 PM

## 2023-01-03 NOTE — H&P
Neurocritical Care History & Physical    Reason for critical care admission: s/p resection cerebellomedullary angle meningioma   Admitting Team: YESSY  Date of Service:  01/03/2023  Date of Admission:  1/2/2023  Hospital Day: 2    Assessment/Plan  Mr. Chin is a 62-year-old man with factor V Leyden (previous on eliquis daily), a history of of SMV thrombosis, pancreatic neuroendocrine tumor and meningioma at the cerebellomedullary angle found on 12/16/2021, admitted on 1/2/2023 for planned Stealth assisted right retromastoid craniotomy/craniectomy resection.      Neuro  #s/p right cerebellomedullary angle meningioma craniotomy for resection, POD #1  -Neurochecks every 1 hour first 24 hours post-operatively   -SBP goal < 140 mmHg  -HOB > 30   -PT/OT  -Dexamethasone taper    #Analgesics & sedation  -Tylenol 975 mg q8hr for pain control   -Additionally has: Tylenol 650 mg q4 PRN, hydromorphone 0.4 q2 PRN, oxycodone 5mg (used once overnight) and 10 mg PRN q4hr    CV  #h/o HTN  -Cardiac monitoring  -currently holding  losartan 50 mg and amlodipine 5 mg, to consider restarting in the AM  -SBP goal < 140 mmHg  -PRN Labetalol and Hydralazine, has required multiple doses, total of 5    Resp  #Intermittent desaturations postoperatively   Oxygen/vent:  -NC PRN, not requiring any longer, previously on 3 LPM, now 0  -Continuous pulse ox  -Maintain O2 saturations greater than 92%    GI  # h/o hiatal hernia  Diet:advance as tolerated  Last BM: PTA  GI prophylaxis: Not indicated   -Bowel regimen: scheduled senna-docusate and Miralax    Renal/  # h/o BPH  -Daily BMP  (most recent as of 12/28)  -IV fluids:  75 ml/hr of normal saline  -Electrolyte replacement protocol    Endo  # pancreatic neuroendocrine tumor, surveillance currently (follows with Dr. Hein)  -Monitor glucose levels    Heme  # heterozygous  factor V Leyden (previous on eliquis daily),   # history of of SMV thrombosis  -PTA on Apixaban 5 mg BID, restart per NRSY  "preference (typically ~1week)  -Daily CBC (most recent as of 12/28)  -Hgb goal >7, plt goal >50k  -Transfuse to meet Hgb and plt goals    ID  # [id number]  -Daily CBC  -Follow temperature curve    ICU Checklist  Lines/tubes/drains: PIV x2, A line, Ribera,   FEN: Advance diet as tolerated post-operatively  PPX: DVT - SCDs : GI - not indicated  Code: 75 ml/hr of normal saline  Dispo: ICU - [facility]            History of Present Illness:     Mr. Chin is a 62-year-old man with a past medical history significant for hypertension, melanoma of the left shoulder, basal cell cancer,  heterozygous for factor V Leyden (on eliquis daily), history of SMV thrombosis, neuroendocrine tumor at the head of the pancreas under surveillance, hiatal hernia, BPH, erectile dysfunction and meningioma at the cerebellomedullary angle found on (12/16/2021) now s/p planned Stealth assisted right retromastoid craniotomy/craniectomy resection with EBL 50 documented.     Upon meeting Kev this evening, he denied any pain or headache, he further denied weakness, numbness or dizziness. 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 He is very concerned about chlorhexidine and perseverates on this, and states \"it can not be there\" \"it must not be there,\" when asked further he states he has an allergy to it but can not describe the consequences directly, and then on repeat he states he gets short of breath.       Allergies   Allergen Reactions     Quinolones      Thimerosal Unknown       PAST MEDICAL HISTORY:   Past Medical History:   Diagnosis Date     BPH (benign prostatic hyperplasia)      ED (erectile dysfunction)      Heterozygous factor V Leiden mutation (H)      History of 2019 novel coronavirus disease (COVID-19)      HTN (hypertension)      Meningioma (H)      Neuroendocrine tumor of pancreas      Superior mesenteric vein thrombosis (H)        PAST SURGICAL HISTORY:   Past Surgical History:   Procedure Laterality Date     CATARACT EXTRACTION  " "1993     LASER SURGERY OF EYE  09/2021     [facility] endoscopic ultrasound  06/2021     QUADRACEPS TENDON REPAIR  2016     REPR RETINAL DETACH; SCLERAL BUCKL  1994     RETINAL DETACHMENT SURGERY  2003     RETINAL DETACHMENT SURGERY  2018       FAMILY HISTORY:         SOCIAL HISTORY:   Social History     Tobacco Use     Smoking status: Never     Smokeless tobacco: Never   Substance Use Topics     Alcohol use: Yes   He is from [place], and is a chiropractor with his  own practice since the 90's.   He is very active: hobbies include  tennis, paddle board, yoga.   He is a light drinker.   He has never been a smoker.           ROS: The 10 point Review of Systems is negative other than noted in the HPI or here.     Current Medications:    acetaminophen  975 mg Oral Q8H     [Held by provider] amLODIPine  5 mg Oral Daily     dexamethasone  5 mg Oral Daily    Followed by     [START ON 1/4/2023] dexamethasone  4 mg Oral Daily    Followed by     [START ON 1/5/2023] dexamethasone  3 mg Oral Daily    Followed by     [START ON 1/6/2023] dexamethasone  2 mg Oral Daily    Followed by     [START ON 1/7/2023] dexamethasone  1 mg Oral Daily     [Held by provider] losartan  50 mg Oral Daily     polyethylene glycol  17 g Oral Daily     senna-docusate  1 tablet Oral BID     sodium chloride (PF)  3 mL Intracatheter Q8H       PRN Medications:  [START ON 1/5/2023] acetaminophen, bisacodyl, calcium carbonate, hydrALAZINE, HYDROmorphone **OR** HYDROmorphone, labetalol, lidocaine 4%, lidocaine (buffered or not buffered), magnesium hydroxide, naloxone **OR** naloxone **OR** naloxone **OR** naloxone, ondansetron **OR** ondansetron, oxyCODONE **OR** oxyCODONE, prochlorperazine **OR** prochlorperazine, sodium chloride (PF)    Infusions:    sodium chloride 75 mL/hr at 01/03/23 0000       Physical Examination:  Vitals: /69 (BP Location: Left arm)   Pulse 87   Temp 98.2  F (36.8  C) (Oral)   Resp 22   Ht 1.93 m (6' 4\")   Wt 112.2 kg (247 lb 4.8 oz)  " " SpO2 93%   BMI 30.10 kg/m    General: Adult male patient, lying in bed, critically-ill, oxy mask on face  HEENT: Normocephalic, bandages over scalp on right side that were c/d/i, no icterus, oral cavity/oropharynx pink and moist  Pulm:  unlabored, no retractions or use of accessory muscles  Abdomen: Soft, non-distended  Extremities: Warm, no edema, well perfused  Skin: No rash or lesion  Psych: Calm and cooperative  Neuro:  Mental status: Awake, alert, attentive, oriented to self, time, place, and circumstance-states \"menigioma\". Able to calculate 1.50$ and follow commands. Language is fluent and coherent with intact comprehension of complex commands, naming and repetition. He perseverates somewhat on \"chlorhexidine\" stating \"it can't be in there\"  Cranial nerves: PERRL at 3 mm, conjugate gaze, EOMI with right horizontal gaze nystagmus, facial sensation intact, face symmetric, shoulder shrug strong, tongue midline, no dysarthria.   Motor: Normal bulk and tone. No abnormal movements. Antigravity in all 4 extremities  Sensory: Intact to light touch x 4 extremities  Coordination: FNF without ataxia or dysmetria    NIHSS  Interval     1a. Level of Consciousness 0-->Alert, keenly responsive   1b. LOC Questions 0-->Answers both questions correctly   1c. LOC Commands 0-->Performs both tasks correctly   2.   Best Gaze 0-->Normal   3.   Visual 0-->No visual loss   4.   Facial Palsy 0-->Normal symmetrical movements   5a. Motor Arm, Left 0-->No drift, limb holds 90 (or 45) degrees for full 10 secs   5b. Motor Arm, Right 0-->No drift, limb holds 90 (or 45) degrees for full 10 secs   6a. Motor Leg, Left 0-->No drift, leg holds 30 degree position for full 5 secs   6b. Motor Leg, right 0-->No drift, leg holds 30 degree position for full 5 secs   7.   Limb Ataxia 0-->Absent   8.   Sensory 0-->Normal, no sensory loss   9.   Best Language 0-->No aphasia, normal   10. Dysarthria 0-->Normal   11. Extinction and Inattention  0-->No " abnormality   Total 0 (01/03/23 0000)     Labs:  Recent Labs   Lab 12/28/22  1544      POTASSIUM 4.3   CHLORIDE 102   CO2 25   BUN 20.1   CR 1.16   FILOMENA 9.7       Recent Labs   Lab 12/28/22  1544   WBC 6.5   HGB 16.2          No results for input(s): PH, PCO2, PO2, HCO3 in the last 168 hours.    All cultures:  No results for input(s): CULTURE in the last 168 hours.    Imaging:  All relevant imaging and laboratory values personally reviewed.

## 2023-01-04 ENCOUNTER — APPOINTMENT (OUTPATIENT)
Dept: OCCUPATIONAL THERAPY | Facility: CLINIC | Age: 63
DRG: 026 | End: 2023-01-04
Attending: NEUROLOGICAL SURGERY
Payer: COMMERCIAL

## 2023-01-04 ENCOUNTER — APPOINTMENT (OUTPATIENT)
Dept: PHYSICAL THERAPY | Facility: CLINIC | Age: 63
DRG: 026 | End: 2023-01-04
Attending: NEUROLOGICAL SURGERY
Payer: COMMERCIAL

## 2023-01-04 LAB
ANION GAP SERPL CALCULATED.3IONS-SCNC: 10 MMOL/L (ref 7–15)
BUN SERPL-MCNC: 15.4 MG/DL (ref 8–23)
CALCIUM SERPL-MCNC: 8.3 MG/DL (ref 8.8–10.2)
CHLORIDE SERPL-SCNC: 105 MMOL/L (ref 98–107)
CREAT SERPL-MCNC: 0.78 MG/DL (ref 0.67–1.17)
DEPRECATED HCO3 PLAS-SCNC: 22 MMOL/L (ref 22–29)
ERYTHROCYTE [DISTWIDTH] IN BLOOD BY AUTOMATED COUNT: 14.8 % (ref 10–15)
GFR SERPL CREATININE-BSD FRML MDRD: >90 ML/MIN/1.73M2
GLUCOSE SERPL-MCNC: 138 MG/DL (ref 70–99)
HCT VFR BLD AUTO: 41.3 % (ref 40–53)
HGB BLD-MCNC: 13.2 G/DL (ref 13.3–17.7)
MAGNESIUM SERPL-MCNC: 1.9 MG/DL (ref 1.7–2.3)
MCH RBC QN AUTO: 28.4 PG (ref 26.5–33)
MCHC RBC AUTO-ENTMCNC: 32 G/DL (ref 31.5–36.5)
MCV RBC AUTO: 89 FL (ref 78–100)
PHOSPHATE SERPL-MCNC: 2.4 MG/DL (ref 2.5–4.5)
PLATELET # BLD AUTO: 146 10E3/UL (ref 150–450)
POTASSIUM SERPL-SCNC: 4.2 MMOL/L (ref 3.4–5.3)
RBC # BLD AUTO: 4.64 10E6/UL (ref 4.4–5.9)
SODIUM SERPL-SCNC: 137 MMOL/L (ref 136–145)
WBC # BLD AUTO: 13.1 10E3/UL (ref 4–11)

## 2023-01-04 PROCEDURE — 97530 THERAPEUTIC ACTIVITIES: CPT | Mod: GP

## 2023-01-04 PROCEDURE — 85027 COMPLETE CBC AUTOMATED: CPT

## 2023-01-04 PROCEDURE — 999N000197 HC STATISTIC WOC PT EDUCATION, 0-15 MIN

## 2023-01-04 PROCEDURE — 97530 THERAPEUTIC ACTIVITIES: CPT | Mod: GO

## 2023-01-04 PROCEDURE — 250N000009 HC RX 250: Performed by: NEUROLOGICAL SURGERY

## 2023-01-04 PROCEDURE — 258N000003 HC RX IP 258 OP 636: Performed by: NEUROLOGICAL SURGERY

## 2023-01-04 PROCEDURE — 97535 SELF CARE MNGMENT TRAINING: CPT | Mod: GO

## 2023-01-04 PROCEDURE — 36415 COLL VENOUS BLD VENIPUNCTURE: CPT

## 2023-01-04 PROCEDURE — 250N000011 HC RX IP 250 OP 636: Performed by: STUDENT IN AN ORGANIZED HEALTH CARE EDUCATION/TRAINING PROGRAM

## 2023-01-04 PROCEDURE — 83735 ASSAY OF MAGNESIUM: CPT

## 2023-01-04 PROCEDURE — 200N000002 HC R&B ICU UMMC

## 2023-01-04 PROCEDURE — 250N000013 HC RX MED GY IP 250 OP 250 PS 637: Performed by: NEUROLOGICAL SURGERY

## 2023-01-04 PROCEDURE — 250N000013 HC RX MED GY IP 250 OP 250 PS 637

## 2023-01-04 PROCEDURE — 97116 GAIT TRAINING THERAPY: CPT | Mod: GP

## 2023-01-04 PROCEDURE — 80048 BASIC METABOLIC PNL TOTAL CA: CPT

## 2023-01-04 PROCEDURE — 84100 ASSAY OF PHOSPHORUS: CPT

## 2023-01-04 PROCEDURE — 250N000013 HC RX MED GY IP 250 OP 250 PS 637: Performed by: STUDENT IN AN ORGANIZED HEALTH CARE EDUCATION/TRAINING PROGRAM

## 2023-01-04 PROCEDURE — 250N000011 HC RX IP 250 OP 636

## 2023-01-04 RX ORDER — HYDROMORPHONE HCL IN WATER/PF 6 MG/30 ML
0.2 PATIENT CONTROLLED ANALGESIA SYRINGE INTRAVENOUS EVERY 4 HOURS PRN
Status: DISCONTINUED | OUTPATIENT
Start: 2023-01-04 | End: 2023-01-06 | Stop reason: HOSPADM

## 2023-01-04 RX ORDER — HYDROMORPHONE HYDROCHLORIDE 2 MG/1
2 TABLET ORAL EVERY 4 HOURS PRN
Status: DISCONTINUED | OUTPATIENT
Start: 2023-01-04 | End: 2023-01-04

## 2023-01-04 RX ORDER — HYDROMORPHONE HYDROCHLORIDE 2 MG/1
2-4 TABLET ORAL
Status: DISCONTINUED | OUTPATIENT
Start: 2023-01-04 | End: 2023-01-06 | Stop reason: HOSPADM

## 2023-01-04 RX ORDER — MAGNESIUM OXIDE 400 MG/1
400 TABLET ORAL EVERY 4 HOURS
Status: COMPLETED | OUTPATIENT
Start: 2023-01-04 | End: 2023-01-04

## 2023-01-04 RX ADMIN — HEPARIN SODIUM 5000 UNITS: 5000 INJECTION, SOLUTION INTRAVENOUS; SUBCUTANEOUS at 13:55

## 2023-01-04 RX ADMIN — HYDROMORPHONE HYDROCHLORIDE 0.2 MG: 0.2 INJECTION, SOLUTION INTRAMUSCULAR; INTRAVENOUS; SUBCUTANEOUS at 13:55

## 2023-01-04 RX ADMIN — HYDROMORPHONE HYDROCHLORIDE 0.4 MG: 0.2 INJECTION, SOLUTION INTRAMUSCULAR; INTRAVENOUS; SUBCUTANEOUS at 04:26

## 2023-01-04 RX ADMIN — LABETALOL HYDROCHLORIDE 10 MG: 5 INJECTION, SOLUTION INTRAVENOUS at 20:24

## 2023-01-04 RX ADMIN — HYDROMORPHONE HYDROCHLORIDE 0.2 MG: 0.2 INJECTION, SOLUTION INTRAMUSCULAR; INTRAVENOUS; SUBCUTANEOUS at 00:03

## 2023-01-04 RX ADMIN — ACETAMINOPHEN 975 MG: 325 TABLET, FILM COATED ORAL at 12:12

## 2023-01-04 RX ADMIN — METHOCARBAMOL 750 MG: 750 TABLET, FILM COATED ORAL at 12:12

## 2023-01-04 RX ADMIN — HEPARIN SODIUM 5000 UNITS: 5000 INJECTION, SOLUTION INTRAVENOUS; SUBCUTANEOUS at 21:21

## 2023-01-04 RX ADMIN — POTASSIUM PHOSPHATE, MONOBASIC POTASSIUM PHOSPHATE, DIBASIC 15 MMOL: 224; 236 INJECTION, SOLUTION, CONCENTRATE INTRAVENOUS at 07:58

## 2023-01-04 RX ADMIN — Medication 400 MG: at 12:12

## 2023-01-04 RX ADMIN — METHOCARBAMOL 750 MG: 750 TABLET, FILM COATED ORAL at 19:37

## 2023-01-04 RX ADMIN — LABETALOL HYDROCHLORIDE 20 MG: 5 INJECTION, SOLUTION INTRAVENOUS at 21:23

## 2023-01-04 RX ADMIN — SENNOSIDES AND DOCUSATE SODIUM 1 TABLET: 50; 8.6 TABLET ORAL at 19:38

## 2023-01-04 RX ADMIN — ACETAMINOPHEN 975 MG: 325 TABLET, FILM COATED ORAL at 04:26

## 2023-01-04 RX ADMIN — LABETALOL HYDROCHLORIDE 20 MG: 5 INJECTION, SOLUTION INTRAVENOUS at 16:04

## 2023-01-04 RX ADMIN — HYDROMORPHONE HYDROCHLORIDE 0.4 MG: 0.2 INJECTION, SOLUTION INTRAMUSCULAR; INTRAVENOUS; SUBCUTANEOUS at 06:03

## 2023-01-04 RX ADMIN — OXYCODONE HYDROCHLORIDE 10 MG: 10 TABLET ORAL at 07:58

## 2023-01-04 RX ADMIN — LABETALOL HYDROCHLORIDE 20 MG: 5 INJECTION, SOLUTION INTRAVENOUS at 13:55

## 2023-01-04 RX ADMIN — OXYCODONE HYDROCHLORIDE 10 MG: 10 TABLET ORAL at 12:12

## 2023-01-04 RX ADMIN — HYDRALAZINE HYDROCHLORIDE 20 MG: 20 INJECTION INTRAMUSCULAR; INTRAVENOUS at 10:06

## 2023-01-04 RX ADMIN — HYDROMORPHONE HYDROCHLORIDE 2 MG: 2 TABLET ORAL at 19:49

## 2023-01-04 RX ADMIN — METHOCARBAMOL 750 MG: 750 TABLET, FILM COATED ORAL at 16:04

## 2023-01-04 RX ADMIN — LABETALOL HYDROCHLORIDE 20 MG: 5 INJECTION, SOLUTION INTRAVENOUS at 04:26

## 2023-01-04 RX ADMIN — LABETALOL HYDROCHLORIDE 10 MG: 5 INJECTION, SOLUTION INTRAVENOUS at 19:49

## 2023-01-04 RX ADMIN — LABETALOL HYDROCHLORIDE 20 MG: 5 INJECTION, SOLUTION INTRAVENOUS at 00:03

## 2023-01-04 RX ADMIN — LOSARTAN POTASSIUM 50 MG: 50 TABLET, FILM COATED ORAL at 07:57

## 2023-01-04 RX ADMIN — DEXAMETHASONE 4 MG: 4 TABLET ORAL at 07:58

## 2023-01-04 RX ADMIN — AMLODIPINE BESYLATE 5 MG: 5 TABLET ORAL at 07:57

## 2023-01-04 RX ADMIN — HYDROMORPHONE HYDROCHLORIDE 0.4 MG: 0.2 INJECTION, SOLUTION INTRAMUSCULAR; INTRAVENOUS; SUBCUTANEOUS at 02:05

## 2023-01-04 RX ADMIN — ACETAMINOPHEN 975 MG: 325 TABLET, FILM COATED ORAL at 19:37

## 2023-01-04 RX ADMIN — CALCIUM CARBONATE (ANTACID) CHEW TAB 500 MG 1000 MG: 500 CHEW TAB at 20:22

## 2023-01-04 RX ADMIN — HEPARIN SODIUM 5000 UNITS: 5000 INJECTION, SOLUTION INTRAVENOUS; SUBCUTANEOUS at 06:03

## 2023-01-04 RX ADMIN — SENNOSIDES AND DOCUSATE SODIUM 1 TABLET: 50; 8.6 TABLET ORAL at 07:57

## 2023-01-04 RX ADMIN — METHOCARBAMOL 750 MG: 750 TABLET, FILM COATED ORAL at 07:57

## 2023-01-04 RX ADMIN — Medication 400 MG: at 07:57

## 2023-01-04 RX ADMIN — HYDROMORPHONE HYDROCHLORIDE 2 MG: 2 TABLET ORAL at 16:04

## 2023-01-04 ASSESSMENT — ACTIVITIES OF DAILY LIVING (ADL)
ADLS_ACUITY_SCORE: 30

## 2023-01-04 ASSESSMENT — VISUAL ACUITY
OU: NORMAL ACUITY

## 2023-01-04 NOTE — PLAN OF CARE
Major Shift Events: Neuro remains unchanged. AOX4 with q2h neuro. Call light appropriate and anxious at times. SR 70-90 with rare PVC. SBP goal <140, labetalol and hydralazine given per orders. 2L NC. 1 small BM around 0400. Good output via urinal at bedside. No changes to skin.     Neurosurgery assessed pt at bedside and removed dressing over the craniotomy incision around 0400.     Mag and Phos replacement protocols started per orders.    Plan: q2h neuro and continue to monitor and alter POC accordingly.   For vital signs and complete assessments, please see documentation flowsheets.      Goal Outcome Evaluation:      Plan of Care Reviewed With: patient    Overall Patient Progress: improvingOverall Patient Progress: improving    Outcome Evaluation: No changes to neuro exam and reports decreased pain with current regimen.

## 2023-01-04 NOTE — PLAN OF CARE
Cardiac: Sinus Rhythm. BP stable. Systolic <140. Prn labetalol and hydralazine utilized. Afebrile.  Neuro: A&Ox4. Q4hr neuro's. Pupils equal reactive. Strength equal bilaterally. Denies N/T. Makes needs known. sba walks into bathroom.  Respiratory: 2L nc for comfort. Sats 90% on RA. LS clear.  GI: Regular diet. Good appetite. No bm. Bowel program in place.  : voiding spontaneously. Voids in urinal and toilet.  Skin: Craniotomy site R-temporal lobe. Fat graft on abdomin. Intact other than surgical sites.     Sig Events: Pain management program changed. Transfer orders to  placed. Walked in halls with therapy.      Manoj Matta RN SICU Neuro-ICU

## 2023-01-04 NOTE — PROGRESS NOTES
"[facility], [place]  Neurosurgery Progress Note:  01/04/2023      Interval History: PTA hypertension meds resumed. Arterial line discontinued. SQH started. Patient's exam remains stable.     Assessment:  Mr. Chin is a 62-year-old right-handed male with hx of Factor V Leiden on Eliquis who was diagnosed with pancreatic neuroendocrine tumor in 2021 with dotatate scan demonstrating active lesion in the R cerebellomedullary angle.     Now POD 2 s/p R retrosig craniotomy with abdominal fat graft.     Clinically Significant Risk Factors                        # Obesity: Estimated body mass index is 30.1 kg/m  as calculated from the following:    Height as of this encounter: 1.93 m (6' 4\").    Weight as of this encounter: 112.2 kg (247 lb 4.8 oz)., PRESENT ON ADMISSION            Plan:  Serial neuro exams  Decadron 5 day taper  CT Head in AM (1/5)  Plan to transition to oral anticoagulant tomorrow after head CT   Continue PTA losartan and amlodipine  Pain control  HOB > 30 degrees  Regular adult diet  Bowel regimen  PRN antiemetics  PT/OT  SCDs and SQH for DVT prophylaxis  Transfer to floor  Likely discharge home tomorrow, 1/5    -----------------------------------  Juan Grimes MD  Neurosurgery resident, PGY-2  -----------------------------------    Gen: Appears comfortable  Wound: clean, dry, intact scalp and abdominal wounds  Neurologic:  Alert & Oriented to person, place, time, and situation  Follows commands briskly  Speech fluent, spontaneous. No aphasia or dysarthria. Mild hoarseness, improving overnight compared to post op  No gaze preference. No apparent hemineglect.  PERRL, EOMI  Face symmetric with sensation intact to light touch  Palate elevates symmetrically, uvula midline, tongue protrudes midline  Trapezii muscles 5/5 bilaterally  No pronator drift     Del Tr Bi WE WF Gr   R 5 5 5 5 5 5   L 5 5 5 5 5 5    HF KE KF DF PF EHL   R 5 5 5 5 5 5   L 5 5 5 5 5 5     Reflexes 2+ " throughout    Sensation intact and symmetric to light touch throughout    Objective:   Temp:  [97.9  F (36.6  C)-98.6  F (37  C)] 98.4  F (36.9  C)  Pulse:  [73-97] 80  Resp:  [14-23] 17  BP: (126-159)/(76-93) 136/87  MAP:  [78 mmHg-107 mmHg] 101 mmHg  Arterial Line BP: (130-154)/(63-84) 143/78  SpO2:  [90 %-96 %] 94 %  I/O last 3 completed shifts:  In: 3155 [P.O.:2220; I.V.:935]  Out: 1780 [Urine:1780]        LABS:  Recent Labs   Lab 01/03/23  0643 01/03/23 0427 01/02/23 2044 01/02/23 0634 12/28/22  1544   NA  --  142  --   --  140   POTASSIUM  --  3.9  --   --  4.3   CHLORIDE  --  110*  --   --  102   CO2  --  20*  --   --  25   ANIONGAP  --  12  --   --  13   * 151* 217*   < > 80   BUN  --  22.2  --   --  20.1   CR  --  1.02  --   --  1.16   FILOMENA  --  8.0*  --   --  9.7    < > = values in this interval not displayed.       Recent Labs   Lab 01/03/23 0427   WBC 15.9*   RBC 4.66   HGB 13.0*   HCT 40.0   MCV 86   MCH 27.9   MCHC 32.5   RDW 14.6   *       IMAGING:  No results found for this or any previous visit (from the past 24 hour(s)).

## 2023-01-04 NOTE — PROGRESS NOTES
Neurocritical Care Multi-Disciplinary Note    Reason for critical care admission: s/p resection cerebellomedullary angle meningioma   Admitting Team: neurosurgery  Date of Service:  01/04/2023  Date of Admission:  1/2/2023  Hospital Day: 3    Patient condition reviewed and discussed while on multidisciplinary rounds today.   Please note these minor interventions that were initiated:  1. None    The Neurocritical Care service will continue to follow peripherally while the patient is within the ICU. We are readily available should issues arise. Please feel free to contact us for critical care issues with which we may be of assistance. For all other concerns, please contact primary service first.     Please contact NCC team phone at [phone]    CAMPBELL Ortiz, CNP  Neurocritical Care  *[phone]  Text Page

## 2023-01-04 NOTE — PHARMACY-ADMISSION MEDICATION HISTORY
Admission Medication History Completed by Pharmacy    See [facility] Admission Navigator for allergy information, preferred outpatient pharmacy, prior to admission medications and immunization status.     Medication History Sources:     Medication history performed 12/20/2022    Patient     External prescription fill history     Changes made to PTA medication list (reason):    Added: None    Deleted: None    Changed: None    Additional Information:    Patient denies any changes to prior to admission medications since 12/20/2022 interview.     Prior to Admission medications    Medication Sig Last Dose Taking? Auth Provider Long Term End Date   acetaminophen (TYLENOL) 500 MG tablet Take 1,000 mg by mouth every 8 hours as needed  at PRN Yes Reported, Patient     amLODIPine (NORVASC) 5 MG tablet Take 1 tablet by mouth daily 1/2/2023 at 0400 Yes Reported, Patient Yes    apixaban ANTICOAGULANT (ELIQUIS) 5 MG tablet Take 5 mg by mouth 2 times daily 12/29/2022 Yes Reported, Patient     losartan (COZAAR) 50 MG tablet Take 50 mg by mouth daily 1/1/2023 Yes Reported, Patient Yes    tadalafil (CIALIS) 20 MG tablet TAKE 1 TABLET BY MOUTH 30 TO 60 MINUTES BEFORE INTERCOURSE AS NEEDED. TAKE ONLY ONCE DAILY  Patient not taking: Reported on 12/19/2022 12/19/2022  Reported, Patient Yes        Date completed: 01/04/23    Medication history completed by: JAYNE LEMOS RPH

## 2023-01-04 NOTE — CONSULTS
Received consult for R) arm blister, spoke with RN and noted small blister. Staff RN able to manage small blistered areas utilizing unit routine care or standard protocols. No WOC consult warranted. Updated MD and RN, WOC will sign off.

## 2023-01-05 ENCOUNTER — APPOINTMENT (OUTPATIENT)
Dept: SPEECH THERAPY | Facility: CLINIC | Age: 63
DRG: 026 | End: 2023-01-05
Attending: NURSE PRACTITIONER
Payer: COMMERCIAL

## 2023-01-05 ENCOUNTER — APPOINTMENT (OUTPATIENT)
Dept: CT IMAGING | Facility: CLINIC | Age: 63
DRG: 026 | End: 2023-01-05
Payer: COMMERCIAL

## 2023-01-05 ENCOUNTER — APPOINTMENT (OUTPATIENT)
Dept: OCCUPATIONAL THERAPY | Facility: CLINIC | Age: 63
DRG: 026 | End: 2023-01-05
Attending: NEUROLOGICAL SURGERY
Payer: COMMERCIAL

## 2023-01-05 LAB
ANION GAP SERPL CALCULATED.3IONS-SCNC: 12 MMOL/L (ref 7–15)
BUN SERPL-MCNC: 14.3 MG/DL (ref 8–23)
CALCIUM SERPL-MCNC: 9 MG/DL (ref 8.8–10.2)
CHLORIDE SERPL-SCNC: 105 MMOL/L (ref 98–107)
CREAT SERPL-MCNC: 0.79 MG/DL (ref 0.67–1.17)
DEPRECATED HCO3 PLAS-SCNC: 19 MMOL/L (ref 22–29)
ERYTHROCYTE [DISTWIDTH] IN BLOOD BY AUTOMATED COUNT: 14.5 % (ref 10–15)
GFR SERPL CREATININE-BSD FRML MDRD: >90 ML/MIN/1.73M2
GLUCOSE SERPL-MCNC: 114 MG/DL (ref 70–99)
HCT VFR BLD AUTO: 43.4 % (ref 40–53)
HGB BLD-MCNC: 13.6 G/DL (ref 13.3–17.7)
MAGNESIUM SERPL-MCNC: 1.9 MG/DL (ref 1.7–2.3)
MCH RBC QN AUTO: 28.2 PG (ref 26.5–33)
MCHC RBC AUTO-ENTMCNC: 31.3 G/DL (ref 31.5–36.5)
MCV RBC AUTO: 90 FL (ref 78–100)
PHOSPHATE SERPL-MCNC: 2.2 MG/DL (ref 2.5–4.5)
PLATELET # BLD AUTO: 154 10E3/UL (ref 150–450)
POTASSIUM SERPL-SCNC: 4.3 MMOL/L (ref 3.4–5.3)
RBC # BLD AUTO: 4.82 10E6/UL (ref 4.4–5.9)
SODIUM SERPL-SCNC: 136 MMOL/L (ref 136–145)
WBC # BLD AUTO: 9.7 10E3/UL (ref 4–11)

## 2023-01-05 PROCEDURE — 250N000011 HC RX IP 250 OP 636: Performed by: PHYSICIAN ASSISTANT

## 2023-01-05 PROCEDURE — 120N000002 HC R&B MED SURG/OB UMMC

## 2023-01-05 PROCEDURE — 70450 CT HEAD/BRAIN W/O DYE: CPT

## 2023-01-05 PROCEDURE — 250N000011 HC RX IP 250 OP 636: Performed by: STUDENT IN AN ORGANIZED HEALTH CARE EDUCATION/TRAINING PROGRAM

## 2023-01-05 PROCEDURE — 97535 SELF CARE MNGMENT TRAINING: CPT | Mod: GO

## 2023-01-05 PROCEDURE — 70450 CT HEAD/BRAIN W/O DYE: CPT | Mod: 26 | Performed by: RADIOLOGY

## 2023-01-05 PROCEDURE — 250N000011 HC RX IP 250 OP 636

## 2023-01-05 PROCEDURE — 31575 DIAGNOSTIC LARYNGOSCOPY: CPT | Performed by: PHYSICIAN ASSISTANT

## 2023-01-05 PROCEDURE — 84100 ASSAY OF PHOSPHORUS: CPT

## 2023-01-05 PROCEDURE — 250N000013 HC RX MED GY IP 250 OP 250 PS 637

## 2023-01-05 PROCEDURE — 250N000011 HC RX IP 250 OP 636: Performed by: NEUROLOGICAL SURGERY

## 2023-01-05 PROCEDURE — 250N000009 HC RX 250: Performed by: PHYSICIAN ASSISTANT

## 2023-01-05 PROCEDURE — 97530 THERAPEUTIC ACTIVITIES: CPT | Mod: GO

## 2023-01-05 PROCEDURE — 36415 COLL VENOUS BLD VENIPUNCTURE: CPT

## 2023-01-05 PROCEDURE — 92610 EVALUATE SWALLOWING FUNCTION: CPT | Mod: GN

## 2023-01-05 PROCEDURE — 250N000013 HC RX MED GY IP 250 OP 250 PS 637: Performed by: NEUROLOGICAL SURGERY

## 2023-01-05 PROCEDURE — 250N000013 HC RX MED GY IP 250 OP 250 PS 637: Performed by: STUDENT IN AN ORGANIZED HEALTH CARE EDUCATION/TRAINING PROGRAM

## 2023-01-05 PROCEDURE — 0CJS8ZZ INSPECTION OF LARYNX, VIA NATURAL OR ARTIFICIAL OPENING ENDOSCOPIC: ICD-10-PCS | Performed by: OTOLARYNGOLOGY

## 2023-01-05 PROCEDURE — 99221 1ST HOSP IP/OBS SF/LOW 40: CPT | Mod: 25 | Performed by: PHYSICIAN ASSISTANT

## 2023-01-05 PROCEDURE — 80048 BASIC METABOLIC PNL TOTAL CA: CPT

## 2023-01-05 PROCEDURE — 92526 ORAL FUNCTION THERAPY: CPT | Mod: GN

## 2023-01-05 PROCEDURE — 85027 COMPLETE CBC AUTOMATED: CPT

## 2023-01-05 PROCEDURE — 83735 ASSAY OF MAGNESIUM: CPT

## 2023-01-05 RX ORDER — MAGNESIUM SULFATE HEPTAHYDRATE 40 MG/ML
2 INJECTION, SOLUTION INTRAVENOUS ONCE
Status: COMPLETED | OUTPATIENT
Start: 2023-01-05 | End: 2023-01-05

## 2023-01-05 RX ADMIN — HYDROMORPHONE HYDROCHLORIDE 2 MG: 2 TABLET ORAL at 17:46

## 2023-01-05 RX ADMIN — HYDROMORPHONE HYDROCHLORIDE 2 MG: 2 TABLET ORAL at 06:12

## 2023-01-05 RX ADMIN — POTASSIUM & SODIUM PHOSPHATES POWDER PACK 280-160-250 MG 1 PACKET: 280-160-250 PACK at 12:14

## 2023-01-05 RX ADMIN — LABETALOL HYDROCHLORIDE 20 MG: 5 INJECTION, SOLUTION INTRAVENOUS at 08:17

## 2023-01-05 RX ADMIN — LOSARTAN POTASSIUM 50 MG: 50 TABLET, FILM COATED ORAL at 07:58

## 2023-01-05 RX ADMIN — METHOCARBAMOL 750 MG: 750 TABLET, FILM COATED ORAL at 07:58

## 2023-01-05 RX ADMIN — LABETALOL HYDROCHLORIDE 30 MG: 5 INJECTION, SOLUTION INTRAVENOUS at 03:01

## 2023-01-05 RX ADMIN — LABETALOL HYDROCHLORIDE 20 MG: 5 INJECTION, SOLUTION INTRAVENOUS at 01:46

## 2023-01-05 RX ADMIN — METHOCARBAMOL 750 MG: 750 TABLET, FILM COATED ORAL at 20:13

## 2023-01-05 RX ADMIN — ACETAMINOPHEN 975 MG: 325 TABLET, FILM COATED ORAL at 04:12

## 2023-01-05 RX ADMIN — DEXAMETHASONE 3 MG: 1.5 TABLET ORAL at 07:58

## 2023-01-05 RX ADMIN — POTASSIUM & SODIUM PHOSPHATES POWDER PACK 280-160-250 MG 1 PACKET: 280-160-250 PACK at 15:45

## 2023-01-05 RX ADMIN — POTASSIUM & SODIUM PHOSPHATES POWDER PACK 280-160-250 MG 1 PACKET: 280-160-250 PACK at 07:58

## 2023-01-05 RX ADMIN — Medication 10 MG: at 05:55

## 2023-01-05 RX ADMIN — PHENYLEPHRINE HYDROCHLORIDE 5 ML: 10 INJECTION INTRAVENOUS at 10:30

## 2023-01-05 RX ADMIN — ACETAMINOPHEN 975 MG: 325 TABLET, FILM COATED ORAL at 12:14

## 2023-01-05 RX ADMIN — MAGNESIUM SULFATE IN WATER 2 G: 40 INJECTION, SOLUTION INTRAVENOUS at 08:00

## 2023-01-05 RX ADMIN — HYDRALAZINE HYDROCHLORIDE 20 MG: 20 INJECTION INTRAMUSCULAR; INTRAVENOUS at 00:07

## 2023-01-05 RX ADMIN — HEPARIN SODIUM 5000 UNITS: 5000 INJECTION, SOLUTION INTRAVENOUS; SUBCUTANEOUS at 05:48

## 2023-01-05 RX ADMIN — METHOCARBAMOL 750 MG: 750 TABLET, FILM COATED ORAL at 15:45

## 2023-01-05 RX ADMIN — ACETAMINOPHEN 650 MG: 325 TABLET, FILM COATED ORAL at 15:49

## 2023-01-05 RX ADMIN — HYDROMORPHONE HYDROCHLORIDE 2 MG: 2 TABLET ORAL at 21:23

## 2023-01-05 RX ADMIN — HYDROMORPHONE HYDROCHLORIDE 2 MG: 2 TABLET ORAL at 14:36

## 2023-01-05 RX ADMIN — AMLODIPINE BESYLATE 5 MG: 5 TABLET ORAL at 07:58

## 2023-01-05 RX ADMIN — HYDROMORPHONE HYDROCHLORIDE 2 MG: 2 TABLET ORAL at 00:15

## 2023-01-05 RX ADMIN — HYDRALAZINE HYDROCHLORIDE 10 MG: 20 INJECTION INTRAMUSCULAR; INTRAVENOUS at 16:06

## 2023-01-05 RX ADMIN — HYDROMORPHONE HYDROCHLORIDE 2 MG: 2 TABLET ORAL at 03:01

## 2023-01-05 RX ADMIN — HYDROMORPHONE HYDROCHLORIDE 2 MG: 2 TABLET ORAL at 09:42

## 2023-01-05 RX ADMIN — ACETAMINOPHEN 650 MG: 325 TABLET, FILM COATED ORAL at 20:13

## 2023-01-05 RX ADMIN — METHOCARBAMOL 750 MG: 750 TABLET, FILM COATED ORAL at 12:14

## 2023-01-05 RX ADMIN — HEPARIN SODIUM 5000 UNITS: 5000 INJECTION, SOLUTION INTRAVENOUS; SUBCUTANEOUS at 21:23

## 2023-01-05 ASSESSMENT — ACTIVITIES OF DAILY LIVING (ADL)
ADLS_ACUITY_SCORE: 30
ADLS_ACUITY_SCORE: 26
ADLS_ACUITY_SCORE: 30
ADLS_ACUITY_SCORE: 26
ADLS_ACUITY_SCORE: 24
ADLS_ACUITY_SCORE: 30
ADLS_ACUITY_SCORE: 30
ADLS_ACUITY_SCORE: 24
ADLS_ACUITY_SCORE: 24
ADLS_ACUITY_SCORE: 30

## 2023-01-05 ASSESSMENT — VISUAL ACUITY
OU: NORMAL ACUITY

## 2023-01-05 NOTE — PLAN OF CARE
Major Shift Events: A&Ox4, pupils equal and reactive, equal strength. Stand by assist to ambulate. 2mg oral Dilaudid given q3h for R sided head pain. Head CT done at 0300. On room air most of night, occasionally 2L nc for comfort. Regular diet, voiding adequately using urinal, x1 bowel movement. R crani site intact, open to air w/ sutures, fat graft on abdomen      Plan: continue plan of care, possibly home today     For vital signs and complete assessments, please see documentation flowsheets.

## 2023-01-05 NOTE — CONSULTS
Otolaryngology Consult Note  January 5, 2023    CC: hoarseness    HPI: Kev Chin is a 62 year old male with a past medical history of Factor V Leiden on Eliquis and right CPA tumor, now POD#3 s/p right retrosigmoid craniotomy for resection of CPA tumor and abdominal fat graft. Since surgery patient has noted a weak voice. He denies any voice issues prior to surgery. He reports he is swallowing, denies coughing or choking on liquids. He reports some increased effort with cough, but is able to clear his secretions and mostly complains of headache pain with coughing. He reports having some throat pain initially after surgery that has improved. He did have some increased work of breathing last night, denies SOB currently. ENT was consulted for vocal cord evaluation in the setting of CPA tumor resection and concern for possible cranial nerve compromise.     Past Medical History:   Diagnosis Date     BPH (benign prostatic hyperplasia)      ED (erectile dysfunction)      Heterozygous factor V Leiden mutation (H)      History of 2019 novel coronavirus disease (COVID-19)      HTN (hypertension)      Meningioma (H)      Neuroendocrine tumor of pancreas      Superior mesenteric vein thrombosis (H)        Past Surgical History:   Procedure Laterality Date     CATARACT EXTRACTION  1993     LASER SURGERY OF EYE  09/2021     [facility] endoscopic ultrasound  06/2021     OPTICAL TRACKING SYSTEM CRANIOTOMY, EXCISE TUMOR, COMBINED Right 1/2/2023    Procedure: Stealth assisted right retromastoid craniotomy/craniectomy for tumor;  Surgeon: Quinten Mccray MD;  Location: [facility] OR     PROCURE GRAFT FAT Left 1/2/2023    Procedure: abdominal fat graft;  Surgeon: Quinten Mccray MD;  Location: [facility] OR     QUADRACEPS TENDON REPAIR  2016     REPR RETINAL DETACH; SCLERAL BUCKL  1994     RETINAL DETACHMENT SURGERY  2003     RETINAL DETACHMENT SURGERY  2018       No current outpatient medications on file.          Allergies   Allergen  "Reactions     Quinolones      Thimerosal Unknown       Social History     Socioeconomic History     Marital status:      Spouse name: Not on file     Number of children: Not on file     Years of education: Not on file     Highest education level: Not on file   Occupational History     Not on file   Tobacco Use     Smoking status: Never     Smokeless tobacco: Never   Substance and Sexual Activity     Alcohol use: Yes     Drug use: Never     Sexual activity: Not on file   Other Topics Concern     Parent/sibling w/ CABG, MI or angioplasty before 65F 55M? Not Asked   Social History Narrative     Not on file     Social Determinants of Health     Financial Resource Strain: Not on file   Food Insecurity: Not on file   Transportation Needs: Not on file   Physical Activity: Not on file   Stress: Not on file   Social Connections: Not on file   Intimate Partner Violence: Not on file   Housing Stability: Not on file       Family History   Problem Relation Age of Onset     Anesthesia Reaction No family hx of      Venous thrombosis No family hx of        ROS: 12 point review of systems is negative unless noted in HPI.    PHYSICAL EXAM:  General: sitting up in bed, no acute distress  BP (!) 151/99   Pulse 65   Temp 98  F (36.7  C) (Oral)   Resp 18   Ht 1.93 m (6' 4\")   Wt 112.1 kg (247 lb 2.2 oz)   SpO2 94%   BMI 30.08 kg/m    HEAD: normocephalic, atraumatic  Face: symmetrical, no swelling, edema, or erythema.  Eyes: EOMI, clear sclera  Ears: external auricles appear normal  Nose: no anterior drainage, intact septum with deviation to the left, without perforation or hematoma   Mouth: moist, tongue midline and symmetric  Oropharynx:  uvula midline, no oropharyngeal erythema  Neck: trachea midline  Neuro: cranial nerves 2-12 grossly intact  Respiratory: breathing non-labored on RA, no stridor  Voice: Raspy and hoarse, not breathy. Sharp cough.  Skin: no rashes or skin lesions of the face/neck  Psych: pleasant " affect  Cardio: extremities warm and well perfused     FIBEROPTIC ENDOSCOPY:  Due to hoarseness, fiberoptic laryngoscopy was indicated. After obtaining verbal consent, the nose was topically decongested and anesthetized. The fiberoptic laryngoscope was passed under endoscopic vision through the right nasal passage. The turbinates were normal. The inferior and middle meati were clear without purulence, masses, or polyps. The nasopharynx was clear. The eustachian tubes were clear. The soft palate appeared normal with good mobility. The epiglottis was sharp, and the visualized portion of the vallecula was clear. The larynx was clear. The left vocal cord was mobile with slight bowing of the mid-portion of the cord, the right vocal cord was hypomobile/immobile in the paramedian position. There is a small glottic gap on phonation. The arytenoids were clear, and there was no pooling in the hypopharynx.    ROUTINE IP LABS (Last four results)  BMP  Recent Labs   Lab 01/05/23 0546 01/04/23 0528 01/03/23  0643 01/03/23  0427    137  --  142   POTASSIUM 4.3 4.2  --  3.9   CHLORIDE 105 105  --  110*   FILOMENA 9.0 8.3*  --  8.0*   CO2 19* 22  --  20*   BUN 14.3 15.4  --  22.2   CR 0.79 0.78  --  1.02   * 138* 140* 151*     CBC  Recent Labs   Lab 01/05/23 0546 01/04/23 0528 01/03/23  0427   WBC 9.7 13.1* 15.9*   RBC 4.82 4.64 4.66   HGB 13.6 13.2* 13.0*   HCT 43.4 41.3 40.0   MCV 90 89 86   MCH 28.2 28.4 27.9   MCHC 31.3* 32.0 32.5   RDW 14.5 14.8 14.6    146* 148*     INRNo lab results found in last 7 days.    Imaging:  Results for orders placed or performed during the hospital encounter of 01/02/23   CT Head w/o Contrast    Narrative    CT HEAD W/O CONTRAST 1/2/2023 8:28 PM    History: s/p meningioma resection, please evaluate for hemorrhage     Comparison: Same day head CT    Technique: Using multidetector thin collimation helical acquisition  technique, axial, coronal and sagittal CT images from the skull  base  to the vertex were obtained without intravenous contrast.    Findings:  New post surgical changes of right retromastoid craniotomy and right  cerebellopontine angle mass meningioma resection. New scattered  pneumocephalus, presumed postsurgical. 1.5 x 0.5 cm hyperattenuating  focus along the posterior medial aspect of the resection cavity,  likely representing a small amount of postoperative blood products  (series 3 image 9). Mild periventricular white matter hypodensities,  likely sequela of chronic small vessel ischemic disease. No acute  gray-white differentiation loss. Right base significant. No  hydrocephalus. The ventricles are proportionate to the cerebral sulci.  No acute calvarial fractures. The mastoid air cells are clear. Tiny  mucosal retention cyst in the right maxillary sinus, otherwise the  visualized paranasal sinuses are clear. Atherosclerotic calcifications  of the vertebral arteries.      Impression    Impression:  1. New postsurgical changes of right retromastoid craniotomy and right  cerebellopontine angle mass resection with a small focus of  postoperative blood products along the posteromedial aspect of the  resection cavity.  2. Postsurgical pneumocephalus.    I have personally reviewed the examination and initial interpretation  and I agree with the findings.    FELISA JAIMES MD         SYSTEM ID:  [id number]   CT Head w/o Contrast    Narrative    CT HEAD W/O CONTRAST 1/5/2023 3:56 AM    History: Stability, prior to starting Eliquis     Comparison: Head CT 1/2/2023    Technique: Using multidetector thin collimation helical acquisition  technique, axial, coronal and sagittal CT images from the skull base  to the vertex were obtained without intravenous contrast.    Findings:  Post surgical changes of right retromastoid craniotomy and right  cerebellopontine angle mass meningioma resection. Decreased scattered  pneumocephalus, presumed postsurgical. Previously noted  hyperattenuating  focus along the posterior medial aspect of the  resection cavity is decreased measuring approximately 7 x 7 mm (series  3 image 13). Mild periventricular white matter hypodensities, likely  sequela of chronic small vessel ischemic disease. No acute gray-white  differentiation loss. Right base significant. No hydrocephalus. The  ventricles are proportionate to the cerebral sulci. No acute calvarial  fractures. The mastoid air cells are clear. Tiny mucosal retention  cyst in the right maxillary sinus, otherwise the visualized paranasal  sinuses are clear. Atherosclerotic calcifications of the vertebral  arteries.      Impression    Impression:  1. Postsurgical changes of right retromastoid craniotomy and right  cerebellopontine angle mass resection with decreased focus of  postoperative blood products along the posteromedial aspect of the  resection cavity.  2. Postsurgical pneumocephalus.    I have personally reviewed the examination and initial interpretation  and I agree with the findings.    FELISA JAIMES MD         SYSTEM ID:  [id number]         Assessment and Plan  Kev Chin is a 62 year old male with a past medical history of Factor V Leiden on Eliquis and right CPA tumor, now POD#3 s/p right retrosigmoid craniotomy for resection of CPA tumor and abdominal fat graft. 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 With max effort and cough patient is able to compensate with supraglottic squeeze and produce a productive cough.     - No acute intervention for vocal cord paresis is recommended at this time, as patient has adequate pulmonary toilet, able to communicate well despite hoarseness, and is tolerating a diet  - Recommend outpatient follow up in laryngology clinic in 2-4 weeks for repeat laryngoscopy  - Agree with SLP swallow evaluation   - Remainder of care per primary team.    -- Patient and above plan discussed with Dr. Nathan.     Aida  GLADIS Lawrence  Otolaryngology-Head & Neck Surgery  Please page ENT with questions by dialing [phone] and entering job code [phone] when prompted.

## 2023-01-05 NOTE — PROGRESS NOTES
"   01/05/23 1215   Appointment Info   Signing Clinician's Name / Credentials (SLP) Gloria Butler MA CCC-SLP   General Information   Onset of Illness/Injury or Date of Surgery 01/02/23   Referring Physician Azucena Choi APRN CNP   Patient/Family Therapy Goal Statement (SLP) Pt wants to eat/drink   Pertinent History of Current Problem Pt is a 62-year-old right-handed male with hx of Factor V Leiden on Eliquis who was diagnosed with pancreatic neuroendocrine tumor in 2021 with dotatate scan demonstrating active lesion in the R cerebellomedullary angle. Pt began experiencing dysphonia post op and ENT and SLP were asked to evaluate voice and swallow function. Per ENT scope prior to eval, \"The left vocal cord was mobile with slight bowing of the mid-portion of the cord, the right vocal cord was hypomobile/immobile in the paramedian position. There is a small glottic gap on phonation. The arytenoids were clear, and there was no pooling in the hypopharynx.\" Decision was made not to inject the pt's vocal fold and have pt f/u in ENT clinic. Clinical swallow eval completed.   General Observations Alert, eager   Type of Evaluation   Type of Evaluation Swallow Evaluation   Oral Motor   Oral Musculature generally intact   Structural Abnormalities none present   Mucosal Quality good   Dentition (Oral Motor)   Dentition (Oral Motor) natural dentition   Facial Symmetry (Oral Motor)   Facial Symmetry (Oral Motor) WNL   Lip Function (Oral Motor)   Lip Range of Motion (Oral Motor) WNL   Tongue Function (Oral Motor)   Tongue ROM (Oral Motor) WNL   Jaw Function (Oral Motor)   Jaw Function (Oral Motor) WNL   Cough/Swallow/Gag Reflex (Oral Motor)   Gag Reflex (Oral Motor) WNL  (on R, L not tested)   Comment, Cough/Swallow/Gag Reflex (Oral Motor) pt is able to produce a strong volitional cough despite R TVF paresis   Vocal Quality/Secretion Management (Oral Motor)   Vocal Quality (Oral Motor) dysphonic;hoarse   Comment, Vocal " Quality/Secretion Management (Oral Motor) known R cord paresis   General Swallowing Observations   Past History of Dysphagia None   Respiratory Support (General Swallowing Observations) none   Current Diet/Method of Nutritional Intake (General Swallowing Observations, NIS) NPO   Swallowing Evaluation Clinical swallow evaluation   Clinical Swallow Evaluation   Feeding Assistance no assistance needed   Clinical Swallow Evaluation Textures Trialed thin liquids;pureed;solid foods   Clinical Swallow Eval: Thin Liquid Texture Trial   Mode of Presentation, Thin Liquids straw;cup;self-fed   Volume of Liquid or Food Presented 4oz thin water   Oral Phase of Swallow WFL   Pharyngeal Phase of Swallow impaired;coughing/choking;throat clearing   Diagnostic Statement Consistent throat clearing, coughing at least x1 with thin liquids - despite this, pt denied having any issues swallowing.   Clinical Swallow Evaluation: Puree Solid Texture Trial   Mode of Presentation, Puree spoon;self-fed   Volume of Puree Presented 3oz pudding   Oral Phase, Puree WFL   Pharyngeal Phase, Puree intact   Diagnostic Statement No overt s/sx of aspiration   Clinical Swallow Evaluation: Solid Food Texture Trial   Mode of Presentation self-fed   Volume Presented 1 cracker   Oral Phase WFL   Pharyngeal Phase intact   Diagnostic Statement No overt s/sx of aspiration   Swallowing Recommendations   Diet Consistency Recommendations regular diet;thin liquids (level 0)   Supervision Level for Intake patient independent   Mode of Delivery Recommendations bolus size, small;food moistened;slow rate of intake   Swallowing Maneuver Recommendations alternate food and liquid intake   Monitoring/Assistance Required (Eating/Swallowing) stop eating activities when fatigue is present;monitor for cough or change in vocal quality with intake   Recommended Feeding/Eating Techniques (Swallow Eval) maintain upright sitting position for eating   Medication Administration  Recommendations, Swallowing (SLP) as tolerated   Instrumental Assessment Recommendations VFSS (videofluoroscopic swallowing study)   General Therapy Interventions   Planned Therapy Interventions Dysphagia Treatment   Dysphagia treatment   (VFSS)   Clinical Impression   Criteria for Skilled Therapeutic Interventions Met (SLP Eval) Yes, treatment indicated   SLP Diagnosis Possible dysphagia s/p tumor resection with concern for possible cranial nerve X compromise   Risks & Benefits of therapy have been explained evaluation/treatment results reviewed;care plan/treatment goals reviewed;risks/benefits reviewed;current/potential barriers reviewed;participants voiced agreement with care plan;participants included;patient   Clinical Impression Comments   Clinical swallow eval completed per MD order. Pt presents with possible dysphagia s/p tumor resection and c/f CN X compromise. Oral mech exam remarkable for breathy, rough vocal quality (known R TVF paresis, seen by ENT today). Pt assessed with thin liquids, pudding, and cracker. Inconsistent overt s/sx of aspiration appreciated with thin liquids, no overt s/sx of aspiration with pudding or cracker. Recommend a regular diet/thin liquids, with specific swallowing strategies: upright positioning, slow rate, small bites/sips. Ensure frequent oral care is completed. Recommend VFSS to further evaluate swallow mechanism given increased risk for silent aspiration and pharyngeal weakness in setting of possible CN X compromise. SLP will follow.     SLP Total Evaluation Time   Eval: oral/pharyngeal swallow function, clinical swallow Minutes (99146) 18   Total Session Time   Total Session Time (sum of timed and untimed services) 26

## 2023-01-05 NOTE — PLAN OF CARE
R retrosig craniotomy with abdominal fat graft 1/2. Hx of Factor V Leiden on Eliquis who was diagnosed with pancreatic neuroendocrine tumor in 202. Neuros intact ex hoarse speech d/t R vocal cord-ENT following. VSS ex HTN within parameters. PIV-SL. LBM 1/5. Dilaudid given x1 for frontal forehead pain. Utilizing tylenol, Robaxin, and dilaudid.   Up SBA/GB. Regular diet w/ thin liquids.   Plan for video swallow tomorrow.     Arrived from: 4A   Belongings/meds: with pt   2 RN Skin Assessment Completed by: Shana RN, Estephanie NAVARRETE RN    Non-intact findings documented (yes/no/NA): Blister to R hand intact. Abdominal site e/ steri strips-CDI. R incision-CLAIR. 3 pin sites on head-CLAIR

## 2023-01-05 NOTE — PROGRESS NOTES
"[facility], [place]  Neurosurgery Progress Note:  01/04/2023      Interval History: CT Head this morning stable. Eliquis to start this morning. Clinical exam remains stable.    Assessment:  Mr. Chin is a 62-year-old right-handed male with hx of Factor V Leiden on Eliquis who was diagnosed with pancreatic neuroendocrine tumor in 2021 with dotatate scan demonstrating active lesion in the R cerebellomedullary angle.     Now POD 3 s/p R retrosig craniotomy with abdominal fat graft.     Clinically Significant Risk Factors                        # Obesity: Estimated body mass index is 32.23 kg/m  as calculated from the following:    Height as of this encounter: 1.93 m (6' 4\").    Weight as of this encounter: 120.1 kg (264 lb 12.4 oz)., PRESENT ON ADMISSION            Plan:  Serial neuro exams  Decadron 5 day taper  Start Eliquis today  Repeat CT head this afternoon  Continue PTA losartan and amlodipine  Pain control  HOB > 30 degrees  Regular adult diet  Bowel regimen  PRN antiemetics  PT/OT  SCDs and SQH for DVT prophylaxis  Floor status  Likely discharge home today  -----------------------------------  Juan Grimes MD  Neurosurgery resident, PGY-2  -----------------------------------    Gen: Appears comfortable  Wound: clean, dry, intact scalp and abdominal wounds  Neurologic:  Alert & Oriented to person, place, time, and situation  Follows commands briskly  Speech fluent, spontaneous. No aphasia or dysarthria. Mild hoarseness, improving overnight compared to post op  No gaze preference. No apparent hemineglect.  PERRL, EOMI  Face symmetric with sensation intact to light touch  Palate elevates symmetrically, uvula midline, tongue protrudes midline  Trapezii muscles 5/5 bilaterally  No pronator drift     Del Tr Bi WE WF Gr   R 5 5 5 5 5 5   L 5 5 5 5 5 5    HF KE KF DF PF EHL   R 5 5 5 5 5 5   L 5 5 5 5 5 5     Reflexes 2+ throughout    Sensation intact and symmetric to light touch " throughout    Objective:   Temp:  [97.8  F (36.6  C)-98.4  F (36.9  C)] 97.9  F (36.6  C)  Pulse:  [69-92] 69  Resp:  [14-18] 18  BP: (135-159)/(63-97) 135/85  SpO2:  [93 %-99 %] 95 %  I/O last 3 completed shifts:  In: 635 [P.O.:500; I.V.:135]  Out: 2740 [Urine:2740]        LABS:  Recent Labs   Lab 01/04/23  0528 01/03/23  0643 01/03/23  0427     --  142   POTASSIUM 4.2  --  3.9   CHLORIDE 105  --  110*   CO2 22  --  20*   ANIONGAP 10  --  12   * 140* 151*   BUN 15.4  --  22.2   CR 0.78  --  1.02   FILOMENA 8.3*  --  8.0*       Recent Labs   Lab 01/04/23 0528   WBC 13.1*   RBC 4.64   HGB 13.2*   HCT 41.3   MCV 89   MCH 28.4   MCHC 32.0   RDW 14.8   *       IMAGING:  No results found for this or any previous visit (from the past 24 hour(s)).

## 2023-01-05 NOTE — PLAN OF CARE
Goal Outcome Evaluation: progressing    Pt transferred to 6A from  via w/c. Reason for the transfer: Pt no longer needing ICU care s/p right retrosigmoid craniotomy for tumor resection (per MD note) SBP <160 upon transferring. Pt did get a PRN labetalol 20mg IV this am for sbp >160.  Frontal H/A and posterior neck pain managed by PRN dilaudid po.  On room air..  pt ambulates frequently to the bathroom with SBA and ambulated in the halls with PT. Pt remains NPO except for meds. ENT and SLP saw the patient. SLP ok to recommend regular diet with thin liquids.. awaiting MD order to see if this is OK . Report given to 6A nurse and aware. Pt's significant other notified of the transfer. All belongings sent. See flow sheet for further details.       Plan of Care Reviewed With: patient, significant other

## 2023-01-05 NOTE — PROGRESS NOTES
Neurocritical Care Multi-Disciplinary Note    Reason for critical care admission: s/p resection cerebellomedullary angle meningioma   Admitting Team: neurosurgery  Date of Service:  01/05/2023  Date of Admission:  1/2/2023  Hospital Day: 4    Patient condition reviewed and discussed while on multidisciplinary rounds today.   Please note these minor interventions that were initiated:  1. None    The Neurocritical Care service will continue to follow peripherally while the patient is within the ICU. We are readily available should issues arise. Please feel free to contact us for critical care issues with which we may be of assistance. For all other concerns, please contact primary service first.     Please contact NCC team phone at [phone]    CAMPBELL Ortiz, CNP  Neurocritical Care  *[phone]  Text Page

## 2023-01-06 ENCOUNTER — APPOINTMENT (OUTPATIENT)
Dept: PHYSICAL THERAPY | Facility: CLINIC | Age: 63
DRG: 026 | End: 2023-01-06
Attending: NEUROLOGICAL SURGERY
Payer: COMMERCIAL

## 2023-01-06 ENCOUNTER — APPOINTMENT (OUTPATIENT)
Dept: GENERAL RADIOLOGY | Facility: CLINIC | Age: 63
DRG: 026 | End: 2023-01-06
Payer: COMMERCIAL

## 2023-01-06 ENCOUNTER — APPOINTMENT (OUTPATIENT)
Dept: SPEECH THERAPY | Facility: CLINIC | Age: 63
DRG: 026 | End: 2023-01-06
Attending: NEUROLOGICAL SURGERY
Payer: COMMERCIAL

## 2023-01-06 ENCOUNTER — APPOINTMENT (OUTPATIENT)
Dept: CT IMAGING | Facility: CLINIC | Age: 63
DRG: 026 | End: 2023-01-06
Attending: STUDENT IN AN ORGANIZED HEALTH CARE EDUCATION/TRAINING PROGRAM
Payer: COMMERCIAL

## 2023-01-06 VITALS
TEMPERATURE: 96.3 F | DIASTOLIC BLOOD PRESSURE: 85 MMHG | RESPIRATION RATE: 16 BRPM | BODY MASS INDEX: 30.09 KG/M2 | HEIGHT: 76 IN | OXYGEN SATURATION: 97 % | WEIGHT: 247.14 LBS | HEART RATE: 66 BPM | SYSTOLIC BLOOD PRESSURE: 146 MMHG

## 2023-01-06 LAB
ANION GAP SERPL CALCULATED.3IONS-SCNC: 13 MMOL/L (ref 7–15)
BUN SERPL-MCNC: 17 MG/DL (ref 8–23)
CALCIUM SERPL-MCNC: 8.8 MG/DL (ref 8.8–10.2)
CHLORIDE SERPL-SCNC: 105 MMOL/L (ref 98–107)
CREAT SERPL-MCNC: 0.94 MG/DL (ref 0.67–1.17)
DEPRECATED HCO3 PLAS-SCNC: 19 MMOL/L (ref 22–29)
ERYTHROCYTE [DISTWIDTH] IN BLOOD BY AUTOMATED COUNT: 14.3 % (ref 10–15)
GFR SERPL CREATININE-BSD FRML MDRD: >90 ML/MIN/1.73M2
GLUCOSE SERPL-MCNC: 100 MG/DL (ref 70–99)
HCT VFR BLD AUTO: 41.5 % (ref 40–53)
HGB BLD-MCNC: 13.5 G/DL (ref 13.3–17.7)
MAGNESIUM SERPL-MCNC: 2 MG/DL (ref 1.7–2.3)
MCH RBC QN AUTO: 28.4 PG (ref 26.5–33)
MCHC RBC AUTO-ENTMCNC: 32.5 G/DL (ref 31.5–36.5)
MCV RBC AUTO: 87 FL (ref 78–100)
PATH REPORT.COMMENTS IMP SPEC: ABNORMAL
PATH REPORT.COMMENTS IMP SPEC: YES
PATH REPORT.FINAL DX SPEC: ABNORMAL
PATH REPORT.GROSS SPEC: ABNORMAL
PATH REPORT.MICROSCOPIC SPEC OTHER STN: ABNORMAL
PATH REPORT.RELEVANT HX SPEC: ABNORMAL
PHOSPHATE SERPL-MCNC: 2.6 MG/DL (ref 2.5–4.5)
PHOTO IMAGE: ABNORMAL
PLATELET # BLD AUTO: 149 10E3/UL (ref 150–450)
POTASSIUM SERPL-SCNC: 3.9 MMOL/L (ref 3.4–5.3)
RBC # BLD AUTO: 4.75 10E6/UL (ref 4.4–5.9)
SODIUM SERPL-SCNC: 137 MMOL/L (ref 136–145)
WBC # BLD AUTO: 6.1 10E3/UL (ref 4–11)

## 2023-01-06 PROCEDURE — 70450 CT HEAD/BRAIN W/O DYE: CPT | Mod: 26 | Performed by: RADIOLOGY

## 2023-01-06 PROCEDURE — 92526 ORAL FUNCTION THERAPY: CPT | Mod: GN

## 2023-01-06 PROCEDURE — 97530 THERAPEUTIC ACTIVITIES: CPT | Mod: GP | Performed by: REHABILITATION PRACTITIONER

## 2023-01-06 PROCEDURE — 250N000011 HC RX IP 250 OP 636

## 2023-01-06 PROCEDURE — 250N000013 HC RX MED GY IP 250 OP 250 PS 637

## 2023-01-06 PROCEDURE — 70450 CT HEAD/BRAIN W/O DYE: CPT

## 2023-01-06 PROCEDURE — 92611 MOTION FLUOROSCOPY/SWALLOW: CPT | Mod: GN

## 2023-01-06 PROCEDURE — 74230 X-RAY XM SWLNG FUNCJ C+: CPT

## 2023-01-06 PROCEDURE — 250N000013 HC RX MED GY IP 250 OP 250 PS 637: Performed by: STUDENT IN AN ORGANIZED HEALTH CARE EDUCATION/TRAINING PROGRAM

## 2023-01-06 PROCEDURE — 36415 COLL VENOUS BLD VENIPUNCTURE: CPT

## 2023-01-06 PROCEDURE — 84100 ASSAY OF PHOSPHORUS: CPT

## 2023-01-06 PROCEDURE — 83735 ASSAY OF MAGNESIUM: CPT

## 2023-01-06 PROCEDURE — 80048 BASIC METABOLIC PNL TOTAL CA: CPT

## 2023-01-06 PROCEDURE — 85014 HEMATOCRIT: CPT

## 2023-01-06 PROCEDURE — 74230 X-RAY XM SWLNG FUNCJ C+: CPT | Mod: 26 | Performed by: RADIOLOGY

## 2023-01-06 RX ORDER — HYDROMORPHONE HYDROCHLORIDE 2 MG/1
2 TABLET ORAL EVERY 4 HOURS PRN
Qty: 20 TABLET | Refills: 0 | Status: SHIPPED | OUTPATIENT
Start: 2023-01-06 | End: 2023-01-11

## 2023-01-06 RX ORDER — BARIUM SULFATE 400 MG/ML
SUSPENSION ORAL ONCE
Status: COMPLETED | OUTPATIENT
Start: 2023-01-06 | End: 2023-01-06

## 2023-01-06 RX ORDER — AMOXICILLIN 250 MG
1 CAPSULE ORAL 2 TIMES DAILY
Qty: 10 TABLET | Refills: 0 | Status: SHIPPED | OUTPATIENT
Start: 2023-01-06 | End: 2023-01-11

## 2023-01-06 RX ORDER — METHOCARBAMOL 750 MG/1
750 TABLET, FILM COATED ORAL 4 TIMES DAILY PRN
Qty: 12 TABLET | Refills: 0 | Status: SHIPPED | OUTPATIENT
Start: 2023-01-06 | End: 2023-01-09

## 2023-01-06 RX ORDER — DEXAMETHASONE 1 MG
1 TABLET ORAL DAILY
Qty: 1 TABLET | Refills: 0 | Status: SHIPPED | OUTPATIENT
Start: 2023-01-07 | End: 2023-04-19

## 2023-01-06 RX ADMIN — BARIUM SULFATE 10 ML: 400 SUSPENSION ORAL at 08:28

## 2023-01-06 RX ADMIN — METHOCARBAMOL 750 MG: 750 TABLET, FILM COATED ORAL at 05:31

## 2023-01-06 RX ADMIN — LOSARTAN POTASSIUM 50 MG: 50 TABLET, FILM COATED ORAL at 07:45

## 2023-01-06 RX ADMIN — HYDROMORPHONE HYDROCHLORIDE 2 MG: 2 TABLET ORAL at 07:46

## 2023-01-06 RX ADMIN — HYDROMORPHONE HYDROCHLORIDE 2 MG: 2 TABLET ORAL at 04:00

## 2023-01-06 RX ADMIN — APIXABAN 5 MG: 5 TABLET, FILM COATED ORAL at 07:46

## 2023-01-06 RX ADMIN — AMLODIPINE BESYLATE 5 MG: 5 TABLET ORAL at 07:46

## 2023-01-06 RX ADMIN — ACETAMINOPHEN 650 MG: 325 TABLET, FILM COATED ORAL at 10:31

## 2023-01-06 RX ADMIN — DEXAMETHASONE 2 MG: 2 TABLET ORAL at 07:46

## 2023-01-06 RX ADMIN — HYDROMORPHONE HYDROCHLORIDE 2 MG: 2 TABLET ORAL at 00:39

## 2023-01-06 RX ADMIN — HYDROMORPHONE HYDROCHLORIDE 2 MG: 2 TABLET ORAL at 14:19

## 2023-01-06 RX ADMIN — BARIUM SULFATE 10 ML: 400 SUSPENSION ORAL at 08:51

## 2023-01-06 RX ADMIN — ACETAMINOPHEN 650 MG: 325 TABLET, FILM COATED ORAL at 00:39

## 2023-01-06 RX ADMIN — HYDROMORPHONE HYDROCHLORIDE 2 MG: 2 TABLET ORAL at 11:03

## 2023-01-06 RX ADMIN — METHOCARBAMOL 750 MG: 750 TABLET, FILM COATED ORAL at 13:40

## 2023-01-06 RX ADMIN — ACETAMINOPHEN 650 MG: 325 TABLET, FILM COATED ORAL at 05:31

## 2023-01-06 ASSESSMENT — ACTIVITIES OF DAILY LIVING (ADL)
ADLS_ACUITY_SCORE: 24

## 2023-01-06 NOTE — PLAN OF CARE
Discharge Planner PT   Patient plan for discharge: home with assist   Current status: pt is IND for all functional mob, within precautions.   Barriers to return to prior living situation: crani prec  Recommendations for discharge: per PT eval home with assist as needed.   Rationale for recommendations: pt has progressed well, good support at home.   Pt met 3/3 goals  No DME needs at time of discharge        Entered by: Paulo Leone PTA 01/06/2023 1:15 PM

## 2023-01-06 NOTE — DISCHARGE SUMMARY
[facility] [place] Discharge Summary and Instructions    Kev Chin MRN# [id number]   Age: 62 year old YOB: 1960     Date of Admission:  1/2/2023  Date of Discharge::  1/6/2023  Admitting Physician:  Quinten Mccray MD  Discharge Physician:  Quinten Mccray MD          Admission Diagnoses:   Meningioma (H) [D32.9]  Brain tumor (H) [D49.6]          Discharge Diagnosis:     Meningioma (H) [D32.9]  Brain tumor (H) [D49.6]          Procedures:   Procedure/Surgery Information   Procedure: Procedure(s):  Stealth assisted right retromastoid craniotomy/craniectomy for tumor  abdominal fat graft   Surgeon(s): Surgeon(s) and Role:     * Quinten Mccray MD - Primary     * Damian Braga MD - Resident - Assisting     * Nabor Mansfield MD - Resident - Assisting   Incision time: 7 Hr 46 Min 48 Sec   Specimens: ID Type Source Tests Collected by Time Destination   1 : Right cerebellopontine angle tumor Tissue Other SURGICAL PATHOLOGY EXAM Quinten Mccray MD 1/2/2023  4:20 PM    2 : CUSA contents Tissue Brain SURGICAL PATHOLOGY EXAM Quinten Mccray MD 1/2/2023  4:21 PM                       Brief History of Illness:   From Operative note from Dr. Mccray from 01/02/2023: Mr. Chin is a 62-year-old right-handed male who was diagnosed with pancreatic neuroendocrine tumor in 2021.  His workup included a DOTA-BAKER scan that demonstrated an active lesion in the cerebellomedullary angle for which he was referred.  We initially observed the tumor, but it was found to be growing.  We met, discussed options for management including further continued observation, radiation, and surgical resection.  We discussed the risks and benefits of each approach.  He agreed to proceed with surgery.  Informed consent was obtained.           Hospital Course:   Patient underwent above-mentioned procedure on 01/02/2023. See operative note from 01/02/2023 for further details. The  "operation was uncomplicated and he was admitted to the surgical ICU for routine post operative cares. On post operative day 1 he was doing well. He had a postoperative head CT that showed expected postoperative changes with moderate amount of pneumocephalus. On POD1, he was eating soft foods and drinking appropriately. His arterial line was also removed, and subcutaneous heparin was started as well. On POD2, his pain medications were adjusted, and he was started on oral dilaudid. ON POD3, it was noted his voice sounded more hoarse, an SLP and ENT consult were placed. Direct laryngoscopy by ENT showed R vocal cord palsy. SLP evaluation recommended regular diet, but recommended video swallow study--which did not show any signs of silent aspiration. He was started on eloquis on POD3, a 4 hour head ct was obtained afterwards which was stable. He was discharged to home with the care of his family with plans for ENT and SLP followup. He will also followup with our team in 2 weeks for wound check and suture removal.     Exam:  Gen: Appears comfortable  Wound: clean, dry, intact scalp and abdominal wounds  Neurologic:  Alert & Oriented to person, place, time, and situation  Follows commands briskly  Speech fluent, spontaneous. No aphasia or dysarthria. Mild hoarseness.  No gaze preference. No apparent hemineglect.  PERRL, EOMI  Face symmetric with sensation intact to light touch  Palate elevates symmetrically, uvula midline, tongue protrudes midline  Trapezii muscles 5/5 bilaterally  No pronator drift       Del Tr Bi WE WF Gr   R 5 5 5 5 5 5   L 5 5 5 5 5 5     HF KE KF DF PF EHL   R 5 5 5 5 5 5   L 5 5 5 5 5 5      Reflexes 2+ throughout     Sensation intact and symmetric to light touch throughout         Clinically Significant Risk Factors                        # Obesity: Estimated body mass index is 30.08 kg/m  as calculated from the following:    Height as of this encounter: 1.93 m (6' 4\").    Weight as of this " encounter: 112.1 kg (247 lb 2.2 oz).              Discharge Medications:     Current Discharge Medication List      START taking these medications    Details   dexamethasone (DECADRON) 1 MG tablet Take 1 tablet (1 mg) by mouth daily  Qty: 1 tablet, Refills: 0    Associated Diagnoses: Brain tumor (H)      HYDROmorphone (DILAUDID) 2 MG tablet Take 1 tablet (2 mg) by mouth every 4 hours as needed for pain or severe pain (7-10) (UP TO EVERY 4 HOURS AS NEEDED; IF REFRACTORY TO TYLENOL)  Qty: 20 tablet, Refills: 0    Associated Diagnoses: Brain tumor (H)      methocarbamol (ROBAXIN) 750 MG tablet Take 1 tablet (750 mg) by mouth 4 times daily as needed for muscle spasms  Qty: 12 tablet, Refills: 0    Associated Diagnoses: Brain tumor (H)      senna-docusate (SENOKOT-S/PERICOLACE) 8.6-50 MG tablet Take 1 tablet by mouth 2 times daily for 5 days  Qty: 10 tablet, Refills: 0    Associated Diagnoses: Brain tumor (H)         CONTINUE these medications which have NOT CHANGED    Details   acetaminophen (TYLENOL) 500 MG tablet Take 1,000 mg by mouth every 8 hours as needed      amLODIPine (NORVASC) 5 MG tablet Take 1 tablet by mouth daily      apixaban ANTICOAGULANT (ELIQUIS) 5 MG tablet Take 5 mg by mouth 2 times daily      losartan (COZAAR) 50 MG tablet Take 50 mg by mouth daily      tadalafil (CIALIS) 20 MG tablet TAKE 1 TABLET BY MOUTH 30 TO 60 MINUTES BEFORE INTERCOURSE AS NEEDED. TAKE ONLY ONCE DAILY                     Discharge Instructions and Follow-Up:     Discharge diet: Regular   Discharge activity: You may advance activity as tolerated. No strenuous exercise or heay lifting greater than 10 lbs for 4 weeks or until seen and cleared in clinic.   Discharge follow-up: Follow-up with Dr. Quinten Mccray MD in 2 weeks for wound check and suture removal.    You will follow-up with ENT in 2 weeks.    You will follow-up with outpatient speech therapy.     Wound care: Ok to shower,however no scrubbing of the wound and  no soaking of the wound, meaning no bathtubs or swimming pools. Pat dry only. Leave wound open to air.  Sutures are not absorbable and need to be removed in 2 weeks. If patient still at rehab by this time, the sutures may be removed by the rehab physician if he or she considers that the wound has healed completely.     Please call if you have:  1. increased pain, redness, drainage, swelling at your incision  2. fevers > 101.5 F degrees  3. with any questions or concerns.  You may reach the Neurosurgery clinic at [phone] during regular work hours. ER at [phone].    and ask for the Neurosurgery Resident on call at [phone], during off hours or weekends.         Discharge Disposition:     Radha Kearney MD  Neurosurgery Resident      Discharged to home

## 2023-01-06 NOTE — PLAN OF CARE
Speech Language Therapy Discharge Summary    Reason for therapy discharge:    All goals and outcomes met, no further needs identified.    Progress towards therapy goal(s). See goals on Care Plan in [facility] electronic health record for goal details.  Goals met    Therapy recommendation(s):    Continued therapy is recommended.  Rationale/Recommendations: Recommend the pt continue a regular diet/thin liquids, with specific swallowing strategies: upright positioning, slow rate, small bites/sips, alternate solids/liquids. No additional IP SLP services indicated, but recommend pt f/u with an ENT and SLP in voice clinic to address dysphonia and minimal pharyngeal deficits seen today on VFSS.

## 2023-01-06 NOTE — PLAN OF CARE
Status: s/p Crani for tumor resection. History of pancreatic neuroendocrine tumor  Vitals: intermittently hypertensive, did not require PRN antihypertensives   Neuros: A/Ox4, Strengths 5/5 throughout   IV: PIV SL   Resp/trach: RA  Diet: Regular diet with thins per SLP  Bowel status: LBM 1/5  : Voiding spontaneously   Skin: Head incision CLAIR and without drainage, Pin sites CLAIR and without drainage   Pain: Headache managed with ATC PRN tylenol and PO dilaudid  Activity: Up independently   Plan: ENT and SLP consults yesterday, now with recs for video swallow study then potential discharge.

## 2023-01-06 NOTE — PROGRESS NOTES
"   01/06/23 0848   Appointment Info   Signing Clinician's Name / Credentials (SLP) Gloria Butler MA CCC-SLP   General Information   Onset of Illness/Injury or Date of Surgery 01/02/23   Referring Physician Jareth Alvarez MD   Patient/Family Therapy Goal Statement (SLP) None stated   Pertinent History of Current Problem Pt is a 62-year-old right-handed male with hx of Factor V Leiden on Eliquis who was diagnosed with pancreatic neuroendocrine tumor in 2021 with dotatate scan demonstrating active lesion in the R cerebellomedullary angle. Pt began experiencing dysphonia post op and ENT discovered \"The left vocal cord was mobile with slight bowing of the mid-portion of the cord, the right vocal cord was hypomobile/immobile in the paramedian position. There is a small glottic gap on phonation. The arytenoids were clear, and there was no pooling in the hypopharynx.\" Decision was made not to inject the pt's vocal fold and have pt f/u in ENT clinic. VFSS completed today to further assess risk for aspiration as well as swallow efficiency.   General Observations Alert, eager   Type of Evaluation   Type of Evaluation Swallow Evaluation   General Swallowing Observations   Past History of Dysphagia Evaluated at bedside yesterday, inconsistent s/sx of aspiration   Respiratory Support (General Swallowing Observations) none   Current Diet/Method of Nutritional Intake (General Swallowing Observations, NIS) regular diet;thin liquids (level 0)   Swallowing Evaluation Videofluoroscopic swallow study (VFSS)   VFSS Evaluation   Radiologist Resident   Views Taken left lateral;A/P   Physical Location of Procedure [facility] [address]   VFSS Textures Trialed thin liquids;mildly thick liquids;pureed;solid foods   VFSS Eval: Thin Liquid Texture Trial   Mode of Presentation, Thin Liquid cup;straw;self-fed   Order of Presentation 1, 2, 3, 6, 8, 9 (AP)   Preparatory Phase WFL   Oral Phase, Thin Liquid WFL   Bolus Location When Swallow " Triggered valleculae   Pharyngeal Phase, Thin Liquid   (partial pharyngeal stripping wave)   Rosenbek's Penetration Aspiration Scale: Thin Liquid Trial Results 2 - contrast enters airway, remains above the vocal cords, no residue remains (penetration)   Diagnostic Statement Penetration but no aspiration. Occasional throat clearing throughout eval.   VFSS Eval: Mildly Thick Liquids   Mode of Presentation cup;self-fed   Order of Presentation 4   Preparatory Phase WFL   Oral Phase premature pharyngeal entry   Bolus Location When Swallow Triggered valleculae   Pharyngeal Phase   (c/w thins)   Rosenbek's Penetration Aspiration Scale 2 - contrast enters airway, remains above the vocal cords, no residue remains (penetration)   Diagnostic Statement Penetration, no aspiration   VFSS Evaluation: Puree Solid Texture Trial   Mode of Presentation, Puree spoon;self-fed   Order of Presentation 5, 10 (AP)   Preparatory Phase WFL   Oral Phase, Puree WFL   Bolus Location When Swallow Triggered posterior angle of ramus   Pharyngeal Phase, Puree residue in vallecula   Rosenbek's Penetration Aspiration Scale: Puree Food Trial Results 1 - no aspiration, contrast does not enter airway   Diagnostic Statement Functional swallow   VFSS Evaluation: Solid Food Texture Trial   Mode of Presentation, Solid self-fed   Order of Presentation 7   Preparatory Phase WFL   Oral Phase, Solid WFL   Bolus Location When Swallow Triggered posterior angle of ramus   Pharyngeal Phase, Solid residue in vallecula   Rosenbek's Penetration Aspiration Scale: Solid Food Trial Results 1 - no aspiration, contrast does not enter airway   Diagnostic Statement Moderate residue in vallecula after the swallow, cleared by the next fluoro run   Esophageal Phase of Swallow   Patient reports or presents with symptoms of esophageal dysphagia No   Swallowing Recommendations   Diet Consistency Recommendations regular diet;thin liquids (level 0)   Supervision Level for Intake  patient independent   Mode of Delivery Recommendations bolus size, small;food moistened;slow rate of intake   Swallowing Maneuver Recommendations alternate food and liquid intake   Recommended Feeding/Eating Techniques (Swallow Eval) maintain upright sitting position for eating   Medication Administration Recommendations, Swallowing (SLP) as tolerated   General Therapy Interventions   Planned Therapy Interventions Dysphagia Treatment;Voice   Clinical Impression   Criteria for Skilled Therapeutic Interventions Met (SLP Eval) Yes, treatment indicated   SLP Diagnosis Minimal pharyngeal dysphagia impacting swallowing efficiency   Risks & Benefits of therapy have been explained evaluation/treatment results reviewed;care plan/treatment goals reviewed;risks/benefits reviewed;current/potential barriers reviewed;participants voiced agreement with care plan;participants included;patient   Clinical Impression Comments   Videoswallow study completed per MD order to objectively assess oropharyngeal swallow mechanism. Under fluoroscopy, pt presents with minimal-mild pharyngeal dysphagia in setting of possible R CN X compromise, impacting swallowing efficiency. Pt assessed with thin liquids, mildly-thick liquids, puree, and cookie. Oral phase WFL. Swallow trigger functional (bolus in vallecula). Once triggered, BOT retraction is adequate but pharyngeal stripping wave is partial. Epiglottic inversion complete. Moderate amounts of pharyngeal residue are present in the vallecula after the swallow, but cleared with liquid rinse. In AP, pharyngeal constriction is symmetrical, only slightly more residue in R side of pharynx compared to L. Recommend the pt continue a regular diet/thin liquids, with specific swallowing strategies: upright positioning, slow rate, small bites/sips, alternate solids/liquids. No additional IP SLP services indicated, but recommend pt f/u with an ENT and SLP in voice clinic to address dysphonia and minimal  pharyngeal deficits seen today on VFSS.     SLP Total Evaluation Time   Evaluation, videofluoroscopic eval of swallow function Minutes (25847) 18   SLP Discharge Planning   SLP Plan s/o, f/u with OP SLP in ENT clinc   SLP Discharge Recommendation home with outpatient speech therapy  (in ENT or voice clinic)   SLP Rationale for DC Rec Dysphonia and minimal-mild dysphagia in setting of possible CN X compromise   Total Session Time   Total Session Time (sum of timed and untimed services) 26

## 2023-01-06 NOTE — PROGRESS NOTES
"[facility], [place]  Neurosurgery Progress Note:  01/06/2023      Interval History: Patient was found to have raspy voice. ENT team identified right vocal cord paralysis. Patient was cleared for diet by SLP.    Assessment:  Mr. Chin is a 62-year-old right-handed male with hx of Factor V Leiden on Eliquis who was diagnosed with pancreatic neuroendocrine tumor in 2021 with dotatate scan demonstrating active lesion in the R cerebellomedullary angle.     Now POD 4  s/p R retrosig craniotomy with abdominal fat graft.     Clinically Significant Risk Factors                        # Obesity: Estimated body mass index is 30.08 kg/m  as calculated from the following:    Height as of this encounter: 1.93 m (6' 4\").    Weight as of this encounter: 112.1 kg (247 lb 2.2 oz)., PRESENT ON ADMISSION            Plan:  Serial neuro exams  Video swallow today per SLP  Decadron 5 day taper  Start Eliquis today  Repeat CT head this afternoon  Continue PTA losartan and amlodipine  Pain control  HOB > 30 degrees  Regular adult diet  Bowel regimen  PRN antiemetics  PT/OT  SCDs and SQH for DVT prophylaxis  Floor status  Likely discharge home today  -----------------------------------  Juan Grimes MD  Neurosurgery resident, PGY-2  -----------------------------------    Gen: Appears comfortable  Wound: clean, dry, intact scalp and abdominal wounds  Neurologic:  Alert & Oriented to person, place, time, and situation  Follows commands briskly  Speech fluent, spontaneous. No aphasia or dysarthria. Mild hoarseness, improving overnight compared to post op  No gaze preference. No apparent hemineglect.  PERRL, EOMI  Face symmetric with sensation intact to light touch  Palate elevates symmetrically, uvula midline, tongue protrudes midline  Trapezii muscles 5/5 bilaterally  No pronator drift     Del Tr Bi WE WF Gr   R 5 5 5 5 5 5   L 5 5 5 5 5 5    HF KE KF DF PF EHL   R 5 5 5 5 5 5   L 5 5 5 5 5 5     Reflexes 2+ " throughout    Sensation intact and symmetric to light touch throughout    Objective:   Temp:  [96.5  F (35.8  C)-98.1  F (36.7  C)] 96.5  F (35.8  C)  Pulse:  [63-88] 70  Resp:  [16-20] 16  BP: (134-168)/() 160/90  SpO2:  [94 %-97 %] 95 %  I/O last 3 completed shifts:  In: 490 [P.O.:440; I.V.:50]  Out: 2200 [Urine:2200]        LABS:  Recent Labs   Lab 01/05/23  0546 01/04/23  0528 01/03/23  0643 01/03/23  0427    137  --  142   POTASSIUM 4.3 4.2  --  3.9   CHLORIDE 105 105  --  110*   CO2 19* 22  --  20*   ANIONGAP 12 10  --  12   * 138* 140* 151*   BUN 14.3 15.4  --  22.2   CR 0.79 0.78  --  1.02   FILOMENA 9.0 8.3*  --  8.0*       Recent Labs   Lab 01/05/23  0546   WBC 9.7   RBC 4.82   HGB 13.6   HCT 43.4   MCV 90   MCH 28.2   MCHC 31.3*   RDW 14.5          IMAGING:  Recent Results (from the past 24 hour(s))   CT Head w/o Contrast    Narrative    CT HEAD W/O CONTRAST 1/5/2023 3:56 AM    History: Stability, prior to starting Eliquis     Comparison: Head CT 1/2/2023    Technique: Using multidetector thin collimation helical acquisition  technique, axial, coronal and sagittal CT images from the skull base  to the vertex were obtained without intravenous contrast.    Findings:  Post surgical changes of right retromastoid craniotomy and right  cerebellopontine angle mass meningioma resection. Decreased scattered  pneumocephalus, presumed postsurgical. Previously noted  hyperattenuating focus along the posterior medial aspect of the  resection cavity is decreased measuring approximately 7 x 7 mm (series  3 image 13). Mild periventricular white matter hypodensities, likely  sequela of chronic small vessel ischemic disease. No acute gray-white  differentiation loss. Right base significant. No hydrocephalus. The  ventricles are proportionate to the cerebral sulci. No acute calvarial  fractures. The mastoid air cells are clear. Tiny mucosal retention  cyst in the right maxillary sinus, otherwise the  visualized paranasal  sinuses are clear. Atherosclerotic calcifications of the vertebral  arteries.      Impression    Impression:  1. Postsurgical changes of right retromastoid craniotomy and right  cerebellopontine angle mass resection with decreased focus of  postoperative blood products along the posteromedial aspect of the  resection cavity.  2. Postsurgical pneumocephalus.    I have personally reviewed the examination and initial interpretation  and I agree with the findings.    FELISA JAIMES MD         SYSTEM ID:  [id number]

## 2023-01-06 NOTE — PLAN OF CARE
Status: Admitted 1/2, s/p resection cerebellomedullary angle meningioma. 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   IV: PIV SL  Resp/trach: On RA, LS clear  Diet: Regular   Bowel status: LBM 1/5   : Voids without difficulties  Skin: R head incision CLAIR, abdomenal fat graft w/adhesive strip-CDI, 3 pin sites on head-CLAIR   Pain: Pt reported 2-3/10 headache, PRN Dilauded q3h, Tyelenol q4h, and scheduled Robaxin given  Activity: Independent, walked with family around the [place]   Social: Family came to visit at bedside-supportive   Plan: Plan for video swallow study tomorrow, continue to monitor and follow POC

## 2023-01-06 NOTE — PROGRESS NOTES
Discharge time/date: 2:45 PM 01/06/2023  Walked or Wheelchair: wheelchair   PIV removed: Yes  Reviewed AVS with patient: Yes  Medication due times added to AVS in EPIC: Yes  Verbalized understanding of discharge with teachback: Yes  Medications retrieved from pharmacy: Yes (meds picked up from discharge pharmacy by pt's daughter)  Supplies sent home: No supplies to sent  Belongings from security with patient: belongings with pt

## 2023-01-09 ENCOUNTER — PATIENT OUTREACH (OUTPATIENT)
Dept: NEUROSURGERY | Facility: CLINIC | Age: 63
End: 2023-01-09

## 2023-01-09 DIAGNOSIS — D32.9 MENINGIOMA (H): Primary | ICD-10-CM

## 2023-01-09 NOTE — PLAN OF CARE
Occupational Therapy Discharge Summary    Reason for therapy discharge:    Discharged to home.    Progress towards therapy goal(s). See goals on Care Plan in [facility] electronic health record for goal details.  Goals partially met.  Barriers to achieving goals:   discharge from facility.    Therapy recommendation(s):    No further therapy is recommended. Per last OT who worked with pt, recommending home with assist to progress ADL IND/safety, functional mobility, overall activity tolerance.

## 2023-01-10 ENCOUNTER — TELEPHONE (OUTPATIENT)
Dept: OTOLARYNGOLOGY | Facility: CLINIC | Age: 63
End: 2023-01-10

## 2023-01-10 NOTE — TELEPHONE ENCOUNTER
[facility] Call Center    Phone Message    May a detailed message be left on voicemail: no     Reason for Call: Appointment Intake    Referring Provider Name: Radha Kearney MD  Diagnosis and/or Symptoms: brain tumor    Per discharge, patient needs a follow up in 2 to 4 weeks for a repeat laryngoscopy - due to the right vocal cord paresis in the paramedian position and small glottic gap on phonation.    We do not have openings in the time frame requested, please review    Action Taken: Other: ENT    Travel Screening: Not Applicable

## 2023-01-11 NOTE — TELEPHONE ENCOUNTER
Spoke to patient and significant other in regards to scheduling patient to see laryngology provider and has been scheduled

## 2023-01-11 NOTE — PROGRESS NOTES
Patient reports that he has had complete hearing loss postoperatively and denies that this was present while this was admitted in the hospital. He first noticed hearing loss on 1/6 along with pressure in the right eye. He reports that the pressure is the worst today. In addition, he has experienced intermittent right frontal headaches and suboccipital pressure near craniotomy site. Patient used dilaudid for pain over the weekend but has not needed  Incision appears to be healing well. Patient denies redness, swelling, drainage or bleeding. Patient has been monitoring vitals at home. Vitals WNL and denies fever. Eating and drinking is improving. Patient found to have R vocal cord palsy and feels that swallowing has improved since home. He is to have a 2 week follow up with ENT and requested assistance with getting this scheduled. Message sent to Dr. Nathan's team to assist with this. Reviewed warning signs and reasons to call. Patient and significant other, Vicki, have no other questions at this time.      Plan:  - Complete head CT at earliest convenience. Discussed this with Vicki and provided the number to [facility] imaging scheduling.  -Schedule follow up with ENT.  - Neurosurgery follow up with Poonam Kruger on 1/18.

## 2023-01-11 NOTE — TELEPHONE ENCOUNTER
[facility] Call Center    Phone Message    May a detailed message be left on voicemail: yes     Reason for Call: Other: Pts wife calling in regard to pt having a surgery and he has loss hearing since surgery and was under the impression that he would get into ENT as soon as possible. I dont happen to see any opening . Please reach out to pts wife to discuss.      Action Taken: Message routed to:  [facility] ENT     Travel Screening: Not Applicable

## 2023-01-12 ENCOUNTER — HOSPITAL ENCOUNTER (OUTPATIENT)
Dept: CT IMAGING | Facility: CLINIC | Age: 63
Discharge: HOME | End: 2023-01-12
Attending: NEUROLOGICAL SURGERY | Admitting: NEUROLOGICAL SURGERY
Payer: COMMERCIAL

## 2023-01-12 ENCOUNTER — MYC MEDICAL ADVICE (OUTPATIENT)
Dept: NEUROSURGERY | Facility: CLINIC | Age: 63
End: 2023-01-12

## 2023-01-12 DIAGNOSIS — D32.9 MENINGIOMA (H): ICD-10-CM

## 2023-01-12 PROCEDURE — 70450 CT HEAD/BRAIN W/O DYE: CPT

## 2023-01-17 ENCOUNTER — DOCUMENTATION ONLY (OUTPATIENT)
Dept: OTHER | Facility: CLINIC | Age: 63
End: 2023-01-17
Payer: COMMERCIAL

## 2023-01-18 ENCOUNTER — OFFICE VISIT (OUTPATIENT)
Dept: NEUROSURGERY | Facility: CLINIC | Age: 63
End: 2023-01-18
Payer: COMMERCIAL

## 2023-01-18 VITALS
SYSTOLIC BLOOD PRESSURE: 133 MMHG | DIASTOLIC BLOOD PRESSURE: 86 MMHG | OXYGEN SATURATION: 94 % | WEIGHT: 237.7 LBS | HEART RATE: 82 BPM | BODY MASS INDEX: 28.93 KG/M2

## 2023-01-18 DIAGNOSIS — D32.9 MENINGIOMA (H): Primary | ICD-10-CM

## 2023-01-18 PROCEDURE — 99024 POSTOP FOLLOW-UP VISIT: CPT | Performed by: PHYSICIAN ASSISTANT

## 2023-01-18 ASSESSMENT — PAIN SCALES - GENERAL: PAINLEVEL: MILD PAIN (2)

## 2023-01-18 NOTE — PROGRESS NOTES
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 He's here with his wife, Vicki. He's feeling reasonably well. Feels his voice is slightly improved; he had a postoperative vocal cord palsy on the right. He's had some right sided hearing loss since surgery, this is stable. Head CT was performed  and reassuring. He denies new dripping from his nose or ear. He's had no trouble with his incision other than generalized irritation. He's taking tylenol, denies new headaches.     Surgical pathology revealed meningioma WHO grade I.      Review of Systems:   Pertinent items are noted in HPI or as in patient entered ROS below, remainder of complete ROS is negative.     Physical Exam:   /86 (BP Location: Right arm, Patient Position: Sitting, Cuff Size: Adult Large)   Pulse 82   Wt 107.8 kg (237 lb 11.2 oz)   SpO2 94%   BMI 28.93 kg/m     General: No acute distress. Voice is hoarse sounding but improving.  Eyes: Conjunctivae are normal.  Ears: Right TM with mild fluid, no erythema or bulging. There is a small amount of debris on the eardrum.  MSK: Moves all extremities.  No obvious deformity.  Neuro: The patient is fully oriented. Speech is normal. Extraocular movements are intact without nystagmus. CNX palsy noted, partial. Facial nerve function is normal, rated as a House Brackmann 1. Gait is normal.  Psych: Normal mood and affect. Behavior is normal.    Incision: His right retromastoid incision is clean, dry, and well healed. Sutures  removed today without complication.    Imaging:  No new imaging to review today, though we did walk through his head CT done 1/12 which was reassuring with expected postop changes.     Assessment:  1. 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 Right sided postoperative vocal cord palsy  3. Right sided hearing loss    Plan:  1. We discussed follow up in 3 months, with MRI prior to appointment. They were encouraged to call with questions in the meantime.  2. He'll see Dr. Nathan in ENT next week for follow up evaluation.   3. We discussed likely postoperative from inflammation and fluid. He has fluid in the right mastoid and mild fluid behind the right TM today on exam. Will likely improve with time, reassurance provided.         Poonam Kruger PA-C  Department of Neurosurgery

## 2023-01-18 NOTE — LETTER
2023       RE: Kev Chin  [address]  [facility] [address]     Dear Colleague,    Thank you for referring your patient, Kev Chin, to the [facility] [place] at [facility]. Please see a copy of my visit note below.      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 He's here with his wife, Vicki. He's feeling reasonably well. Feels his voice is slightly improved; he had a postoperative vocal cord palsy on the right. He's had some right sided hearing loss since surgery, this is stable. Head CT was performed  and reassuring. He denies new dripping from his nose or ear. He's had no trouble with his incision other than generalized irritation. He's taking tylenol, denies new headaches.     Surgical pathology revealed meningioma WHO grade I.      Review of Systems:   Pertinent items are noted in HPI or as in patient entered ROS below, remainder of complete ROS is negative.     Physical Exam:   /86 (BP Location: Right arm, Patient Position: Sitting, Cuff Size: Adult Large)   Pulse 82   Wt 107.8 kg (237 lb 11.2 oz)   SpO2 94%   BMI 28.93 kg/m     General: No acute distress. Voice is hoarse sounding but improving.  Eyes: Conjunctivae are normal.  Ears: Right TM with mild fluid, no erythema or bulging. There is a small amount of debris on the eardrum.  MSK: Moves all extremities.  No obvious deformity.  Neuro: The patient is fully oriented.  Speech is normal. Extraocular movements are intact without nystagmus. CNX palsy noted, partial. Facial nerve function is normal, rated as a House Brackmann 1. Gait is normal.  Psych: Normal mood and affect. Behavior is normal.    Incision: His right retromastoid incision is clean, dry, and well healed. Sutures removed today without complication.    Imaging:  No new imaging to review today, though we did walk through his head CT done 1/12 which was reassuring with expected postop changes.     Assessment:  1. 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 Right sided postoperative vocal cord palsy  3. Right sided hearing loss    Plan:  1. We discussed follow up in 3 months, with MRI prior to appointment. They were encouraged to call with questions in the meantime.  2. He'll see Dr. Nathan in ENT next week for follow up evaluation.   3. We discussed likely postoperative from inflammation and fluid. He has fluid in the right mastoid and mild fluid behind the right TM today on exam. Will likely improve with time, reassurance provided.         Poonam Kruger PA-C  Department of Neurosurgery

## 2023-01-24 ENCOUNTER — OFFICE VISIT (OUTPATIENT)
Dept: OTOLARYNGOLOGY | Facility: CLINIC | Age: 63
End: 2023-01-24
Payer: COMMERCIAL

## 2023-01-24 ENCOUNTER — THERAPY VISIT (OUTPATIENT)
Dept: SPEECH THERAPY | Facility: CLINIC | Age: 63
End: 2023-01-24
Payer: COMMERCIAL

## 2023-01-24 VITALS
WEIGHT: 236.9 LBS | HEART RATE: 84 BPM | BODY MASS INDEX: 28.85 KG/M2 | SYSTOLIC BLOOD PRESSURE: 126 MMHG | HEIGHT: 76 IN | OXYGEN SATURATION: 96 % | DIASTOLIC BLOOD PRESSURE: 87 MMHG

## 2023-01-24 DIAGNOSIS — J38.01 VOCAL FOLD PARALYSIS, UNILATERAL: ICD-10-CM

## 2023-01-24 DIAGNOSIS — R49.0 DYSPHONIA: Primary | ICD-10-CM

## 2023-01-24 DIAGNOSIS — R13.12 OROPHARYNGEAL DYSPHAGIA: ICD-10-CM

## 2023-01-24 DIAGNOSIS — R13.12 DYSPHAGIA, OROPHARYNGEAL PHASE: Primary | ICD-10-CM

## 2023-01-24 DIAGNOSIS — J38.01 PARESIS OF RIGHT VOCAL CORD: Primary | ICD-10-CM

## 2023-01-24 PROCEDURE — 99204 OFFICE O/P NEW MOD 45 MIN: CPT | Mod: 25 | Performed by: OTOLARYNGOLOGY

## 2023-01-24 PROCEDURE — 92612 ENDOSCOPY SWALLOW (FEES) VID: CPT | Mod: GN | Performed by: OTOLARYNGOLOGY

## 2023-01-24 PROCEDURE — 92613 ENDOSCOPY SWALLOW (FEES) I&R: CPT | Performed by: OTOLARYNGOLOGY

## 2023-01-24 PROCEDURE — 92610 EVALUATE SWALLOWING FUNCTION: CPT | Mod: GN | Performed by: SPEECH-LANGUAGE PATHOLOGIST

## 2023-01-24 PROCEDURE — 92524 BEHAVRAL QUALIT ANALYS VOICE: CPT | Mod: GN | Performed by: SPEECH-LANGUAGE PATHOLOGIST

## 2023-01-24 ASSESSMENT — PAIN SCALES - GENERAL: PAINLEVEL: MILD PAIN (2)

## 2023-01-24 NOTE — PROGRESS NOTES
[facility]ICE [facility]IN[facility]  CLINICAL EVALUATION REPORT    Patient: Kev Chin  Date of Service: 1/24/2023  Seen in conjunction with: Dr. Mirna Nathan  Referring physician: Dr. Nathan    HISTORY  PATIENT INFORMATION  Kev Chin is a 62 year old male presenting today for initial evaluation of voice and resonance. Salient details of his symptom history are as follows:    The patient reports sudden onset of dysphonia following resection of cerebellopontine angle tumor on  1/2/23. He underwent laryngoscopy with Aida Lawrence PA-C on 1/5/23, which right vocal fold immobility near the midline, as well as mild left-sided bowing. Per her dictation from that time, voice was hoarse, but reasonable pulmonary toilet could be achieved with increased cough effort and associated improvement in glottic closure. Since discharge from the hospital he reports that his voice quality has been gradually improving, but is still not back to normal.  He notes that the more he uses his voice, the worse the quality.  He feels that he has to push to get his voice out.  He has heavy voice demands as a chiropractor.  He has returned to work.    Regarding swallowing, that patient had some initial difficulties following tumor excision, but these subsequently resolved. Videofluoroscopic swallow study during inpatient stay showed penetration with mildly thick and thin liquids, but functional and safe swallow. The patient reports ongoing difficulties, but currently takes a regular texture diet with thin liquids.  He reports that he is very careful when swallowing foods and liquids, particularly solids.    Regarding breathing, he denies any current concerns    The patient reports that he has a history of symptoms of reflux, but that these have not been present since surgery.    OBJECTIVE FINDINGS  PATIENT REPORTED MEASURES  Patient Supplied Answers To [facility] Voice Questionnaire  No flowsheet data found.     Patient Supplied Answers To I  Questionnaire  No flowsheet data found.     Patient Supplied Answers To CSI Questionnaire  No flowsheet data found.     Patient Supplied Answers To EAT Questionnaire  No flowsheet data found.        Self-Ratings as discussed with patient:  No flowsheet data found.     PERCEPTUAL EVALUATION (43323)  VOICE/ SPEECH/ NON-COMMUNICATIVE LARYNGEAL BEHAVIORS EVALUATION    Palpation of the laryngeal area shows:    No significant tenderness of thyrohyoid space; right-sided compression of thyroid lamina results in improved voice quality.    Breathing pattern:     Small breath group size    Cough/ Throat clear:    not observed today     Kev states today is a typical voice day, with clinician observing voice quality characterized by:    Roughness: Profound Consistent    Breathiness: Moderate to severe Consistent    Strain: Moderate Consistent    Habitual pitch is perceptually mildly to moderately high for age and gender    Loudness is moderately reduced for the setting     GRADE, ROUGHNESS, BREATHINESS, ASTHENIA, STRAIN  (GRBAS) scale:  G(3)R(3)B(2)A(0)S(2)    Laryngeal Function Studies (94872)  Laryngeal function studies could not be performed today due to lack of available space in which to perform the studies.    LARYNGEAL EXAMINATION   Procedure: Flexible endoscopy with chip-tip technology without stroboscopy, left nostril; topical anesthesia with 3% Lidocaine and 0.25% phenylephrine was not applied.   Performed by: Dr. Mirna Nathan  Verbal consent was obtained and witnessed prior to this procedure.   A time-out was performed, verifying patient, procedure, and site.   This exam shows:    Velar Function: Right-sided bubbling secretions during sustained /s/    Secretions: Intermittent pooling of secretions in the right piriform sinus    Laryngeal Mucosa: essentially healthy laryngeal mucosa    Vocal fold mucosa:    o RTVF - within normal limits, no visible lesions  o LTVF - within normal limits, no visible lesions    Vocal  fold function:   o Right vocal fold is immobile at the paramedian position and has a mildly to moderately bowed appearance  o Left vocal fold crosses midline, but is still unable to provide glottic closure    Airway is patent    Elongation of the vocal folds for pitch increase is normal    Substantial contraction of the left ventricular fold is observed during connected speech; substantial rotation (counterclockwise) of hyoid bone observed during connected speech    Therapy probes show reduction in supraglottic constriction with cues to reduce loudness level, to lower pitch, and to intentionally build up pressure prior to plosives during voiced stops       STIMULABILITY: results of therapy probes during perceptual and laryngeal evaluation demonstrate improvement with reduction in loudness level, and use of increased pressure prior to voice stops and single syllable words    ASSESSMENT / PLAN  IMPRESSIONS: Kev Chin is a 62 year old male with severe Dysphonia (R49.0) characterized by roughness, breathiness and strain following right cerebellopontine angle tumor resection on 1/2/2023. Laryngeal exam demonstrates ongoing right-sided vocal fold immobility with decreased thyroarytenoid mass, and large residual glottic gap during phonation.  In addition, the patient has been watching [employer] videos to try to find methods of improving voice quality, which appears to have led to compensatory supraglottic constriction.  It is recommended that he begin a brief course of therapy targeted at reducing compensatory strain to reduce vocal effort as voice quality hopefully improves over the next few months.  It was recommended that this be completed with Gina Verde, CCC-SLP, and Dr. Nathan placed this referral today. In addition, the patient was offered injection augmentation by Dr. Nathan today, but he is going to defer this for the time being.  The patient will return in approximately 3 months for repeat laryngeal evaluation,  and consideration of ongoing speech therapy.     This treatment plan was developed with the patient who agreed with the recommendations.    TOTAL SERVICE TIME: 60 minutes  EVALUATION OF VOICE AND RESONANCE (83788)  NO CHARGE FACILITY FEE (70759)    Speech recognition software may have been used in this documentation; input is reviewed before signature to the best of my ability.     Wilbur Contreras, Ph.D., [facility]-SLP  Speech-Language Pathologist-[facility]  [phone]  he/him/his

## 2023-01-24 NOTE — PROGRESS NOTES
[facility]   at the [facility]   Otolaryngology Clinic     Patient: Kev Chin    MRN: [id number]    : 1960    Age/Gender: 62 year old male  Date of Service: 2023  Rendering Provider:   Mirna Nathan MD     Referring Provider   PCP: No Ref-Primary, Physician  Referring Physician: No referring provider defined for this encounter.  Reason for Consultation   Dysphonia  Dysphagia  Right vocal fold paralysis   History   HISTORY OF PRESENT ILLNESS: Mr. Chin is a 62 year old male who presents to us today with dysphonia and dysphagia.      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    He presents today for evaluation. He reports:  - has to be careful when blowing nose  - it can impact his hearing  - had some hearing loss after surgery, which is     Dysphonia  - sudden onset of voice loss after surgery 23  - voice quality has improved a bit with time  - but definity not back to normal  - didn't have any voice issues prior to surgery  - denies squeaky breathing  - voice better in the morning  - worsens throughout day  - he is a chiropractor  - his patients are able to understand him  - he is able to make it through a work day  - but he feels fatigued because he has to work harder  - denies pain to front of neck    Dysphagia  - swallow is fine, but he is more cautious  - when he occasionally has difficulty, it's due to solids rather than liquids  - and left more so on than right  - denies cough with drinking liquids  - no food or liquid travels up the nose    Dyspnea  - denies    Throat clearing/cough  - denies cough with drinking liquids  - feels congested in throat, so coughs before sleep    GERD/LPRD   - no reflux issues since surgery  - did have them before that    PAST MEDICAL HISTORY:   Past Medical History:   Diagnosis Date     BPH (benign prostatic hyperplasia)       ED (erectile dysfunction)      Heterozygous factor V Leiden mutation (H)      History of 2019 novel coronavirus disease (COVID-19)      HTN (hypertension)      Meningioma (H)      Neuroendocrine tumor of pancreas      Superior mesenteric vein thrombosis (H)        PAST SURGICAL HISTORY:   Past Surgical History:   Procedure Laterality Date     CATARACT EXTRACTION  1993     LASER SURGERY OF EYE  09/2021     Farida endoscopic ultrasound  06/2021     OPTICAL TRACKING SYSTEM CRANIOTOMY, EXCISE TUMOR, COMBINED Right 1/2/2023    Procedure: Stealth assisted right retromastoid craniotomy/craniectomy for tumor;  Surgeon: Quinten Mccray MD;  Location: [facility] OR     PROCURE GRAFT FAT Left 1/2/2023    Procedure: abdominal fat graft;  Surgeon: Quinten Mccray MD;  Location: [facility] OR     QUADRACEPS TENDON REPAIR  2016     REPR RETINAL DETACH; SCLERAL BUCKL  1994     RETINAL DETACHMENT SURGERY  2003     RETINAL DETACHMENT SURGERY  2018       CURRENT MEDICATIONS:   Current Outpatient Medications:      acetaminophen (TYLENOL) 500 MG tablet, Take 1,000 mg by mouth every 8 hours as needed, Disp: , Rfl:      amLODIPine (NORVASC) 5 MG tablet, Take 1 tablet by mouth daily, Disp: , Rfl:      apixaban ANTICOAGULANT (ELIQUIS) 5 MG tablet, Take 5 mg by mouth 2 times daily, Disp: , Rfl:      dexamethasone (DECADRON) 1 MG tablet, Take 1 tablet (1 mg) by mouth daily (Patient not taking: Reported on 1/18/2023), Disp: 1 tablet, Rfl: 0     losartan (COZAAR) 50 MG tablet, Take 50 mg by mouth daily, Disp: , Rfl:      tadalafil (CIALIS) 20 MG tablet, TAKE 1 TABLET BY MOUTH 30 TO 60 MINUTES BEFORE INTERCOURSE AS NEEDED. TAKE ONLY ONCE DAILY, Disp: , Rfl:     ALLERGIES: Quinolones and Thimerosal    SOCIAL HISTORY:    Social History     Socioeconomic History     Marital status:      Spouse name: Not on file     Number of children: Not on file     Years of education: Not on file     Highest education level: Not on file   Occupational  History     Not on file   Tobacco Use     Smoking status: Never     Smokeless tobacco: Never   Substance and Sexual Activity     Alcohol use: Yes     Drug use: Never     Sexual activity: Not on file   Other Topics Concern     Parent/sibling w/ CABG, MI or angioplasty before 65F 55M? Not Asked   Social History Narrative     Not on file     Social Determinants of Health     Financial Resource Strain: Not on file   Food Insecurity: Not on file   Transportation Needs: Not on file   Physical Activity: Not on file   Stress: Not on file   Social Connections: Not on file   Intimate Partner Violence: Not on file   Housing Stability: Not on file         FAMILY HISTORY:   Family History   Problem Relation Age of Onset     Anesthesia Reaction No family hx of      Venous thrombosis No family hx of      Non-contributory for problems with anesthesia    REVIEW OF SYSTEMS:   The patient was asked a 14 point review of systems regarding constitutional symptoms, eye symptoms, ears, nose, mouth, throat symptoms, cardiovascular symptoms, respiratory symptoms, gastrointestinal symptoms, genitourinary symptoms, musculoskeletal symptoms, integumentary symptoms, neurological symptoms, psychiatric symptoms, endocrine symptoms, hematologic/lymphatic symptoms, and allergic/ immunologic symptoms.   The pertinent factors have been included in the HPI and below.  Patient Supplied Answers to Review of Systems  [facility] ROS 1/17/2023   Neurology: Headache   Ears, Nose, Throat: Hearing loss, Ear pain, Ringing/noise in ears, Trouble swallowing, Hoarseness       Physical Examination   The patient underwent a physical examination as described below. The pertinent positive and negative findings are summarized after the description of the examination.  Constitutional: The patient's developmental and nutritional status was assessed. The patient's voice quality was assessed.  Head and Face: The head and face were inspected for deformities. The sinuses were  palpated. The salivary glands were palpated. Facial muscle strength was assessed bilaterally.  Eyes: Extraocular movements and primary gaze alignment were assessed.  Ears, Nose, Mouth and Throat: The ears and nose were examined for deformities. The nasal septum, mucosa, and turbinates were inspected by anterior rhinoscopy. The lips, teeth, and gums were examined for abnormalities. The oral mucosa, tongue, palate, tonsils, lateral and posterior pharynx were inspected for the presence of asymmetry or mucosal lesions.    Neck: The tracheal position was noted, and the neck mass palpated to determine if there were any asymmetries, abnormal neck masses, thyromegally, or thyroid nodules.  Respiratory: The nature of the breathing and chest expansion/symmetry was observed.  Cardiovascular: The patient was examined to determine the presence of any edema or jugular venous distension.  Abdomen: The contour of the abdomen was noted.  Lymphatic: The patient was examined for infraclavicular lymphadenopathy.  Musculoskeletal: The patient was inspected for the presence of skeletal deformities.  Extremities: The extremities were examined for any clubbing or cyanosis.  Skin: The skin was examined for inflammatory or neoplastic conditions.  Neurologic: The patient's orientation, mood, and affect were noted. The cranial nerve  functions were examined.  Other pertinent positive and negative findings on physical examination:      OC/OP: no lesions, uvula midline, soft palate elevates symmetrically   Neck: no lesions, no TH tenderness to palpation  All other physical examination findings were within normal limits and noncontributory.  Procedures   Flexible laryngoscopy (CPT 11081)      Pre-procedure diagnosis: dysphonia  Post-procedure diagnosis: same as above  Indication for procedure: Mr. Chin is a 62 year old male with see above  Procedure(s): Fiberoptic Laryngoscopy    Details of Procedure: After informed consent was obtained, the  patient was seated in the examination chair.  The areas of the nasopharynx as well as the hypopharynx were anesthetized with topical 4% lidocaine with 0.25% phenylephrine atomizer.  Examination of the base of tongue was performed first.  Attention was directed to any evidence of masses in the area or evidence of leukoplakia or candidal infection.  Attention was directed to the epiglottis where its size and position was determined and its movement on phonation of the vowel  e .  The piriform sinuses were then inspected for any mass lesions or pooling of secretions.  Attention was then directed to the larynx. The vocal folds were inspected for infection or any areas of leukoplakia, for masses, polypoid degeneration, or hemorrhage.  Having done this, the arytenoids and vocal processes were inspected for erythema or evidence of granuloma formation.  The posterior commissure was then inspected for evidence of inflammatory changes in the mucosa and heaping up of mucosal tissue. The patient was then instructed to say the vowel  e .  Adduction of vocal folds to the midline was observed for any evidence of paresis or paralysis of the larynx or asymmetry in rotation of the larynx to the left or right. The patient was asked to breathe and the degree of abduction was noted bilaterally.  Subglottic view of the larynx was obtained for any additional mass lesions or mucosal changes.  Finally the post cricoid was examined for evidence of pooling of secretions, as well as the pharyngeal wall mucosa.   Anesthesia type: 0.25% phenylephrine    Findings:  Anatomic/physiological deviations: LNC, right vocal fold paralysis, glottic gap   Right vocal process: Marked restriction of mobility   Left vocal process: No restriction of mobility  Glottal gap: Glottal gap  Supraglottic structures: Normal  Hypopharynx: Normal     Estimated Blood Loss: minimal  Complications: None  Disposition: Patient tolerated the procedure  well                Fiberoptic Endoscopic Evaluation of Swallowing (CPT 06084)  and Interpretation of Swallowing (CPT 04385)    Indications: See above notes for complete history and physical. Patient complains of dysphagia to both solids and liquids and/or there is suggestion on history and endoscopic exam of the presence of dysphagia causing medical complaints.  Swallowing evaluation is being performed to assess the presence and degree of dysphagia, and to recommend a safe diet.     Pulmonary Status:  No PNA   Current Diet:              regular                                        thin liquids      Consistency Amounts:  Thin Liquid: sip   Puree: 1 tsp  Solid: cookies            Positioning: upright in a chair  Oral Peripheral Exam: See physical exam section.  Anatomic Notes: See Videostroboscopy report for assessment of anatomy and laryngeal functioning  Pharyngeal secretions prior to administration of liquid or food: No  Oral Phase Abnormal Findings: No abnormal behavior observed  Behavioral Adaptations: No abnormal behavior observed  Pharyngeal Phase Abnormal Findings: no penetration, no aspiration, mild vallecular residue with solids    Recommended Diet:  regular                                        thin liquids             Review of Relevant Clinical Data   I personally reviewed:  Notes:   1/12/23 - [facility] Medical Advice  Per discharge, patient needs a follow up in 2 to 4 weeks for a repeat laryngoscopy - due to the right vocal cord paresis in the paramedian position and small glottic gap on phonation.    1/9/23 - Patient Outreach  Patient reports that he has had complete hearing loss postoperatively and denies that this was present while this was admitted in the hospital. He first noticed hearing loss on 1/6 along with pressure in the right eye. He reports that the pressure is the worst today. In addition, he has experienced intermittent right frontal headaches and suboccipital pressure near craniotomy site.  Patient used dilaudid for pain over the weekend but has not needed  Incision appears to be healing well. Patient denies redness, swelling, drainage or bleeding. Patient has been monitoring vitals at home. Vitals WNL and denies fever. Eating and drinking is improving. Patient found to have R vocal cord palsy and feels that swallowing has improved since home. He is to have a 2 week follow up with ENT and requested assistance with getting this scheduled. Message sent to Dr. Nathan's team to assist with this. Reviewed warning signs and reasons to call. Patient and significant other, Vicki, have no other questions at this time.    1/2/23 Procedure Note: right cerebellomedullary angle meningioma resection    Radiology:   CT Head (1/12/23)  IMPRESSION:   1. Postoperative changes of right retrosigmoid craniotomy and tumor  resection.  2. Decreased pneumocephalus.  3. No acute intracranial abnormality.  4. Fluid attenuation within the right mastoid cavity, unchanged.     XR Video Swallow (1/6/23)  Impression:  1. No penetration or aspiration with administered consistencies of  barium.   2. Please see the speech pathologist report for further details.    The images were reviewed and interpreted of Xray Video Swallow Exam 1/6/23      CT Chest Abdomen Pelvis (10/17/23)  IMPRESSION:     1. Stable size and appearance of dense/hyperenhancing mass at the pancreatic head/uncinate.   2. No convincing evidence of metastatic disease in the chest, abdomen, or pelvis.   3. 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 Recanalization of previously thrombosed SMV.   4. A few small scattered technically indeterminate pulmonary nodules, unchanged. These can be reassessed at follow-up.    Pathology:   Surgical Path (1/2/23)  Final Diagnosis  A-B. Meninges, right cerebellopontine angle tumor and CUSA contents, resection:      - Meningioma,  fibrous histologic subtype (CNS WHO grade 1). See comment.    Labs:  No results found for: TSH  Lab Results   Component Value Date     2023    CO2 19 (L) 2023    BUN 17.0 2023    PHOS 2.6 2023     Lab Results   Component Value Date    WBC 6.1 2023    HGB 13.5 2023    HCT 41.5 2023    MCV 87 2023     (L) 2023     No results found for: PT, PTT, INR  No results found for: MATILDE  No components found for: RHEUMATOIDFACTOR,  RF  No results found for: CRP  No components found for: CKTOT, URICACID  No components found for: C3, C4, DSDNAAB, NDNAABIFA  No results found for: MPOAB    Patient reported Quality of Life (QOL) Measures   Patient Supplied Answers To VHI Questionnaire  No flowsheet data found.      Patient Supplied Answers To EAT Questionnaire  No flowsheet data found.      Patient Supplied Answers To CSI Questionnaire  No flowsheet data found.      Patient Supplied Answers to Dyspnea Index Questionnaire:  No flowsheet data found.    Impression & Plan     IMPRESSION: Mr. Chin is a 62 year old male who is being seen for the followin. Dysphonia  - in the setting of right cerebellomedullary angle meningioma resection 23  - voice quality has improved a bit since, but not back to baseline  - moderate voice demand  - voice worsens throughout day with use  - he feels more fatigued as speaking takes more effort  - scope shows right vocal fold paralysis, glottic gap  - discussed etiology of vocal fold paralysis in this case is due to surgery  - discussed prognosis of vocal fold paralysis is 1 year    - discussed options of treatment which include observation, voice therapy, temporary augmentation vs long term options  - has speech therapy appointments with CLARA Devries in [place]  - 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 Dysphagia  - swallow is fine, but requires caution  - he has difficulty with solids more so, especially on the left  - Xray Video Swallow Exam 1/6/23 with mild pharyngeal weakness  - FEES shows no penetration, no aspiration, mild vallecular residue with solids  - symptoms likely due to pharyngeal weakness  - this is improved   Plan  - swallow precautions    RETURN VISIT: end of March    Thank you for the kind referral and for allowing me to share in the care of Mr. Chin. If you have any questions, please do not hesitate to contact me.    Mirna Nathan MD    Laryngology    [facility]  Department of  Otolaryngology - Head and Neck Surgery  Clinics & Surgery Center  [address] [place] [address]  Appointment line: [phone]  Fax: [phone]  [email or web address]    I, Klarissa Samaniego, am serving as a scribe to document services personally performed by Mirna Nathan MD at this visit, based upon the provider's statements to me. All documentation has been reviewed by the aforementioned provider prior to being entered into the official medical record.

## 2023-01-24 NOTE — PATIENT INSTRUCTIONS
1. Please follow-up in clinic end of march.   2. Please call the ENT clinic with any questions,concerns, new or worsening symptoms.    -Clinic number is [phone]   - Tammy's direct line (Dr. Nathan's nurse) [phone]

## 2023-01-24 NOTE — LETTER
2023       RE: Kev Chin  [address]  [facility] [address]     Dear Colleague,    Thank you for referring your patient, Kev Chin, to the [facility] [place] at [facility]. Please see a copy of my visit note below.            [facility]   at the [facility]   Otolaryngology Clinic     Patient: Kev Chin    MRN: [id number]    : 1960    Age/Gender: 62 year old male  Date of Service: 2023  Rendering Provider:   Mirna Nathan MD     Referring Provider   PCP: No Ref-Primary, Physician  Referring Physician: No referring provider defined for this encounter.  Reason for Consultation   Dysphonia  Dysphagia  Right vocal fold paralysis   History   HISTORY OF PRESENT ILLNESS: Mr. Chin is a 62 year old male who presents to us today with dysphonia and dysphagia.      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    He presents today for evaluation. He reports:  - has to be careful when blowing nose  - it can impact his hearing  - had some hearing loss after surgery, which is     Dysphonia  - sudden onset of voice loss after surgery 23  - voice quality has improved a bit with time  - but definity not back to normal  - didn't have any voice issues prior to surgery  - denies squeaky breathing  - voice better in the morning  - worsens throughout day  - he is a chiropractor  - his patients are able to understand him  - he is able to make it through a work day  - but he feels fatigued because he has to work harder  - denies pain to front of neck    Dysphagia  - swallow is fine, but he is more cautious  - when he occasionally has difficulty, it's due to solids rather than liquids  - and left more so on than right  - denies cough with drinking liquids  - no food or liquid travels up the  nose    Dyspnea  - denies    Throat clearing/cough  - denies cough with drinking liquids  - feels congested in throat, so coughs before sleep    GERD/LPRD   - no reflux issues since surgery  - did have them before that    PAST MEDICAL HISTORY:   Past Medical History:   Diagnosis Date     BPH (benign prostatic hyperplasia)      ED (erectile dysfunction)      Heterozygous factor V Leiden mutation (H)      History of 2019 novel coronavirus disease (COVID-19)      HTN (hypertension)      Meningioma (H)      Neuroendocrine tumor of pancreas      Superior mesenteric vein thrombosis (H)        PAST SURGICAL HISTORY:   Past Surgical History:   Procedure Laterality Date     CATARACT EXTRACTION  1993     LASER SURGERY OF EYE  09/2021     [facility] endoscopic ultrasound  06/2021     OPTICAL TRACKING SYSTEM CRANIOTOMY, EXCISE TUMOR, COMBINED Right 1/2/2023    Procedure: Stealth assisted right retromastoid craniotomy/craniectomy for tumor;  Surgeon: Quinten Mccray MD;  Location: [facility] OR     PROCURE GRAFT FAT Left 1/2/2023    Procedure: abdominal fat graft;  Surgeon: Quinten Mccray MD;  Location: [facility] OR     QUADRACEPS TENDON REPAIR  2016     REPR RETINAL DETACH; SCLERAL BUCKL  1994     RETINAL DETACHMENT SURGERY  2003     RETINAL DETACHMENT SURGERY  2018       CURRENT MEDICATIONS:   Current Outpatient Medications:      acetaminophen (TYLENOL) 500 MG tablet, Take 1,000 mg by mouth every 8 hours as needed, Disp: , Rfl:      amLODIPine (NORVASC) 5 MG tablet, Take 1 tablet by mouth daily, Disp: , Rfl:      apixaban ANTICOAGULANT (ELIQUIS) 5 MG tablet, Take 5 mg by mouth 2 times daily, Disp: , Rfl:      dexamethasone (DECADRON) 1 MG tablet, Take 1 tablet (1 mg) by mouth daily (Patient not taking: Reported on 1/18/2023), Disp: 1 tablet, Rfl: 0     losartan (COZAAR) 50 MG tablet, Take 50 mg by mouth daily, Disp: , Rfl:      tadalafil (CIALIS) 20 MG tablet, TAKE 1 TABLET BY MOUTH 30 TO 60 MINUTES BEFORE INTERCOURSE AS NEEDED.  TAKE ONLY ONCE DAILY, Disp: , Rfl:     ALLERGIES: Quinolones and Thimerosal    SOCIAL HISTORY:    Social History     Socioeconomic History     Marital status:      Spouse name: Not on file     Number of children: Not on file     Years of education: Not on file     Highest education level: Not on file   Occupational History     Not on file   Tobacco Use     Smoking status: Never     Smokeless tobacco: Never   Substance and Sexual Activity     Alcohol use: Yes     Drug use: Never     Sexual activity: Not on file   Other Topics Concern     Parent/sibling w/ CABG, MI or angioplasty before 65F 55M? Not Asked   Social History Narrative     Not on file     Social Determinants of Health     Financial Resource Strain: Not on file   Food Insecurity: Not on file   Transportation Needs: Not on file   Physical Activity: Not on file   Stress: Not on file   Social Connections: Not on file   Intimate Partner Violence: Not on file   Housing Stability: Not on file         FAMILY HISTORY:   Family History   Problem Relation Age of Onset     Anesthesia Reaction No family hx of      Venous thrombosis No family hx of      Non-contributory for problems with anesthesia    REVIEW OF SYSTEMS:   The patient was asked a 14 point review of systems regarding constitutional symptoms, eye symptoms, ears, nose, mouth, throat symptoms, cardiovascular symptoms, respiratory symptoms, gastrointestinal symptoms, genitourinary symptoms, musculoskeletal symptoms, integumentary symptoms, neurological symptoms, psychiatric symptoms, endocrine symptoms, hematologic/lymphatic symptoms, and allergic/ immunologic symptoms.   The pertinent factors have been included in the HPI and below.  Patient Supplied Answers to Review of Systems  [facility] ROS 1/17/2023   Neurology: Headache   Ears, Nose, Throat: Hearing loss, Ear pain, Ringing/noise in ears, Trouble swallowing, Hoarseness       Physical Examination   The patient underwent a physical examination as  described below. The pertinent positive and negative findings are summarized after the description of the examination.  Constitutional: The patient's developmental and nutritional status was assessed. The patient's voice quality was assessed.  Head and Face: The head and face were inspected for deformities. The sinuses were palpated. The salivary glands were palpated. Facial muscle strength was assessed bilaterally.  Eyes: Extraocular movements and primary gaze alignment were assessed.  Ears, Nose, Mouth and Throat: The ears and nose were examined for deformities. The nasal septum, mucosa, and turbinates were inspected by anterior rhinoscopy. The lips, teeth, and gums were examined for abnormalities. The oral mucosa, tongue, palate, tonsils, lateral and posterior pharynx were inspected for the presence of asymmetry or mucosal lesions.    Neck: The tracheal position was noted, and the neck mass palpated to determine if there were any asymmetries, abnormal neck masses, thyromegally, or thyroid nodules.  Respiratory: The nature of the breathing and chest expansion/symmetry was observed.  Cardiovascular: The patient was examined to determine the presence of any edema or jugular venous distension.  Abdomen: The contour of the abdomen was noted.  Lymphatic: The patient was examined for infraclavicular lymphadenopathy.  Musculoskeletal: The patient was inspected for the presence of skeletal deformities.  Extremities: The extremities were examined for any clubbing or cyanosis.  Skin: The skin was examined for inflammatory or neoplastic conditions.  Neurologic: The patient's orientation, mood, and affect were noted. The cranial nerve  functions were examined.  Other pertinent positive and negative findings on physical examination:      OC/OP: no lesions, uvula midline, soft palate elevates symmetrically   Neck: no lesions, no TH tenderness to palpation  All other physical examination findings were within normal limits and  noncontributory.  Procedures   Flexible laryngoscopy (CPT 87486)      Pre-procedure diagnosis: dysphonia  Post-procedure diagnosis: same as above  Indication for procedure: Mr. Chin is a 62 year old male with see above  Procedure(s): Fiberoptic Laryngoscopy    Details of Procedure: After informed consent was obtained, the patient was seated in the examination chair.  The areas of the nasopharynx as well as the hypopharynx were anesthetized with topical 4% lidocaine with 0.25% phenylephrine atomizer.  Examination of the base of tongue was performed first.  Attention was directed to any evidence of masses in the area or evidence of leukoplakia or candidal infection.  Attention was directed to the epiglottis where its size and position was determined and its movement on phonation of the vowel  e .  The piriform sinuses were then inspected for any mass lesions or pooling of secretions.  Attention was then directed to the larynx. The vocal folds were inspected for infection or any areas of leukoplakia, for masses, polypoid degeneration, or hemorrhage.  Having done this, the arytenoids and vocal processes were inspected for erythema or evidence of granuloma formation.  The posterior commissure was then inspected for evidence of inflammatory changes in the mucosa and heaping up of mucosal tissue. The patient was then instructed to say the vowel  e .  Adduction of vocal folds to the midline was observed for any evidence of paresis or paralysis of the larynx or asymmetry in rotation of the larynx to the left or right. The patient was asked to breathe and the degree of abduction was noted bilaterally.  Subglottic view of the larynx was obtained for any additional mass lesions or mucosal changes.  Finally the post cricoid was examined for evidence of pooling of secretions, as well as the pharyngeal wall mucosa.   Anesthesia type: 0.25% phenylephrine    Findings:  Anatomic/physiological deviations: LNC, right vocal fold  paralysis, glottic gap   Right vocal process: Marked restriction of mobility   Left vocal process: No restriction of mobility  Glottal gap: Glottal gap  Supraglottic structures: Normal  Hypopharynx: Normal     Estimated Blood Loss: minimal  Complications: None  Disposition: Patient tolerated the procedure well                Fiberoptic Endoscopic Evaluation of Swallowing (CPT 36759)  and Interpretation of Swallowing (CPT 54879)    Indications: See above notes for complete history and physical. Patient complains of dysphagia to both solids and liquids and/or there is suggestion on history and endoscopic exam of the presence of dysphagia causing medical complaints.  Swallowing evaluation is being performed to assess the presence and degree of dysphagia, and to recommend a safe diet.     Pulmonary Status:  No PNA   Current Diet:              regular                                        thin liquids      Consistency Amounts:  Thin Liquid: sip   Puree: 1 tsp  Solid: cookies            Positioning: upright in a chair  Oral Peripheral Exam: See physical exam section.  Anatomic Notes: See Videostroboscopy report for assessment of anatomy and laryngeal functioning  Pharyngeal secretions prior to administration of liquid or food: No  Oral Phase Abnormal Findings: No abnormal behavior observed  Behavioral Adaptations: No abnormal behavior observed  Pharyngeal Phase Abnormal Findings: no penetration, no aspiration, mild vallecular residue with solids    Recommended Diet:  regular                                        thin liquids             Review of Relevant Clinical Data   I personally reviewed:  Notes:   1/12/23 - My[facility] Medical Advice  Per discharge, patient needs a follow up in 2 to 4 weeks for a repeat laryngoscopy - due to the right vocal cord paresis in the paramedian position and small glottic gap on phonation.    1/9/23 - Patient Outreach  Patient reports that he has had complete hearing loss postoperatively and  denies that this was present while this was admitted in the hospital. He first noticed hearing loss on 1/6 along with pressure in the right eye. He reports that the pressure is the worst today. In addition, he has experienced intermittent right frontal headaches and suboccipital pressure near craniotomy site. Patient used dilaudid for pain over the weekend but has not needed  Incision appears to be healing well. Patient denies redness, swelling, drainage or bleeding. Patient has been monitoring vitals at home. Vitals WNL and denies fever. Eating and drinking is improving. Patient found to have R vocal cord palsy and feels that swallowing has improved since home. He is to have a 2 week follow up with ENT and requested assistance with getting this scheduled. Message sent to Dr. Nathan's team to assist with this. Reviewed warning signs and reasons to call. Patient and significant other, Vicki, have no other questions at this time.    1/2/23 Procedure Note: right cerebellomedullary angle meningioma resection    Radiology:   CT Head (1/12/23)  IMPRESSION:   1. Postoperative changes of right retrosigmoid craniotomy and tumor  resection.  2. Decreased pneumocephalus.  3. No acute intracranial abnormality.  4. Fluid attenuation within the right mastoid cavity, unchanged.     XR Video Swallow (1/6/23)  Impression:  1. No penetration or aspiration with administered consistencies of  barium.   2. Please see the speech pathologist report for further details.    The images were reviewed and interpreted of Xray Video Swallow Exam 1/6/23      CT Chest Abdomen Pelvis (10/17/23)  IMPRESSION:     1. Stable size and appearance of dense/hyperenhancing mass at the pancreatic head/uncinate.   2. No convincing evidence of metastatic disease in the chest, abdomen, or pelvis.   3. 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  Recanalization of previously thrombosed SMV.   4. A few small scattered technically indeterminate pulmonary nodules, unchanged. These can be reassessed at follow-up.    Pathology:   Surgical Path (23)  Final Diagnosis  A-B. 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 See comment.    Labs:  No results found for: TSH  Lab Results   Component Value Date     2023    CO2 19 (L) 2023    BUN 17.0 2023    PHOS 2.6 2023     Lab Results   Component Value Date    WBC 6.1 2023    HGB 13.5 2023    HCT 41.5 2023    MCV 87 2023     (L) 2023     No results found for: PT, PTT, INR  No results found for: MATILDE  No components found for: RHEUMATOIDFACTOR,  RF  No results found for: CRP  No components found for: CKTOT, URICACID  No components found for: C3, C4, DSDNAAB, NDNAABIFA  No results found for: MPOAB    Patient reported Quality of Life (QOL) Measures   Patient Supplied Answers To VHI Questionnaire  No flowsheet data found.      Patient Supplied Answers To EAT Questionnaire  No flowsheet data found.      Patient Supplied Answers To CSI Questionnaire  No flowsheet data found.      Patient Supplied Answers to Dyspnea Index Questionnaire:  No flowsheet data found.    Impression & Plan     IMPRESSION: Mr. Chin is a 62 year old male who is being seen for the followin. Dysphonia  - in the setting of right cerebellomedullary angle meningioma resection 23  - voice quality has improved a bit since, but not back to baseline  - moderate voice demand  - voice worsens throughout day with use  - he feels more fatigued as speaking takes more effort  - scope shows right vocal fold paralysis, glottic gap  - discussed etiology of vocal fold paralysis in this case is due to surgery  - discussed prognosis of vocal fold paralysis is 1 year    - discussed options of treatment  which include observation, voice therapy, temporary augmentation vs long term options  - has speech therapy appointments with CLARA Devries in [place]  - 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 Dysphagia  - swallow is fine, but requires caution  - he has difficulty with solids more so, especially on the left  - Xray Video Swallow Exam 1/6/23 with mild pharyngeal weakness  - FEES shows no penetration, no aspiration, mild vallecular residue with solids  - symptoms likely due to pharyngeal weakness  - this is improved   Plan  - swallow precautions    RETURN VISIT: end of March    Thank you for the kind referral and for allowing me to share in the care of Mr. Chin. If you have any questions, please do not hesitate to contact me.      I, Klarissa Samaniego, am serving as a scribe to document services personally performed by Mirna Nathan MD at this visit, based upon the provider's statements to me. All documentation has been reviewed by the aforementioned provider prior to being entered into the official medical record.         Again, thank you for allowing me to participate in the care of your patient.      Sincerely,    Mirna Nathan MD

## 2023-01-25 NOTE — PROGRESS NOTES
Speech-Language Pathology Department   EVALUATION  [facility]  Clinical Swallow Evaluation visualized under endoscopy performed by Dr Nathan  01/24/23 1500       Present No   General Information   Type Of Visit Initial   Start Of Care Date 01/25/23   Referring Physician Dr Mirna Nathan   Orders Evaluate And Treat   Orders Comment Clinical Swallow Evaluation visualized under endoscopy performed by Dr Nathan   Medical Diagnosis Oropharyngeal dysphagia   Onset Of Illness/injury Or Date Of Surgery 01/24/23   Precautions/limitations No Known Precautions/limitations   Hearing Judged to be adequate in a clinical setting   Pertinent History of Current Problem/OT: Additional Occupational Profile Info Mr. Chin is a 62 year old male who was seen today in conjunction with ENT visit and [facility]. Patients history includes right cerebellomedullary angle meningioma resection on 1/2/23, after which he was found to have right vocal fold paresis. Pt reports that he feels his swallow is going well. He did have a VFSS on 1/6/23 that demonstrates no penetration or aspiration, however noted mild pharyngeal reisdue. Pt reports he does have to be more aware when he is eating and sitting upright. Pt reports he has more difficulty with solids rather than liquids. Pt denies any nasal regurgitation. Pt reports some weight loss since the surgery, but does not feel this is related to inability to eat.   Respiratory Status Room air   Prior Level Of Function Swallowing   Prior Level Of Function Comment Regular diet with thin liquids   General Observations Pt alert and cooperative for swallow assessment   Patient/family Goals None stated for swallowing   Clinical Swallow Evaluation   Oral Musculature generally intact   Structural Abnormalities none present   Dentition present and adequate   Mucosal Quality adequate   Mandibular Strength and Mobility intact   Oral  Labial Strength and Mobility WFL   Lingual Strength and Mobility WFL   Velar Elevation impaired   Buccal Strength and Mobility intact   Laryngeal Function Swallow   Additional Documentation Yes   Additional evaluation(s) completed today No   Clinical Swallow Eval: Thin Liquid Texture Trial   Mode of Presentation, Thin Liquids cup   Volume of Liquid or Food Presented 8oz   Oral Phase of Swallow WFL   Pharyngeal Phase of Swallow intact   Diagnostic Statement On trials of thin water, noted nasal regurgiation. No penetration and no aspiration. Mild residue of thin in the lateral channels, extending down to pyriforms.   Clinical Swallow Eval: Puree Solid Texture Trial   Mode of Presentation, Puree spoon;self-fed   Volume of Puree Presented 2 teaspoons   Oral Phase, Puree WFL   Pharyngeal Phase, Puree intact   Diagnostic Statement No penetration, no aspiration visualized. Mild residue in the vallecula   Clinical Swallow Eval: Regular (Solid)   Mode of Presentation self-fed   Volume Presented 1 cookie   Oral Phase WFL   Pharyngeal Phase intact   Diagnostic Statement No penetration, no aspiration visualized. Mild residue in the vallecula. Residue clears with a liquid wash.   Swallow Compensations   Swallow Compensations Alternate viscosity of consistencies   Educational Assessment   Barriers to Learning No barriers   Swallow Eval: Clinical Impressions   Skilled Criteria for Therapy Intervention No problems identified which require skilled intervention   Functional Assessment Scale (FAS) 6   Treatment Diagnosis Minimal oropharyngeal dysphagia   Diet texture recommendations Thin liquids (level 0);Regular diet   Recommended Feeding/Eating Techniques alternate between small bites and sips of food/liquid;maintain upright posture during/after eating for 30 mins;small sips/bites   Predicted Duration of Therapy Intervention (days/wks) Evaluation only   Risks and Benefits of Treatment have been explained. Yes   Patient, family  and/or staff in agreement with Plan of Care Yes   Clinical Impression Comments Overall, pt presents with minimal oropharyngeal dysphagia. Oral motor assessment demonstrated palatal movement. Pts swallow and palate were assess with endoscopy performed by Dr. Nathan. Upon observing palatal elevation via scope, noted decreased palatal closure on the right. Oral phase of swallow was otherwise WNL. Pharyngeal phase demosntrates no penetration and no aspiration. Noted mild residue of all consistencies in the vallecula and the pyriforms, indicating some pharygneal weakness. Pt was educated on the anatomy and the results of the assessment. Recommend Pt to continue with a regular diet with thin lqiuids. Pt was trained on safe swallowing strategies and provided with written ifnormation. Pt was informed to notify MD if he experiences any changes in his swallow. Pt demonstrates understaing of all information and agrees with the plan of care.   Total Session Time   SLP Eval: oral/pharyngeal swallow function, clinical minutes (83042) 45   Total Evaluation Time 45       Thank you for the referral of Kev Chin. If you have any questions about this report, please contact me using the information below.    Charity Monteiro MS, CCC-SLP  Speech-Language Pathology  [facility] [place]  Department of Otolaryngology/D&T - 4th floor  Phone: [phone]  Email: [email or web address]

## 2023-02-10 ENCOUNTER — TELEPHONE (OUTPATIENT)
Dept: NEUROSURGERY | Facility: CLINIC | Age: 63
End: 2023-02-10
Payer: COMMERCIAL

## 2023-02-10 NOTE — TELEPHONE ENCOUNTER
"Patient reports that he has continued loss of hearing in his right ear. He complains of \"having a lot of internal sounds\" and hopes to see an ENT to address. This has been ongoing since his surgery in January.  "

## 2023-02-14 NOTE — TELEPHONE ENCOUNTER
LVM for patient stating that Dr. Mccray's recommendation would be to have ENT/audiology exam 3-6 months post operatively and that it would not be uncommon to still have hearing issues at this time. He is encouraged to call with additional questions or concerns. Provided writer's direct line for call back.

## 2023-03-28 ENCOUNTER — OFFICE VISIT (OUTPATIENT)
Dept: OTOLARYNGOLOGY | Facility: CLINIC | Age: 63
End: 2023-03-28
Payer: COMMERCIAL

## 2023-03-28 VITALS
DIASTOLIC BLOOD PRESSURE: 96 MMHG | BODY MASS INDEX: 29.54 KG/M2 | WEIGHT: 242.6 LBS | HEIGHT: 76 IN | SYSTOLIC BLOOD PRESSURE: 154 MMHG | HEART RATE: 72 BPM | OXYGEN SATURATION: 97 %

## 2023-03-28 DIAGNOSIS — R49.0 DYSPHONIA: Primary | ICD-10-CM

## 2023-03-28 DIAGNOSIS — J38.01 VOCAL FOLD PARALYSIS, UNILATERAL: ICD-10-CM

## 2023-03-28 PROCEDURE — 31575 DIAGNOSTIC LARYNGOSCOPY: CPT | Performed by: OTOLARYNGOLOGY

## 2023-03-28 PROCEDURE — 92524 BEHAVRAL QUALIT ANALYS VOICE: CPT | Mod: GN | Performed by: SPEECH-LANGUAGE PATHOLOGIST

## 2023-03-28 PROCEDURE — 99213 OFFICE O/P EST LOW 20 MIN: CPT | Mod: 25 | Performed by: OTOLARYNGOLOGY

## 2023-03-28 ASSESSMENT — PAIN SCALES - GENERAL: PAINLEVEL: NO PAIN (0)

## 2023-03-28 NOTE — PATIENT INSTRUCTIONS
1.  You were seen in the ENT Clinic today by Dr. Nathan. If you have any questions or concerns after your appointment, please call [phone]. Press option #1 for scheduling related needs. Press option #3 for Nurse advice.    2.  Dr. Nathan has recommended the following:   - therapy with Marcella       3.  Plan is to return to clinic 4 months      Tammy Arora  [phone]  [facility] - Otolaryngology

## 2023-03-28 NOTE — LETTER
3/28/2023       RE: Kev Chin  [address]  [facility] [address]     Dear Colleague,    Thank you for referring your patient, Kev Chin, to the [facility] [place] at [facility]. Please see a copy of my visit note below.        [facility]   at the [facility]   Otolaryngology Clinic     Patient: Kev Chin    MRN: [id number]    : 1960    Age/Gender: 62 year old male  Date of Service: 3/28/2023  Rendering Provider:   Mirna Nathan MD     Chief Complaint   Dysphonia  Dysphagia  Right vocal fold paralysis   Interval History   HISTORY OF PRESENT ILLNESS: Mr. Chin is a 62 year old male who presents to us today with dysphonia and dysphagia.       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    Today, he presents for follow up. He reports:  - was doing well  - 3 weeks ago, suspects had URI  - tested for COVID at home, negative  - hearing has gotten better  - no worsened swallow  - able to work now  - voice fatigue after conversation throughout day  - eyes were irritated after last visit  - no choking incidents     PAST MEDICAL HISTORY:   Past Medical History:   Diagnosis Date     BPH (benign prostatic hyperplasia)      ED (erectile dysfunction)      Heterozygous factor V Leiden mutation (H)      History of 2019 novel coronavirus disease (COVID-19)      HTN (hypertension)      Meningioma (H)      Neuroendocrine tumor of pancreas      Superior mesenteric vein thrombosis (H)        PAST SURGICAL HISTORY:   Past Surgical History:   Procedure Laterality Date     CATARACT EXTRACTION  1993     LASER SURGERY OF EYE  2021     Farida endoscopic ultrasound  2021     OPTICAL TRACKING SYSTEM CRANIOTOMY, EXCISE TUMOR, COMBINED Right 2023    Procedure: Stealth assisted right retromastoid  craniotomy/craniectomy for tumor;  Surgeon: Quinten Mccray MD;  Location: [facility] OR     PROCURE GRAFT FAT Left 1/2/2023    Procedure: abdominal fat graft;  Surgeon: Quinten Mccray MD;  Location: [facility] OR     QUADRACEPS TENDON REPAIR  2016     REPR RETINAL DETACH; SCLERAL BUCKL  1994     RETINAL DETACHMENT SURGERY  2003     RETINAL DETACHMENT SURGERY  2018       CURRENT MEDICATIONS:   Current Outpatient Medications:      acetaminophen (TYLENOL) 500 MG tablet, Take 1,000 mg by mouth every 8 hours as needed (Patient not taking: Reported on 1/24/2023), Disp: , Rfl:      amLODIPine (NORVASC) 5 MG tablet, Take 1 tablet by mouth daily, Disp: , Rfl:      apixaban ANTICOAGULANT (ELIQUIS) 5 MG tablet, Take 5 mg by mouth 2 times daily, Disp: , Rfl:      dexamethasone (DECADRON) 1 MG tablet, Take 1 tablet (1 mg) by mouth daily (Patient not taking: Reported on 1/18/2023), Disp: 1 tablet, Rfl: 0     losartan (COZAAR) 50 MG tablet, Take 50 mg by mouth daily, Disp: , Rfl:      tadalafil (CIALIS) 20 MG tablet, TAKE 1 TABLET BY MOUTH 30 TO 60 MINUTES BEFORE INTERCOURSE AS NEEDED. TAKE ONLY ONCE DAILY (Patient not taking: Reported on 1/24/2023), Disp: , Rfl:     ALLERGIES: Quinolones and Thimerosal    SOCIAL HISTORY:    Social History     Socioeconomic History     Marital status:      Spouse name: Not on file     Number of children: Not on file     Years of education: Not on file     Highest education level: Not on file   Occupational History     Not on file   Tobacco Use     Smoking status: Never     Smokeless tobacco: Never   Substance and Sexual Activity     Alcohol use: Yes     Drug use: Never     Sexual activity: Not on file   Other Topics Concern     Parent/sibling w/ CABG, MI or angioplasty before 65F 55M? Not Asked   Social History Narrative     Not on file     Social Determinants of Health     Financial Resource Strain: Not on file   Food Insecurity: Not on file   Transportation Needs: Not on file    Physical Activity: Not on file   Stress: Not on file   Social Connections: Not on file   Intimate Partner Violence: Not on file   Housing Stability: Not on file         FAMILY HISTORY:   Family History   Problem Relation Age of Onset     Anesthesia Reaction No family hx of      Venous thrombosis No family hx of       Non-contributory for problems with anesthesia    REVIEW OF SYSTEMS:   The patient was asked a 14 point review of systems regarding constitutional symptoms, eye symptoms, ears, nose, mouth, throat symptoms, cardiovascular symptoms, respiratory symptoms, gastrointestinal symptoms, genitourinary symptoms, musculoskeletal symptoms, integumentary symptoms, neurological symptoms, psychiatric symptoms, endocrine symptoms, hematologic/lymphatic symptoms, and allergic/ immunologic symptoms.   The pertinent factors have been included in the HPI and below.  Patient Supplied Answers to Review of Systems  [facility] ROS 3/22/2023   Neurology: -   Ears, Nose, Throat: Hoarseness       Physical Examination   The patient underwent a physical examination as described below. The pertinent positive and negative findings are summarized after the description of the examination.  Constitutional: The patient's developmental and nutritional status was assessed. The patient's voice quality was assessed.  Head and Face: The head and face were inspected for deformities. The sinuses were palpated. The salivary glands were palpated. Facial muscle strength was assessed bilaterally.  Eyes: Extraocular movements and primary gaze alignment were assessed.  Ears, Nose, Mouth and Throat: The ears and nose were examined for deformities. The nasal septum, mucosa, and turbinates were inspected by anterior rhinoscopy. The lips, teeth, and gums were examined for abnormalities. The oral mucosa, tongue, palate, tonsils, lateral and posterior pharynx were inspected for the presence of asymmetry or mucosal lesions.    Neck: The tracheal position was  noted, and the neck mass palpated to determine if there were any asymmetries, abnormal neck masses, thyromegally, or thyroid nodules.  Respiratory: The nature of the breathing and chest expansion/symmetry was observed.  Cardiovascular: The patient was examined to determine the presence of any edema or jugular venous distension.  Abdomen: The contour of the abdomen was noted.  Lymphatic: The patient was examined for infraclavicular lymphadenopathy.  Musculoskeletal: The patient was inspected for the presence of skeletal deformities.  Extremities: The extremities were examined for any clubbing or cyanosis.  Skin: The skin was examined for inflammatory or neoplastic conditions.  Neurologic: The patient's orientation, mood, and affect were noted. The cranial nerve  functions were examined.  Other pertinent positive and negative findings on physical examination:   OC/OP: no lesions, uvula midline, soft palate elevates symmetrically   Neck: no lesions, no TH tenderness to palpation  All other physical examination findings were within normal limits and noncontributory.    Procedures   Flexible laryngoscopy (CPT 33707)        Pre-procedure diagnosis: dysphonia  Post-procedure diagnosis: same as above  Indication for procedure: Mr. Chin is a 62 year old male with see above  Procedure(s): Fiberoptic Laryngoscopy     Details of Procedure: After informed consent was obtained, the patient was seated in the examination chair.  The areas of the nasopharynx as well as the hypopharynx were anesthetized with topical 4% lidocaine with 0.25% phenylephrine atomizer.  Examination of the base of tongue was performed first.  Attention was directed to any evidence of masses in the area or evidence of leukoplakia or candidal infection.  Attention was directed to the epiglottis where its size and position was determined and its movement on phonation of the vowel  e .  The piriform sinuses were then inspected for any mass lesions or pooling  of secretions.  Attention was then directed to the larynx. The vocal folds were inspected for infection or any areas of leukoplakia, for masses, polypoid degeneration, or hemorrhage.  Having done this, the arytenoids and vocal processes were inspected for erythema or evidence of granuloma formation.  The posterior commissure was then inspected for evidence of inflammatory changes in the mucosa and heaping up of mucosal tissue. The patient was then instructed to say the vowel  e .  Adduction of vocal folds to the midline was observed for any evidence of paresis or paralysis of the larynx or asymmetry in rotation of the larynx to the left or right. The patient was asked to breathe and the degree of abduction was noted bilaterally.  Subglottic view of the larynx was obtained for any additional mass lesions or mucosal changes.  Finally the post cricoid was examined for evidence of pooling of secretions, as well as the pharyngeal wall mucosa.   Anesthesia type: 0.25% phenylephrine     Findings:  Anatomic/physiological deviations: LNC, persistent right vocal fold paralysis with large glottic gap                Right vocal process: Marked restriction of mobility   Left vocal process: No restriction of mobility  Glottal gap: Glottal gap  Supraglottic structures: Normal  Hypopharynx: Normal      Estimated Blood Loss: minimal  Complications: None  Disposition: Patient tolerated the procedure well           Review of Relevant Clinical Data   I personally reviewed:  Labs:  No results found for: TSH  Lab Results   Component Value Date     01/06/2023    CO2 19 (L) 01/06/2023    BUN 17.0 01/06/2023    PHOS 2.6 01/06/2023     Lab Results   Component Value Date    WBC 6.1 01/06/2023    HGB 13.5 01/06/2023    HCT 41.5 01/06/2023    MCV 87 01/06/2023     (L) 01/06/2023     No results found for: PT, PTT, INR  No results found for: MATILDE  No components found for: RHEUMATOIDFACTOR,  RF  No results found for: CRP  No components  found for: CKTOT, URICACID  No components found for: C3, C4, DSDNAAB, NDNAABIFA  No results found for: MPOAB    Patient reported Quality of Life (QOL) Measures   Patient Supplied Answers To VHI Questionnaire  No flowsheet data found.      Patient Supplied Answers To EAT Questionnaire  No flowsheet data found.      Patient Supplied Answers To CSI Questionnaire  No flowsheet data found.      Patient Supplied Answers to Dyspnea Index Questionnaire:  No flowsheet data found.    Impression & Plan     IMPRESSION: Mr. Chin is a 62 year old male who is being seen for the followin. Dysphonia  - in the setting of right cerebellomedullary angle meningioma resection 23  - voice quality has improved a bit since, but not back to baseline  - moderate voice demand  - voice worsens throughout day with use  - he feels more fatigued as speaking takes more effort  - scope shows right vocal fold paralysis, glottic gap  - discussed etiology of vocal fold paralysis in this case is due to surgery  - discussed prognosis of vocal fold paralysis is 1 year    - discussed options of treatment which include observation, voice therapy, temporary augmentation vs long term options  - has speech therapy appointments with CLARA Devries in [place]  - 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, though he reports voice has improved somewhat over the next few weeks  - would recommend observing and if continues to have pain or lack of further improvement - would recommend temporary filler   - also would recommend voice therapy with a voice specific therapist - like Marcella Verde   - symptoms 3/28/2023 are stable and no pain with talking, able to use voice at work, has been through a lot and would like to proceed with observation, was never called to have follow up with voice therapy  - scope shows persistent right vocal fold paralysis with large glottic gap   - discussed utility of  EMG  Plan  - continue observation  - will schedule voice therapy sessions with Marcella     2. Dysphagia  - swallow is fine, but requires caution  - he has difficulty with solids more so, especially on the left  - Xray Video Swallow Exam 1/6/23 with mild pharyngeal weakness  - FEES shows no penetration, no aspiration, mild vallecular residue with solids  - symptoms likely due to pharyngeal weakness  - this is improved   - symptoms 3/28/23 are no swallowing problems, no further choking  Plan  - swallow precautions    RETURN VISIT: in the summer (July or August)    Mirna Nathan MD    Laryngology    [facility]  Department of  Otolaryngology - Head and Neck Surgery  Clinics & Surgery Center  [address] [place] [address]  Appointment line: [phone]  Fax: [phone]  [email or web address]     IKlarissa, am serving as a scribe to document services personally performed by Mirna Nathan MD at this visit, based upon the provider's statements to me. All documentation has been reviewed by the aforementioned provider prior to being entered into the official medical record.         Again, thank you for allowing me to participate in the care of your patient.      Sincerely,    Mirna Nathan MD

## 2023-03-28 NOTE — LETTER
Date:March 29, 2023      Patient was self referred, no letter generated. Do not send.        [facility] Health Information

## 2023-03-28 NOTE — PROGRESS NOTES
[facility]  CLINICAL EVALUATION REPORT    Patient: Kev Chin  Date of Service: 3/28/2023  Seen in conjunction with: Dr. Mirna Nathan  Referring physician: Dr. Nathan    HISTORY  PATIENT INFORMATION  Kev Chin is a 62 year old male presenting today for follow-up evaluation of voice and resonance. Salient details of his symptom history are as follows:    The patient was previously seen in this clinic on 1/24/2023 for evaluation of voice and resonance following sudden onset of dysphonia after resection of cerebellopontine angle tumor on 1/2/2023.  At that time a right-sided vocal fold paralysis was observed, as well as compensatory supraglottic constriction, which was contributing to patient's fatigue.  At that time, it was recommended that a course of speech therapy to be completed in order to optimize phonatory efficiency.  Unfortunately there was some scheduling difficulties and the patient did not complete a course of therapy.  He presents today for follow-up evaluation.    Interim history:  The patient reports that since his last evaluation, his voice feels like it began to become stronger, such that he is able to get through the day of work with people understanding him the whole time, and only experiencing some vocal fatigue by the end of the day.  About 3 weeks ago, he had an upper respiratory infection which caused a bit of a backslide, but he still feels that his voice is improved compared to his last visit.  He denies breathing difficulties, and denies swallowing difficulties.  He denies symptoms of reflux.    OBJECTIVE FINDINGS  PATIENT REPORTED MEASURES  Patient Supplied Answers To [facility] Intake Voice Questionnaire  No flowsheet data found.     Patient Supplied Answers To VHI Questionnaire  No flowsheet data found.     Patient Supplied Answers To CSI Questionnaire  No flowsheet data found.     Patient Supplied Answers To EAT Questionnaire  No flowsheet data found.        Self-Ratings as discussed  with patient:  No flowsheet data found.     PERCEPTUAL EVALUATION (22756)  VOICE/ SPEECH/ NON-COMMUNICATIVE LARYNGEAL BEHAVIORS EVALUATION    Breathing pattern:     appears within normal limits and adequate    Cough/ Throat clear:    not observed today     Kev states today is a typical voice day, with clinician observing voice quality characterized by:    Roughness: Severe to profound Consistent    Breathiness: Moderate Consistent    Strain: Moderate Consistent    Habitual pitch is perceptually mildly high for age and gender    Loudness is mildly to moderately reduced for the setting     GRADE, ROUGHNESS, BREATHINESS, ASTHENIA, STRAIN  (GRBAS) scale:  G(3)R(3)B(3)A(0)S(2)    LARYNGEAL EXAMINATION  Procedure: Flexible endoscopy with chip-tip technology without stroboscopy, right nostril; topical anesthesia with 3% Lidocaine and 0.25% phenylephrine was applied.   Performed by: Dr. Mirna Nathan  Verbal consent was obtained and witnessed prior to this procedure.   A time-out was performed, verifying patient, procedure, and site.   This exam shows:    Velar Function: Within normal limits with no bubbling of secretions    Secretions: Within normal limits    Laryngeal Mucosa: essentially healthy laryngeal mucosa    Vocal fold mucosa:    o RTVF - within normal limits, no visible lesions  o LTVF - within normal limits, no visible lesions    Vocal fold function:   o Right vocal fold is densely paretic in the paramedian position  o Left vocal fold crosses midline to provide transient glottic closure    Airway is widely patent    Elongation of the vocal folds for pitch increase is limited    Mild compression of the left ventricular fold is observed during phonation attempts    Therapy probes show slight reduction in left ventricular fold compression during sustained phonation on nasal continuants    The addition of stroboscopy provided the following information:    Stroboscopy could not be completed due to the aperiodic nature  of the voice signal      recommendations. ASSESSMENT / PLAN  IMPRESSIONS: Kev Chin is a 62 year old male with severe Dysphonia (R49.0) characterized by roughness, breathiness, and strain.  Laryngeal exam demonstrates Vocal Fold Paresis - Unilateral Right, complete (J38.00).  While the reports some degree of subjective improvement in voice quality, the position of the right paralyzed vocal fold accounts for ongoing severe dysphonia.  Despite this, there are moments when the patient's voice quality is substantially improved, particularly when improving coordination between respiration and phonation in an optimal pitch range.  For this reason, a course of speech therapy is recommended in order to optimize vocal technique and improve voice quality, as well as to reduce discomfort and vocal effort.  This will be completed with Marcella Casey, CCC-SLP.    He demonstrates a Good prognosis for improvement given adherence to therapeutic    Positive indicators: high level of comittment    Negative indicators: None    This treatment plan was developed with the patient who agreed with the recommendations.    TOTAL SERVICE TIME: 60 minutes  EVALUATION OF VOICE AND RESONANCE (07051)  NO CHARGE FACILITY FEE (44066)    Speech recognition software may have been used in this documentation; input is reviewed before signature to the best of my ability.     Wilbur Contreras, Ph.D., CCC-SLP  Speech-Language Pathologist-[facility]  [phone]  he/him/his

## 2023-03-28 NOTE — PROGRESS NOTES
[facility]   at the [facility]   Otolaryngology Clinic     Patient: Kev Chin    MRN: [id number]    : 1960    Age/Gender: 62 year old male  Date of Service: 3/28/2023  Rendering Provider:   Mirna Nathan MD     Chief Complaint   Dysphonia  Dysphagia  Right vocal fold paralysis   Interval History   HISTORY OF PRESENT ILLNESS: Mr. Chin is a 62 year old male who presents to us today with dysphonia and dysphagia.       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    Today, he presents for follow up. He reports:  - was doing well  - 3 weeks ago, suspects had URI  - tested for COVID at home, negative  - hearing has gotten better  - no worsened swallow  - able to work now  - voice fatigue after conversation throughout day  - eyes were irritated after last visit  - no choking incidents     PAST MEDICAL HISTORY:   Past Medical History:   Diagnosis Date     BPH (benign prostatic hyperplasia)      ED (erectile dysfunction)      Heterozygous factor V Leiden mutation (H)      History of 2019 novel coronavirus disease (COVID-19)      HTN (hypertension)      Meningioma (H)      Neuroendocrine tumor of pancreas      Superior mesenteric vein thrombosis (H)        PAST SURGICAL HISTORY:   Past Surgical History:   Procedure Laterality Date     CATARACT EXTRACTION       LASER SURGERY OF EYE  2021     Far[place] endoscopic ultrasound  2021     OPTICAL TRACKING SYSTEM CRANIOTOMY, EXCISE TUMOR, COMBINED Right 2023    Procedure: Stealth assisted right retromastoid craniotomy/craniectomy for tumor;  Surgeon: Quinten Mccray MD;  Location: [facility] OR     PROCURE GRAFT FAT Left 2023    Procedure: abdominal fat graft;  Surgeon: Quinten Mccray MD;  Location: [facility] OR     QUADRACEPS TENDON REPAIR  2016     REPR RETINAL DETACH; SCLERAL BUCKL  1994     RETINAL DETACHMENT SURGERY        RETINAL DETACHMENT SURGERY  2018       CURRENT MEDICATIONS:   Current Outpatient Medications:      acetaminophen (TYLENOL) 500 MG tablet, Take 1,000 mg by mouth every 8 hours as needed (Patient not taking: Reported on 1/24/2023), Disp: , Rfl:      amLODIPine (NORVASC) 5 MG tablet, Take 1 tablet by mouth daily, Disp: , Rfl:      apixaban ANTICOAGULANT (ELIQUIS) 5 MG tablet, Take 5 mg by mouth 2 times daily, Disp: , Rfl:      dexamethasone (DECADRON) 1 MG tablet, Take 1 tablet (1 mg) by mouth daily (Patient not taking: Reported on 1/18/2023), Disp: 1 tablet, Rfl: 0     losartan (COZAAR) 50 MG tablet, Take 50 mg by mouth daily, Disp: , Rfl:      tadalafil (CIALIS) 20 MG tablet, TAKE 1 TABLET BY MOUTH 30 TO 60 MINUTES BEFORE INTERCOURSE AS NEEDED. TAKE ONLY ONCE DAILY (Patient not taking: Reported on 1/24/2023), Disp: , Rfl:     ALLERGIES: Quinolones and Thimerosal    SOCIAL HISTORY:    Social History     Socioeconomic History     Marital status:      Spouse name: Not on file     Number of children: Not on file     Years of education: Not on file     Highest education level: Not on file   Occupational History     Not on file   Tobacco Use     Smoking status: Never     Smokeless tobacco: Never   Substance and Sexual Activity     Alcohol use: Yes     Drug use: Never     Sexual activity: Not on file   Other Topics Concern     Parent/sibling w/ CABG, MI or angioplasty before 65F 55M? Not Asked   Social History Narrative     Not on file     Social Determinants of Health     Financial Resource Strain: Not on file   Food Insecurity: Not on file   Transportation Needs: Not on file   Physical Activity: Not on file   Stress: Not on file   Social Connections: Not on file   Intimate Partner Violence: Not on file   Housing Stability: Not on file         FAMILY HISTORY:   Family History   Problem Relation Age of Onset     Anesthesia Reaction No family hx of      Venous thrombosis No family hx of       Non-contributory for  problems with anesthesia    REVIEW OF SYSTEMS:   The patient was asked a 14 point review of systems regarding constitutional symptoms, eye symptoms, ears, nose, mouth, throat symptoms, cardiovascular symptoms, respiratory symptoms, gastrointestinal symptoms, genitourinary symptoms, musculoskeletal symptoms, integumentary symptoms, neurological symptoms, psychiatric symptoms, endocrine symptoms, hematologic/lymphatic symptoms, and allergic/ immunologic symptoms.   The pertinent factors have been included in the HPI and below.  Patient Supplied Answers to Review of Systems  [facility] ROS 3/22/2023   Neurology: -   Ears, Nose, Throat: Hoarseness       Physical Examination   The patient underwent a physical examination as described below. The pertinent positive and negative findings are summarized after the description of the examination.  Constitutional: The patient's developmental and nutritional status was assessed. The patient's voice quality was assessed.  Head and Face: The head and face were inspected for deformities. The sinuses were palpated. The salivary glands were palpated. Facial muscle strength was assessed bilaterally.  Eyes: Extraocular movements and primary gaze alignment were assessed.  Ears, Nose, Mouth and Throat: The ears and nose were examined for deformities. The nasal septum, mucosa, and turbinates were inspected by anterior rhinoscopy. The lips, teeth, and gums were examined for abnormalities. The oral mucosa, tongue, palate, tonsils, lateral and posterior pharynx were inspected for the presence of asymmetry or mucosal lesions.    Neck: The tracheal position was noted, and the neck mass palpated to determine if there were any asymmetries, abnormal neck masses, thyromegally, or thyroid nodules.  Respiratory: The nature of the breathing and chest expansion/symmetry was observed.  Cardiovascular: The patient was examined to determine the presence of any edema or jugular venous distension.  Abdomen:  The contour of the abdomen was noted.  Lymphatic: The patient was examined for infraclavicular lymphadenopathy.  Musculoskeletal: The patient was inspected for the presence of skeletal deformities.  Extremities: The extremities were examined for any clubbing or cyanosis.  Skin: The skin was examined for inflammatory or neoplastic conditions.  Neurologic: The patient's orientation, mood, and affect were noted. The cranial nerve  functions were examined.  Other pertinent positive and negative findings on physical examination:   OC/OP: no lesions, uvula midline, soft palate elevates symmetrically   Neck: no lesions, no TH tenderness to palpation  All other physical examination findings were within normal limits and noncontributory.    Procedures   Flexible laryngoscopy (CPT 59920)        Pre-procedure diagnosis: dysphonia  Post-procedure diagnosis: same as above  Indication for procedure: Mr. Chin is a 62 year old male with see above  Procedure(s): Fiberoptic Laryngoscopy     Details of Procedure: After informed consent was obtained, the patient was seated in the examination chair.  The areas of the nasopharynx as well as the hypopharynx were anesthetized with topical 4% lidocaine with 0.25% phenylephrine atomizer.  Examination of the base of tongue was performed first.  Attention was directed to any evidence of masses in the area or evidence of leukoplakia or candidal infection.  Attention was directed to the epiglottis where its size and position was determined and its movement on phonation of the vowel  e .  The piriform sinuses were then inspected for any mass lesions or pooling of secretions.  Attention was then directed to the larynx. The vocal folds were inspected for infection or any areas of leukoplakia, for masses, polypoid degeneration, or hemorrhage.  Having done this, the arytenoids and vocal processes were inspected for erythema or evidence of granuloma formation.  The posterior commissure was then  inspected for evidence of inflammatory changes in the mucosa and heaping up of mucosal tissue. The patient was then instructed to say the vowel  e .  Adduction of vocal folds to the midline was observed for any evidence of paresis or paralysis of the larynx or asymmetry in rotation of the larynx to the left or right. The patient was asked to breathe and the degree of abduction was noted bilaterally.  Subglottic view of the larynx was obtained for any additional mass lesions or mucosal changes.  Finally the post cricoid was examined for evidence of pooling of secretions, as well as the pharyngeal wall mucosa.   Anesthesia type: 0.25% phenylephrine     Findings:  Anatomic/physiological deviations: LNC, persistent right vocal fold paralysis with large glottic gap                Right vocal process: Marked restriction of mobility   Left vocal process: No restriction of mobility  Glottal gap: Glottal gap  Supraglottic structures: Normal  Hypopharynx: Normal      Estimated Blood Loss: minimal  Complications: None  Disposition: Patient tolerated the procedure well           Review of Relevant Clinical Data   I personally reviewed:  Labs:  No results found for: TSH  Lab Results   Component Value Date     01/06/2023    CO2 19 (L) 01/06/2023    BUN 17.0 01/06/2023    PHOS 2.6 01/06/2023     Lab Results   Component Value Date    WBC 6.1 01/06/2023    HGB 13.5 01/06/2023    HCT 41.5 01/06/2023    MCV 87 01/06/2023     (L) 01/06/2023     No results found for: PT, PTT, INR  No results found for: MATILDE  No components found for: RHEUMATOIDFACTOR,  RF  No results found for: CRP  No components found for: CKTOT, URICACID  No components found for: C3, C4, DSDNAAB, NDNAABIFA  No results found for: MPOAB    Patient reported Quality of Life (QOL) Measures   Patient Supplied Answers To VHI Questionnaire  No flowsheet data found.      Patient Supplied Answers To EAT Questionnaire  No flowsheet data found.      Patient Supplied  Answers To CSI Questionnaire  No flowsheet data found.      Patient Supplied Answers to Dyspnea Index Questionnaire:  No flowsheet data found.    Impression & Plan     IMPRESSION: Mr. Chin is a 62 year old male who is being seen for the followin. Dysphonia  - in the setting of right cerebellomedullary angle meningioma resection 23  - voice quality has improved a bit since, but not back to baseline  - moderate voice demand  - voice worsens throughout day with use  - he feels more fatigued as speaking takes more effort  - scope shows right vocal fold paralysis, glottic gap  - discussed etiology of vocal fold paralysis in this case is due to surgery  - discussed prognosis of vocal fold paralysis is 1 year    - discussed options of treatment which include observation, voice therapy, temporary augmentation vs long term options  - has speech therapy appointments with CLARA Devries in [place]  - 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, though he reports voice has improved somewhat over the next few weeks  - would recommend observing and if continues to have pain or lack of further improvement - would recommend temporary filler   - also would recommend voice therapy with a voice specific therapist - like Marcella Verde   - symptoms 3/28/2023 are stable and no pain with talking, able to use voice at work, has been through a lot and would like to proceed with observation, was never called to have follow up with voice therapy  - scope shows persistent right vocal fold paralysis with large glottic gap   - discussed utility of EMG  Plan  - continue observation  - will schedule voice therapy sessions with Marcella     2. Dysphagia  - swallow is fine, but requires caution  - he has difficulty with solids more so, especially on the left  - Xray Video Swallow Exam 23 with mild pharyngeal weakness  - FEES shows no penetration, no aspiration, mild vallecular residue  with solids  - symptoms likely due to pharyngeal weakness  - this is improved   - symptoms 3/28/23 are no swallowing problems, no further choking  Plan  - swallow precautions    RETURN VISIT: in the summer (July or August)    Mirna Nathan MD    Laryngology    [facility]  Department of  Otolaryngology - Head and Neck Surgery  Clinics & Surgery Center  [address] [place] [address]  Appointment line: [phone]  Fax: [phone]  [email or web address]     IKlarissa, am serving as a scribe to document services personally performed by Mirna Nathan MD at this visit, based upon the provider's statements to me. All documentation has been reviewed by the aforementioned provider prior to being entered into the official medical record.

## 2023-04-19 ENCOUNTER — ANCILLARY PROCEDURE (OUTPATIENT)
Dept: MRI IMAGING | Facility: CLINIC | Age: 63
End: 2023-04-19
Attending: PHYSICIAN ASSISTANT
Payer: COMMERCIAL

## 2023-04-19 ENCOUNTER — OFFICE VISIT (OUTPATIENT)
Dept: NEUROSURGERY | Facility: CLINIC | Age: 63
End: 2023-04-19
Payer: COMMERCIAL

## 2023-04-19 VITALS — DIASTOLIC BLOOD PRESSURE: 89 MMHG | HEART RATE: 74 BPM | OXYGEN SATURATION: 96 % | SYSTOLIC BLOOD PRESSURE: 140 MMHG

## 2023-04-19 DIAGNOSIS — D32.9 MENINGIOMA (H): Primary | ICD-10-CM

## 2023-04-19 DIAGNOSIS — D32.9 MENINGIOMA (H): ICD-10-CM

## 2023-04-19 PROCEDURE — 70553 MRI BRAIN STEM W/O & W/DYE: CPT | Mod: GC | Performed by: RADIOLOGY

## 2023-04-19 PROCEDURE — 99213 OFFICE O/P EST LOW 20 MIN: CPT | Performed by: PHYSICIAN ASSISTANT

## 2023-04-19 PROCEDURE — A9585 GADOBUTROL INJECTION: HCPCS | Performed by: RADIOLOGY

## 2023-04-19 RX ORDER — GADOBUTROL 604.72 MG/ML
15 INJECTION INTRAVENOUS ONCE
Status: COMPLETED | OUTPATIENT
Start: 2023-04-19 | End: 2023-04-19

## 2023-04-19 RX ADMIN — GADOBUTROL 11 ML: 604.72 INJECTION INTRAVENOUS at 08:38

## 2023-04-19 ASSESSMENT — PAIN SCALES - GENERAL: PAINLEVEL: MILD PAIN (2)

## 2023-04-19 NOTE — NURSING NOTE
Chief Complaint   Patient presents with     Surgical Followup     3 month follow-up     Cachorro Ricardo, EMT

## 2023-04-19 NOTE — PROGRESS NOTES
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 He underwent the above mentioned procedure with Dr. Mccray earlier this year after presenting with tumor growth on serial imaging. He had a postoperative right vocal cord palsy and has been working with Dr. Mirna Nathan in ENT. He also had right sided hearing loss and tinnitus postoperatively and has improvement of both. He had an audiogram done 2023 with [facility] which demonstrated his preoperative bilateral sensorineural hearing loss; hearing aide evaluation was offered but he has decided not to pursue this at this time. He reports some subtle changes in bowel and sexual function since surgery. Otherwise denies new headaches, paresthesias, weakness, abdominal pain, dysphagia, or syncope.         Review of Systems:   Pertinent items are noted in HPI or as in patient entered ROS below, remainder of complete ROS is negative.     Physical Exam:   BP (!) 140/89 (BP Location: Right arm, Patient Position: Chair, Cuff Size: Adult Large)   Pulse 74   SpO2 96%   General: No acute distress.    Eyes: Conjunctivae are normal.  MSK: Moves all extremities.  No obvious deformity.  Neuro: The patient is fully oriented. Speech is normal. Facial nerve function is normal, rated as a House Brackmann 1. Gait is normal.  Psych: Normal mood and affect. Behavior is normal.      Imaging:  We reviewed the MRI from today which shows postoperative changes with likely small residual along the inferior  aspect of the right CPA. No acute abnormality.     IMPRESSION:     1. Surgical changes related to right retrosigmoid craniotomy and right  CPA mass resection. Small foci of residual enhancement along the  inferior aspect of the right CPA coursing further inferior towards the  jugular foramen. This may represent small volume residual tumor in  this location versus postoperative change. Attention on follow-up.  2. Periventricular white matter changes suggestive of chronic small  vessel ischemic disease similar to prior.     I have personally reviewed the examination and initial interpretation  and I agree with the findings.     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 Will plan for follow up in 1 year with repeat MRI prior to appointment, and he was encouraged to reach out sooner with new questions or concerns otherwise.        Poonam Kruger PA-C  Department of Neurosurgery

## 2023-04-19 NOTE — DISCHARGE INSTRUCTIONS
MRI Contrast Discharge Instructions    The IV contrast you received today will pass out of your body in your  urine. This will happen in the next 24 hours. You will not feel this process.  Your urine will not change color.    Drink at least 4 extra glasses of water or juice today (unless your doctor  has restricted your fluids). This reduces the stress on your kidneys.  You may take your regular medicines.    If you are on dialysis: It is best to have dialysis today.    If you have a reaction: Most reactions happen right away. If you have  any new symptoms after leaving the hospital (such as hives or swelling),  call your hospital at the correct number below. Or call your family doctor.  If you have breathing distress or wheezing, call 911.    Special instructions: ***    I have read and understand the above information.    Signature:______________________________________ Date:___________    Staff:__________________________________________ Date:___________     Time:__________    [facility] Departments:    ___[place]: [phone]  ___[facility]: [phone]  ___[place]: [phone] ___[facility]: [phone]  ___[facility]: [phone]  ___[facility]: [phone]  ___[phone]  ___[facility]: [phone]  ___[phone]

## 2023-08-26 ENCOUNTER — HEALTH MAINTENANCE LETTER (OUTPATIENT)
Age: 63
End: 2023-08-26

## 2024-02-26 ENCOUNTER — TELEPHONE (OUTPATIENT)
Dept: NEUROSURGERY | Facility: CLINIC | Age: 64
End: 2024-02-26
Payer: COMMERCIAL

## 2024-02-26 NOTE — TELEPHONE ENCOUNTER
[facility] Call Center    Phone Message    May a detailed message be left on voicemail: yes     Reason for Call: Other: Patient calling back and we did schedule with Dr. Mccray on 4/17/2024.  There was no order in there to schedule his MRI.  Can an order be put in to schedule?  Please call patient back.       Action Taken: Message routed to:  [facility] ([facility]): [facility]    Travel Screening: Not Applicable

## 2024-02-26 NOTE — TELEPHONE ENCOUNTER
Left Voicemail (1st Attempt) and Sent Mychart (1st Attempt) for the patient to call back and schedule the following:    Appointment type: Return Skull Base  Provider: Dr. Mccray  Return date: Around 4/19/2024  Specialty phone number: [phone]  Additional appointment(s) needed: MRI prior  Additional Notes: N/A    Jacques Richter on 2/26/2024 at 2:09 PM

## 2024-04-17 ENCOUNTER — OFFICE VISIT (OUTPATIENT)
Dept: NEUROSURGERY | Facility: CLINIC | Age: 64
End: 2024-04-17
Attending: NEUROLOGICAL SURGERY
Payer: COMMERCIAL

## 2024-04-17 ENCOUNTER — ANCILLARY PROCEDURE (OUTPATIENT)
Dept: MRI IMAGING | Facility: CLINIC | Age: 64
End: 2024-04-17
Attending: PHYSICIAN ASSISTANT
Payer: COMMERCIAL

## 2024-04-17 VITALS
HEIGHT: 77 IN | OXYGEN SATURATION: 97 % | WEIGHT: 256.8 LBS | HEART RATE: 65 BPM | SYSTOLIC BLOOD PRESSURE: 145 MMHG | BODY MASS INDEX: 30.32 KG/M2 | DIASTOLIC BLOOD PRESSURE: 94 MMHG | RESPIRATION RATE: 16 BRPM

## 2024-04-17 DIAGNOSIS — D32.9 MENINGIOMA (H): Primary | ICD-10-CM

## 2024-04-17 DIAGNOSIS — D32.9 MENINGIOMA (H): ICD-10-CM

## 2024-04-17 PROCEDURE — 99213 OFFICE O/P EST LOW 20 MIN: CPT | Performed by: NEUROLOGICAL SURGERY

## 2024-04-17 PROCEDURE — 70553 MRI BRAIN STEM W/O & W/DYE: CPT | Mod: GC | Performed by: STUDENT IN AN ORGANIZED HEALTH CARE EDUCATION/TRAINING PROGRAM

## 2024-04-17 PROCEDURE — A9585 GADOBUTROL INJECTION: HCPCS | Performed by: STUDENT IN AN ORGANIZED HEALTH CARE EDUCATION/TRAINING PROGRAM

## 2024-04-17 RX ORDER — GADOBUTROL 604.72 MG/ML
15 INJECTION INTRAVENOUS ONCE
Status: COMPLETED | OUTPATIENT
Start: 2024-04-17 | End: 2024-04-17

## 2024-04-17 RX ORDER — GADOBUTROL 604.72 MG/ML
10 INJECTION INTRAVENOUS ONCE
OUTPATIENT
Start: 2024-04-17 | End: 2024-04-17

## 2024-04-17 RX ADMIN — GADOBUTROL 11 ML: 604.72 INJECTION INTRAVENOUS at 08:28

## 2024-04-17 NOTE — PROGRESS NOTES
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 Bazzi,     It was a pleasure to see Mr. Chin back in the Center for Skull Base and Pituitary Surgery today in follow up.  As you recall, Mr. Chin is a 63 year old right-handed male with a history of BCC, hypertension, and neuroendocrine pancreatic tumor who was found with a right cerebellomedullary angle mass that grew on surveillance imaging. He underwent a retromastoid approach on 2023. Pathology revealed a grade 1 meningioma. He had a postoperative right vocal cord palsy that persisted for 3-6 months and he had a vocal cord injection with Dr. Mirna Nathan in ENT. His vagus nerve has since recovered and he has been pleased with his result so far. He had an audiogram done 2023 with [facility] which demonstrated similar preoperative bilateral sensorineural hearing loss.    Today he reports that he got his voice back in November of last year and feels grateful. Overall, he feels like his balance has improved.     Review of Systems:   Pertinent items are noted in HPI, remainder of complete ROS is negative.      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 He is from [place] originally. He has never smoked. He has  "6 children: 4 biological daughters and 2 adopted children. His daughter works for [employer]. He enjoys skiing and bikes twice per week.      Physical Exam:   BP (!) 145/94 (BP Location: Right arm, Patient Position: Sitting, Cuff Size: Adult Large)   Pulse 65   Resp 16   Ht 1.943 m (6' 4.5\")   Wt 116.5 kg (256 lb 12.8 oz)   SpO2 97%   BMI 30.85 kg/m       General: No acute distress.   Head: No signs of trauma.    Eyes: Conjunctivae are normal.  Mouth/Throat: Oropharynx moist.  Neck: Normal range of motion.    Resp: No respiratory distress.   MSK: Moves all extremities.  No obvious deformity.  Neuro: The patient is fully oriented and quite pleasant. Speech normal. Extraocular movements are intact without nystagmus. Facial sensation is intact in V1, V2, V3 distributions. Facial nerve function is normal, rated as a House Brackmann 1, without synkinesis. Palate is symmetric. Shoulders are full strength. Tongue is midline. There is no pronator drift. Full strength in all extremities. Sensation intact throughout. No dysmetria with finger-nose-finger testing. Gait has is normal-based.  Psych: Normal mood and affect. Behavior is normal.    Incision: clean and well healed    Imaging:  We reviewed his recent MRI of the Brain 04/17/24 and compared it to the prior MRIs. This demonstrates postsurgical changes and resection of the large meningioma. There is a stable tiny nodular enhancement along the medial resection margin which is favored by radiology to represent post surgical changes.    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 I would like to see him back in 1 year with an updated MRI  I encouraged him to contact us should any questions or concerns arise in advance of his next appointment      It has been a pleasure to participate in the care of your patient. Please feel " free to contact us if we may be of any assistance for Mr. Chin.    Quinten Mccray MD   Department of Neurosurgery  Center for Skull Base and Pituitary Surgery  [facility]      25 minutes spent on the date of the encounter doing chart review, review of outside records, review of test results, interpretation of tests, patient visit, documentation and discussion with other provider(s)       Scribe Disclosure:   I, Marlene Hickman, am serving as a scribe; to document services personally performed by Quinten Mccray MD -based on data collection and the provider's statements to me.

## 2024-04-17 NOTE — NURSING NOTE
Chief Complaint   Patient presents with    RECHECK     Here for a follow up, confirmed with patient     Katie Clark

## 2024-04-17 NOTE — LETTER
2024       RE: Kev Chin  [address]  [facility] [address]       Dear Colleague,    Thank you for referring your patient, Kev Chin, to the [facility] NEUROSURGERY CLINIC [place] [facility]. Please see a copy of my visit note below.    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 Bazzi,     It was a pleasure to see Mr. Chin back in the Center for Skull Base and Pituitary Surgery today in follow up.  As you recall, Mr. Chin is a 63 year old right-handed male with a history of BCC, hypertension, and neuroendocrine pancreatic tumor who was found with a right cerebellomedullary angle mass that grew on surveillance imaging. He underwent a retromastoid approach on 2023. Pathology revealed a grade 1 meningioma. He had a postoperative right vocal cord palsy that persisted for 3-6 months and he had a vocal cord injection with Dr. Mirna Nathan in ENT. His vagus nerve has since recovered and he has been pleased with his result so far. He had an [facility] with [facility] which demonstrated similar preoperative bilateral sensorineural hearing loss.    Today he reports that he got his voice back in November of last year and feels grateful. Overall, he feels like his balance has improved.     Review of Systems:   Pertinent items are noted in HPI, remainder of complete ROS is negative.      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 He is from [place] originally. He has never smoked. He has 6 children: 4 biological daughters and 2 adopted children. His daughter works for [employer]. He enjoys skiing and bikes twice per week.      Physical Exam:   BP (!) 145/94 (BP Location: Right arm, Patient Position: Sitting, Cuff Size: Adult Large)   Pulse 65   Resp 16   Ht 1.943 m (6' 4.5\")   Wt 116.5 kg (256 lb 12.8 oz)   SpO2 97%   BMI 30.85 kg/m       General: No acute distress.   Head: No signs of trauma.    Eyes: Conjunctivae are normal.  Mouth/Throat: Oropharynx moist.  Neck: Normal range of motion.    Resp: No respiratory distress.   MSK: Moves all extremities.  No obvious deformity.  Neuro: The patient is fully oriented and quite pleasant. Speech normal. Extraocular movements are intact without nystagmus. Facial sensation is intact in V1, V2, V3 distributions. Facial nerve function is normal, rated as a House Brackmann 1, without synkinesis. Palate is symmetric. Shoulders are full strength. Tongue is midline. There is no pronator drift. Full strength in all extremities. Sensation intact throughout. No dysmetria with finger-nose-finger testing. Gait has is normal-based.  Psych: Normal mood and affect. Behavior is normal.    Incision: clean and well healed    Imaging:  We reviewed his recent MRI of the Brain 04/17/24 and compared it to the prior MRIs. This demonstrates postsurgical changes and resection of the large meningioma. There is a stable tiny nodular enhancement along the medial resection margin which is favored by radiology to represent post surgical changes.    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 I would like to see him back in 1 year with an updated MRI  I encouraged him to contact us should any questions or concerns arise in advance of his next appointment      It has been a pleasure to participate in the care of your patient. Please feel free to contact us if we may be of any assistance for Mr. Chin.       25 minutes spent on the date of the encounter doing chart review, review of outside records, review of test results, interpretation of tests, patient visit, documentation and discussion with other provider(s)       Scribe Disclosure:   I, Marlene Hickman, am serving as a scribe; to document services personally performed by Quinten Mccray MD -based on data collection and the provider's statements to me.       Again, thank you for allowing me to participate in the care of your patient.      Sincerely,    Quinten Mccray MD

## 2024-04-17 NOTE — PATIENT INSTRUCTIONS
You were seen today with Dr. Quinten Mccray.    Next steps:  Return to clinic in 1 year with an MRI prior to your appointment      How to Contact Us  Send a [employer] message to your provider.   Call the clinic - your call will be routed appropriately.   Neurosurgery Clinic: [phone]  To speak directly to an RN Care Coordinator:  Gaby RN: [phone]  Kallie RN: [phone]    Note: We do our best to check voicemail frequently throughout the day and make every effort to return calls within 1-2 business days. For urgent matters, please use the general clinic phone numbers listed above.

## 2024-04-17 NOTE — LETTER
[place] FOR SKULL BASE AND PITUITARY SURGERY  [facility] NEUROSURGERY CLINIC [address]  3RD FLO[address]  [facility] [address]  Phone: [phone]  Fax: [phone]          2024    RE:   Kev Chin  [address]  [facility] [address]      Dear Colleague,    Thank you for referring your patient, Kev Chin, to the Center for Skull Base and Pituitary Surgery. Please see a copy of my visit note below.      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 Bazzi,     It was a pleasure to see Mr. Chin back in the Center for Skull Base and Pituitary Surgery today in follow up.  As you recall, Mr. Chin is a 63 year old right-handed male with a history of BCC, hypertension, and neuroendocrine pancreatic tumor who was found with a right cerebellomedullary angle mass that grew on surveillance imaging. He underwent a retromastoid approach on 2023. Pathology revealed a grade 1 meningioma. He had a postoperative right vocal cord palsy that persisted for 3-6 months and he had a vocal cord injection with Dr. Mirna Nathan in ENT. His vagus nerve has since recovered and he has been pleased with his result so far. He had [facility] which demonstrated similar preoperative bilateral sensorineural hearing loss.    Today he reports that he got his voice back in November of last year and feels grateful. Overall, he feels like his balance has improved.     Review of Systems:   Pertinent items are noted in HPI, remainder of complete ROS is negative.      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 He has never smoked. He has 6 children: 4 biological daughters and 2 adopted children. His daughter works for [employer]. He enjoys skiing and bikes twice per week.      Physical Exam:   BP (!) 145/94 (BP Location: Right arm, Patient Position: Sitting, Cuff Size: Adult Large)   Pulse 65   Resp 16   Ht 1.943 m (6' 4.5\")   Wt 116.5 kg (256 lb 12.8 oz)   SpO2 97%   BMI 30.85 kg/m       General: No acute distress.   Head: No signs of trauma.    Eyes: Conjunctivae are normal.  Mouth/Throat: Oropharynx moist.  Neck: Normal range of motion.    Resp: No respiratory distress.   MSK: Moves all extremities.  No obvious deformity.  Neuro: The patient is fully oriented and quite pleasant. Speech normal. Extraocular movements are intact without nystagmus. Facial sensation is intact in V1, V2, V3 distributions. Facial nerve function is normal, rated as a House Brackmann 1, without synkinesis. Palate is symmetric. Shoulders are full strength. Tongue is midline. There is no pronator drift. Full strength in all extremities. Sensation intact throughout. No dysmetria with finger-nose-finger testing. Gait has is normal-based.  Psych: Normal mood and affect. Behavior is normal.    Incision: clean and well healed    Imaging:  We reviewed his recent MRI of the Brain 04/17/24 and compared it to the prior MRIs. This demonstrates postsurgical changes and resection of the large meningioma. There is a stable tiny nodular enhancement along the medial resection margin which is favored by radiology to represent " post surgical changes.    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 I would like to see him back in 1 year with an updated MRI  I encouraged him to contact us should any questions or concerns arise in advance of his next appointment      It has been a pleasure to participate in the care of your patient. Please feel free to contact us if we may be of any assistance for Mr. Chin.    Quinten Mccray MD   Department of Neurosurgery  Center for Skull Base and Pituitary Surgery  [facility]      25 minutes spent on the date of the encounter doing chart review, review of outside records, review of test results, interpretation of tests, patient visit, documentation and discussion with other provider(s)       Scribe Disclosure:   I, Marlene Hickman, am serving as a scribe; to document services personally performed by Quinten Mccray MD -based on data collection and the provider's statements to me.       Again, thank you for allowing me to participate in the care of your patient.      Sincerely,    Quinten Mccray MD

## 2024-10-19 ENCOUNTER — HEALTH MAINTENANCE LETTER (OUTPATIENT)
Age: 64
End: 2024-10-19

## 2024-12-11 NOTE — PROGRESS NOTES
RN Care Coordination Note  Received voicemail from patient questioning when he should have follow-up visit. RNCC reviewed with Dr Barr. Patient was first seen in late July 2021 as 2nd opinion. Discussed at tumor conference regarding his resectability. It was unclear if patient could be surgical candidate at that time. Dr Barr recommends patient meet with surg onc team to further discuss. Recommends new CT prior to consult.     Placed return call to patient and reviewed above. States he is interested in meeting with surgical team. He is not sure he would like to undergo whipple but would like to discuss if there are other options. Reports he is currently is schedule for CT at [facility] on 12/16. He would like to wait until after this scan to have consult.     Answered all questions to his stated satisfaction.        Mckenna Newell RN, BSN, OCN   RN Care Coordinator   [facility]

## 2025-01-23 ENCOUNTER — TELEPHONE (OUTPATIENT)
Dept: NEUROSURGERY | Facility: CLINIC | Age: 65
End: 2025-01-23
Payer: COMMERCIAL

## 2025-01-23 NOTE — TELEPHONE ENCOUNTER
"Left Voicemail (1st Attempt) and Sent Mychart (1st Attempt) for the patient to call back and schedule the following:    Appointment type: Return skull base  Provider: Dr Mccray  Return date: Around April 17th \"2025\"  Specialty phone number: [phone]  Additional appointment(s) needed: MRI prior  Additonal Notes: In person or virtual   "

## 2025-02-05 ENCOUNTER — TELEPHONE (OUTPATIENT)
Dept: NEUROSURGERY | Facility: CLINIC | Age: 65
End: 2025-02-05
Payer: COMMERCIAL

## 2025-02-05 NOTE — TELEPHONE ENCOUNTER
Spoke to pt and sched a follow up appt with Dr Mccray on April 30th. Sent message to nurse pool to extend MRI order.

## 2025-04-23 NOTE — PROGRESS NOTES
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 Bazzi,     It was a pleasure to see Mr. Chin back in the Center for Skull Base and Pituitary Surgery today in follow up.  As you recall, Mr. Chin is a 64 year old right-handed male with a history of BCC, hypertension, and neuroendocrine pancreatic tumor who was found with a right cerebellomedullary angle mass that grew on surveillance imaging. He underwent a retromastoid approach on 2023. Pathology revealed a grade 1 meningioma. He had a postoperative right vocal cord palsy that persisted for 3-6 months and he had a vocal cord injection with Dr. Mirna Nathan in ENT. His vagus nerve has since recovered and he has been pleased with his result so far. He had an audiogram done 2023 with [facility] which demonstrated similar preoperative bilateral sensorineural hearing loss.  He returns today for scheduled followup.   He has been doing well.    Review of Systems:   Pertinent items are noted in HPI, remainder of complete ROS is negative.      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 He has never smoked. He has 6 children: 4 biological daughters and 2 adopted children. His daughter  works for [employer]. He enjoys skiing and bikes twice per week.      Physical Exam:   General: No acute distress.   Head: No signs of trauma.    Eyes: Conjunctivae are normal.  Mouth/Throat: Oropharynx moist.  Neck: Normal range of motion.    Resp: No respiratory distress.   MSK: Moves all extremities.  No obvious deformity.  Neuro: The patient is fully oriented and quite pleasant. Speech normal. Extraocular movements are intact without nystagmus. Facial sensation is intact in V1, V2, V3 distributions. Facial nerve function is normal, rated as a House Brackmann 1, without synkinesis. Palate is symmetric. Shoulders are full strength. Tongue is midline. There is no pronator drift. Full strength in all extremities. Sensation intact throughout. No dysmetria with finger-nose-finger testing. Gait has is normal-based.  Psych: Normal mood and affect. Behavior is normal.    Incision: clean and well healed    Imaging:  We reviewed his recent MRI of the Brain 4/30/2025 and compared it to the prior MRIs. This demonstrates postsurgical changes and resection of the large meningioma. There is a stable 5-6mm tiny nodular enhancement along the medial resection margin.    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 I would like to see him back in 2 years with an updated MRI  I encouraged him to contact us should any questions or concerns arise in advance of his next appointment      It has been a pleasure to participate in the care of your patient. Please feel free to contact us if we may be of any assistance for Mr. Chin.    Quinten Mccray MD   Department of Neurosurgery  Center for Skull Base and Pituitary Surgery  [facility]      25 minutes spent on the date of the encounter doing chart review, review of outside records, review of test results, interpretation of  tests, patient visit, documentation and discussion with other provider(s)

## 2025-04-30 ENCOUNTER — ANCILLARY PROCEDURE (OUTPATIENT)
Dept: MRI IMAGING | Facility: CLINIC | Age: 65
End: 2025-04-30
Attending: NEUROLOGICAL SURGERY
Payer: COMMERCIAL

## 2025-04-30 ENCOUNTER — OFFICE VISIT (OUTPATIENT)
Dept: NEUROSURGERY | Facility: CLINIC | Age: 65
End: 2025-04-30
Attending: NEUROLOGICAL SURGERY
Payer: COMMERCIAL

## 2025-04-30 VITALS — DIASTOLIC BLOOD PRESSURE: 84 MMHG | HEART RATE: 73 BPM | OXYGEN SATURATION: 97 % | SYSTOLIC BLOOD PRESSURE: 152 MMHG

## 2025-04-30 DIAGNOSIS — D32.9 MENINGIOMA (H): Primary | ICD-10-CM

## 2025-04-30 DIAGNOSIS — D32.9 MENINGIOMA (H): ICD-10-CM

## 2025-04-30 PROCEDURE — 70553 MRI BRAIN STEM W/O & W/DYE: CPT | Performed by: RADIOLOGY

## 2025-04-30 PROCEDURE — A9585 GADOBUTROL INJECTION: HCPCS | Mod: JW | Performed by: RADIOLOGY

## 2025-04-30 RX ORDER — GADOBUTROL 604.72 MG/ML
15 INJECTION INTRAVENOUS ONCE
Status: COMPLETED | OUTPATIENT
Start: 2025-04-30 | End: 2025-04-30

## 2025-04-30 RX ADMIN — GADOBUTROL 11 ML: 604.72 INJECTION INTRAVENOUS at 07:58

## 2025-04-30 NOTE — LETTER
[place] FOR SKULL BASE AND PITUITARY SURGERY  [facility] NEUROSURGERY CLINIC [address]  3RD FLOOR  [facility] [address]  Phone: [phone]  Fax: [phone]          2025    RE:   Kev Chin  [address]  [facility] [address]      Dear Colleague,    Thank you for referring your patient, Kev Chin, to the Center for Skull Base and Pituitary Surgery. Please see a copy of my visit note below.      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 Bazzi,     It was a pleasure to see Mr. Chin back in the Center for Skull Base and Pituitary Surgery today in follow up.  As you recall, Mr. Chin is a 64 year old right-handed male with a history of BCC, hypertension, and neuroendocrine pancreatic tumor who was found with a right cerebellomedullary angle mass that grew on surveillance imaging. He underwent a retromastoid approach on 2023. Pathology revealed a grade 1 meningioma. He had a postoperative right vocal cord palsy that persisted for 3-6 months and he had a vocal cord injection with Dr. Mirna Nathan in ENT. His vagus nerve has since recovered and he has been pleased with his result so far. [facility] bilateral sensorineural hearing loss.  He returns today for scheduled followup.   He has been doing well.    Review of Systems:   Pertinent items are noted in HPI, remainder of complete ROS is negative.      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 He has never smoked. He has 6 children: 4 biological daughters and 2 adopted children. His daughter works for [employer]. He enjoys skiing and bikes twice per week.      Physical Exam:   General: No acute distress.   Head: No signs of trauma.    Eyes: Conjunctivae are normal.  Mouth/Throat: Oropharynx moist.  Neck: Normal range of motion.    Resp: No respiratory distress.   MSK: Moves all extremities.  No obvious deformity.  Neuro: The patient is fully oriented and quite pleasant. Speech normal. Extraocular movements are intact without nystagmus. Facial sensation is intact in V1, V2, V3 distributions. Facial nerve function is normal, rated as a House Brackmann 1, without synkinesis. Palate is symmetric. Shoulders are full strength. Tongue is midline. There is no pronator drift. Full strength in all extremities. Sensation intact throughout. No dysmetria with finger-nose-finger testing. Gait has is normal-based.  Psych: Normal mood and affect. Behavior is normal.    Incision: clean and well healed    Imaging:  We reviewed his recent MRI of the Brain 4/30/2025 and compared it to the prior MRIs. This demonstrates postsurgical changes and resection of the large meningioma. There is a stable 5-6mm tiny nodular enhancement along the medial resection margin.    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 I would like to see him back in 2 years with an updated MRI  I encouraged him to contact us should any questions or concerns  arise in advance of his next appointment      It has been a pleasure to participate in the care of your patient. Please feel free to contact us if we may be of any assistance for Mr. Chin.    Quinten Mccray MD   Department of Neurosurgery  Center for Skull Base and Pituitary Surgery  [facility]      25 minutes spent on the date of the encounter doing chart review, review of outside records, review of test results, interpretation of tests, patient visit, documentation and discussion with other provider(s)           Again, thank you for allowing me to participate in the care of your patient.      Sincerely,    Quinten Mccray MD

## 2025-04-30 NOTE — PATIENT INSTRUCTIONS
You were seen today with Dr. Quinten Mccray.    Next steps:  Return to clinic in 2 years to see Dr. Mccray  Please complete MRI prior to your clinic visit      How to Contact Us  Send a [employer] message to your provider.   Call the clinic - your call will be routed appropriately.   Neurosurgery Clinic: [phone]  To speak directly to an RN Care Coordinator:  Gaby RN: [phone]  Kallie RN: [phone]    Note: We do our best to check voicemail frequently throughout the day and make every effort to return calls within 1-2 business days. For urgent matters, please use the general clinic phone numbers listed above.

## 2025-04-30 NOTE — NURSING NOTE
Chief Complaint   Patient presents with    RECHECK     Return skull base neurosurg     Steven Miller

## (undated) DEVICE — COVER CAMERA VIDEO LASER 9X96" 04-CC227

## (undated) DEVICE — PACK CRANIOTOMY

## (undated) DEVICE — SPONGE COTTONOID 1/2X3" 20-07S

## (undated) DEVICE — SPONGE COTTONOID 1X3" 20-10S

## (undated) DEVICE — TUBING SMOKE EVAC 3/8"X10' 0702-045-023

## (undated) DEVICE — CUSA TUBE QUICK CONNECT CLARITY C7300

## (undated) DEVICE — ESU GROUND PAD ADULT W/CORD E7507

## (undated) DEVICE — ESU FCP CODMAN 9"X1.0MM VERSATRU 9009100SL

## (undated) DEVICE — TUBING SUCTION MEDI-VAC SOFT 3/16"X20' N520A

## (undated) DEVICE — Device

## (undated) DEVICE — CUSA 36KHZ WRENCH TORQUE CLARITY C7602

## (undated) DEVICE — PAD CHUX UNDERPAD 23X24" 7136

## (undated) DEVICE — ESU CORD BIPOLAR GREEN 10-4000

## (undated) DEVICE — SU ETHILON 3-0 PS-1 18" 1663H

## (undated) DEVICE — SPECIMEN BAG MEDIVAC SUCTION WHITE SOCK 65652-122

## (undated) DEVICE — NDL ANGIOCATH 14GA 1.25" 4048

## (undated) DEVICE — DRSG PRIMAPORE 03 1/8X6" 66000318

## (undated) DEVICE — PREP CHLORAPREP CLEAR 3ML 930400

## (undated) DEVICE — SPONGE COTTONOID 1/2X1/2" 20-04S

## (undated) DEVICE — NDL COUNTER 20CT 31142493

## (undated) DEVICE — SU VICRYL 0 CT-1 CR 8X18" J740D

## (undated) DEVICE — NDL 25GA 2"  8881200441

## (undated) DEVICE — DRAPE CRANIOTOMY W/POUCH 9450

## (undated) DEVICE — SOL WATER IRRIG 1000ML BOTTLE 2F7114

## (undated) DEVICE — ADH LIQUID MASTISOL TOPICAL VIAL 2-3ML 0523-48

## (undated) DEVICE — PREP DURAPREP 26ML APL 8630

## (undated) DEVICE — ESU CORD BIPOLAR AND IRR TUBING AESCULAP US355

## (undated) DEVICE — GLOVE PROTEXIS BLUE W/NEU-THERA 7.5  2D73EB75

## (undated) DEVICE — DRAPE MAYO STAND 23X54 8337

## (undated) DEVICE — BLADE CLIPPER SGL USE 9680

## (undated) DEVICE — DRAPE MICROSCOPE LEICA 54X120" 09-MK653

## (undated) DEVICE — LINEN TOWEL PACK X6 WHITE 5487

## (undated) DEVICE — IOM SUPPLIES/CASE FEE

## (undated) DEVICE — BONE WAX 2.5GM W31G

## (undated) DEVICE — GLOVE PROTEXIS MICRO 7.5  2D73PM75

## (undated) DEVICE — DRAPE POUCH INSTRUMENT 1018

## (undated) DEVICE — STPL SKIN 35W ROTATING HEAD PRW35

## (undated) DEVICE — RETR ELASTIC STAYS LONE STAR BLUNT DUAL LEAD 3550-1G

## (undated) DEVICE — PREP POVIDONE IODINE SOLUTION 10% 4OZ BOTTLE 29906-004

## (undated) DEVICE — SPONGE SURGIFOAM 01GM POWDER 1978

## (undated) DEVICE — LINEN TOWEL PACK X30 5481

## (undated) DEVICE — CUSA 36KHZ TIP CVD EXT MICROTIP CLARITY C74115

## (undated) DEVICE — PROTECTOR ARM ONE-STEP TRENDELENBURG 40418

## (undated) DEVICE — DRAPE SLEEVE 599

## (undated) DEVICE — SU NUROLON 4-0 TF CR 8X18" C584D

## (undated) DEVICE — PENCIL PRESHARPENED SOFT #2 1/PK  17701/50

## (undated) DEVICE — SUCTION MANIFOLD NEPTUNE 2 SYS 4 PORT 0702-020-000

## (undated) DEVICE — SPONGE SURGIFOAM 100 1974

## (undated) DEVICE — WIPES FOLEY CARE SURESTEP PROVON DFC100

## (undated) DEVICE — MARKER SPHERES PASSIVE MEDT PACK 5 8801075

## (undated) DEVICE — BLADE KNIFE BEAVER MICROSHARP GREEN 377515

## (undated) DEVICE — SYR EAR BULB 3OZ 0035830

## (undated) DEVICE — BURR ELITE PRECISION ROUND 5MM 5820009050

## (undated) DEVICE — SYR 10ML FINGER CONTROL W/O NDL 309695

## (undated) DEVICE — SU MONOCRYL 4-0 PS-2 27" UND Y426H

## (undated) DEVICE — SPONGE COTTONOID 1/2X1 1/2" 20-06S

## (undated) DEVICE — BUR STRK 2.3MM TAPERED ROUTER - FA2 5407-FA2-023

## (undated) DEVICE — DRSG KERLIX 4 1/2"X4YDS ROLL 6715

## (undated) DEVICE — BUR STRK CARBIDE MATCH HEAD 3.0MM 5820-107-530C

## (undated) DEVICE — SURGICEL HEMOSTAT 4X8" 1952

## (undated) DEVICE — SYRINGE MNJCT 12ML LF STRL LL TIP MED PP DISP

## (undated) DEVICE — SPONGE COTTONOID 1/4X1/4" 20-01S

## (undated) DEVICE — DRAPE SHEET REV FOLD 3/4 9349

## (undated) DEVICE — DRSG STERI STRIP 1/2X4" R1547

## (undated) DEVICE — SOL RINGERS LACTATED 1000ML BAG 2B2324X

## (undated) DEVICE — SU VICRYL 2-0 CT-2 CR 8X18" J726D

## (undated) DEVICE — CATH TRAY FOLEY SURESTEP 16FR W/TMP PRB STLK LATEX A319416AM

## (undated) DEVICE — ESU HOLSTER PLASTIC DISP E2400

## (undated) DEVICE — SUTURE VICRYL+ 3-0 18 SH/CR UND VCP864

## (undated) DEVICE — PREP CHLORAPREP 26ML TINTED HI-LITE ORANGE 930815

## (undated) DEVICE — PREP POVIDONE-IODINE 7.5% SCRUB 4OZ BOTTLE MDS093945

## (undated) DEVICE — PIN SKULL MAYFIELD ADULT TITANIUM 3/PK A1120

## (undated) DEVICE — DRAPE EYE SHEET 8441

## (undated) DEVICE — DRAPE CORETEMP FLUID WARM SYSTEM 62X56IN CTD200

## (undated) DEVICE — RX BACITRACIN OINTMENT 0.9G 1/32OZ CUR001109

## (undated) DEVICE — SYR 30ML LL W/O NDL 302832

## (undated) RX ORDER — PROPOFOL 10 MG/ML
INJECTION, EMULSION INTRAVENOUS
Status: DISPENSED
Start: 2023-01-02

## (undated) RX ORDER — FENTANYL CITRATE 50 UG/ML
INJECTION, SOLUTION INTRAMUSCULAR; INTRAVENOUS
Status: DISPENSED
Start: 2023-01-02

## (undated) RX ORDER — ONDANSETRON 2 MG/ML
INJECTION INTRAMUSCULAR; INTRAVENOUS
Status: DISPENSED
Start: 2023-01-02

## (undated) RX ORDER — SODIUM CHLORIDE 9 MG/ML
INJECTION, SOLUTION INTRAVENOUS
Status: DISPENSED
Start: 2023-01-02

## (undated) RX ORDER — CEFAZOLIN SODIUM 1 G/3ML
INJECTION, POWDER, FOR SOLUTION INTRAMUSCULAR; INTRAVENOUS
Status: DISPENSED
Start: 2023-01-02

## (undated) RX ORDER — FENTANYL CITRATE-0.9 % NACL/PF 10 MCG/ML
PLASTIC BAG, INJECTION (ML) INTRAVENOUS
Status: DISPENSED
Start: 2023-01-02

## (undated) RX ORDER — MANNITOL 20 G/100ML
INJECTION, SOLUTION INTRAVENOUS
Status: DISPENSED
Start: 2023-01-02

## (undated) RX ORDER — CEFAZOLIN SODIUM/WATER 2 G/20 ML
SYRINGE (ML) INTRAVENOUS
Status: DISPENSED
Start: 2023-01-02

## (undated) RX ORDER — DEXAMETHASONE SODIUM PHOSPHATE 4 MG/ML
INJECTION, SOLUTION INTRA-ARTICULAR; INTRALESIONAL; INTRAMUSCULAR; INTRAVENOUS; SOFT TISSUE
Status: DISPENSED
Start: 2023-01-02

## (undated) RX ORDER — EPHEDRINE SULFATE 50 MG/ML
INJECTION, SOLUTION INTRAMUSCULAR; INTRAVENOUS; SUBCUTANEOUS
Status: DISPENSED
Start: 2023-01-02

## (undated) RX ORDER — GLYCOPYRROLATE 0.2 MG/ML
INJECTION, SOLUTION INTRAMUSCULAR; INTRAVENOUS
Status: DISPENSED
Start: 2023-01-02

## (undated) RX ORDER — HYDRALAZINE HYDROCHLORIDE 20 MG/ML
INJECTION INTRAMUSCULAR; INTRAVENOUS
Status: DISPENSED
Start: 2023-01-02

## (undated) RX ORDER — LIDOCAINE HYDROCHLORIDE 20 MG/ML
SOLUTION OROPHARYNGEAL
Status: DISPENSED
Start: 2023-01-24

## (undated) RX ORDER — LABETALOL HYDROCHLORIDE 5 MG/ML
INJECTION, SOLUTION INTRAVENOUS
Status: DISPENSED
Start: 2023-01-02

## (undated) RX ORDER — HYDROMORPHONE HYDROCHLORIDE 1 MG/ML
INJECTION, SOLUTION INTRAMUSCULAR; INTRAVENOUS; SUBCUTANEOUS
Status: DISPENSED
Start: 2023-01-02